# Patient Record
Sex: FEMALE | Race: ASIAN | NOT HISPANIC OR LATINO | Employment: PART TIME | URBAN - METROPOLITAN AREA
[De-identification: names, ages, dates, MRNs, and addresses within clinical notes are randomized per-mention and may not be internally consistent; named-entity substitution may affect disease eponyms.]

---

## 2017-02-02 ENCOUNTER — HOSPITAL ENCOUNTER (OUTPATIENT)
Dept: ULTRASOUND IMAGING | Facility: CLINIC | Age: 22
Discharge: HOME | End: 2017-02-02
Attending: NURSE PRACTITIONER

## 2017-02-02 ENCOUNTER — COMMUNICATION - HEALTHEAST (OUTPATIENT)
Dept: FAMILY MEDICINE | Facility: CLINIC | Age: 22
End: 2017-02-02

## 2017-02-02 ENCOUNTER — OFFICE VISIT - HEALTHEAST (OUTPATIENT)
Dept: FAMILY MEDICINE | Facility: CLINIC | Age: 22
End: 2017-02-02

## 2017-02-02 DIAGNOSIS — N39.0 UTI (URINARY TRACT INFECTION): ICD-10-CM

## 2017-02-02 DIAGNOSIS — Z34.90 PREGNANCY: ICD-10-CM

## 2017-02-02 DIAGNOSIS — Z33.1 PREGNANT STATE, INCIDENTAL: ICD-10-CM

## 2017-02-02 DIAGNOSIS — R10.812 LEFT UPPER QUADRANT ABDOMINAL TENDERNESS, REBOUND TENDERNESS PRESENCE NOT SPECIFIED: ICD-10-CM

## 2017-02-02 ASSESSMENT — MIFFLIN-ST. JEOR: SCORE: 1179.5

## 2017-02-13 ENCOUNTER — COMMUNICATION - HEALTHEAST (OUTPATIENT)
Dept: FAMILY MEDICINE | Facility: CLINIC | Age: 22
End: 2017-02-13

## 2017-03-01 ENCOUNTER — PRENATAL OFFICE VISIT - HEALTHEAST (OUTPATIENT)
Dept: FAMILY MEDICINE | Facility: CLINIC | Age: 22
End: 2017-03-01

## 2017-03-01 DIAGNOSIS — O09.891 SUPERVISION OF OTHER HIGH RISK PREGNANCIES, FIRST TRIMESTER: ICD-10-CM

## 2017-03-01 DIAGNOSIS — O34.219 PREVIOUS CESAREAN DELIVERY, ANTEPARTUM CONDITION OR COMPLICATION: ICD-10-CM

## 2017-03-01 LAB — HIV 1+2 AB+HIV1 P24 AG SERPL QL IA: NEGATIVE

## 2017-03-01 ASSESSMENT — MIFFLIN-ST. JEOR: SCORE: 1189.36

## 2017-03-02 LAB
HBV SURFACE AG SERPL QL IA: NEGATIVE
SYPHILIS RPR SCREEN - HISTORICAL: NORMAL

## 2017-03-09 ENCOUNTER — COMMUNICATION - HEALTHEAST (OUTPATIENT)
Dept: FAMILY MEDICINE | Facility: CLINIC | Age: 22
End: 2017-03-09

## 2017-03-29 ENCOUNTER — PRENATAL OFFICE VISIT - HEALTHEAST (OUTPATIENT)
Dept: FAMILY MEDICINE | Facility: CLINIC | Age: 22
End: 2017-03-29

## 2017-03-29 DIAGNOSIS — O09.891 SUPERVISION OF OTHER HIGH RISK PREGNANCIES, FIRST TRIMESTER: ICD-10-CM

## 2017-03-29 DIAGNOSIS — O34.219 PREVIOUS CESAREAN DELIVERY, ANTEPARTUM CONDITION OR COMPLICATION: ICD-10-CM

## 2017-03-29 ASSESSMENT — MIFFLIN-ST. JEOR: SCORE: 1202.51

## 2017-04-28 ENCOUNTER — PRENATAL OFFICE VISIT - HEALTHEAST (OUTPATIENT)
Dept: FAMILY MEDICINE | Facility: CLINIC | Age: 22
End: 2017-04-28

## 2017-04-28 DIAGNOSIS — O34.219 PREVIOUS CESAREAN DELIVERY, ANTEPARTUM CONDITION OR COMPLICATION: ICD-10-CM

## 2017-04-28 DIAGNOSIS — O09.892 SUPERVISION OF OTHER HIGH RISK PREGNANCIES, SECOND TRIMESTER: ICD-10-CM

## 2017-04-28 ASSESSMENT — MIFFLIN-ST. JEOR: SCORE: 1221.57

## 2017-05-16 ENCOUNTER — HOSPITAL ENCOUNTER (OUTPATIENT)
Dept: ULTRASOUND IMAGING | Facility: HOSPITAL | Age: 22
Discharge: HOME | End: 2017-05-16
Attending: FAMILY MEDICINE

## 2017-05-16 DIAGNOSIS — O09.892 SUPERVISION OF OTHER HIGH RISK PREGNANCIES, SECOND TRIMESTER: ICD-10-CM

## 2017-05-24 ENCOUNTER — PRENATAL OFFICE VISIT - HEALTHEAST (OUTPATIENT)
Dept: FAMILY MEDICINE | Facility: CLINIC | Age: 22
End: 2017-05-24

## 2017-05-24 DIAGNOSIS — O34.219 PREVIOUS CESAREAN DELIVERY, ANTEPARTUM CONDITION OR COMPLICATION: ICD-10-CM

## 2017-05-24 DIAGNOSIS — O09.892 SUPERVISION OF OTHER HIGH RISK PREGNANCIES, SECOND TRIMESTER: ICD-10-CM

## 2017-05-24 DIAGNOSIS — M54.50 ACUTE RIGHT-SIDED LOW BACK PAIN WITHOUT SCIATICA: ICD-10-CM

## 2017-05-24 ASSESSMENT — MIFFLIN-ST. JEOR: SCORE: 1238.8

## 2017-06-27 ENCOUNTER — PRENATAL OFFICE VISIT - HEALTHEAST (OUTPATIENT)
Dept: FAMILY MEDICINE | Facility: CLINIC | Age: 22
End: 2017-06-27

## 2017-06-27 DIAGNOSIS — Z34.90 PREGNANCY: ICD-10-CM

## 2017-06-27 DIAGNOSIS — O34.219 PREVIOUS CESAREAN DELIVERY, ANTEPARTUM CONDITION OR COMPLICATION: ICD-10-CM

## 2017-06-27 DIAGNOSIS — Z87.59 HISTORY OF CHORIOAMNIONITIS: ICD-10-CM

## 2017-06-28 ENCOUNTER — COMMUNICATION - HEALTHEAST (OUTPATIENT)
Dept: FAMILY MEDICINE | Facility: CLINIC | Age: 22
End: 2017-06-28

## 2017-06-28 LAB — SYPHILIS RPR SCREEN - HISTORICAL: NORMAL

## 2017-07-25 ENCOUNTER — PRENATAL OFFICE VISIT - HEALTHEAST (OUTPATIENT)
Dept: FAMILY MEDICINE | Facility: CLINIC | Age: 22
End: 2017-07-25

## 2017-07-25 DIAGNOSIS — Z34.90 PREGNANCY: ICD-10-CM

## 2017-08-01 ENCOUNTER — RECORDS - HEALTHEAST (OUTPATIENT)
Dept: ADMINISTRATIVE | Facility: OTHER | Age: 22
End: 2017-08-01

## 2017-08-22 ENCOUNTER — PRENATAL OFFICE VISIT - HEALTHEAST (OUTPATIENT)
Dept: FAMILY MEDICINE | Facility: CLINIC | Age: 22
End: 2017-08-22

## 2017-08-22 DIAGNOSIS — Z34.90 PREGNANCY: ICD-10-CM

## 2017-09-06 ENCOUNTER — PRENATAL OFFICE VISIT - HEALTHEAST (OUTPATIENT)
Dept: FAMILY MEDICINE | Facility: CLINIC | Age: 22
End: 2017-09-06

## 2017-09-06 DIAGNOSIS — O09.893 SUPERVISION OF OTHER HIGH RISK PREGNANCIES, THIRD TRIMESTER: ICD-10-CM

## 2017-09-06 DIAGNOSIS — O34.219 PREVIOUS CESAREAN DELIVERY, ANTEPARTUM CONDITION OR COMPLICATION: ICD-10-CM

## 2017-09-06 DIAGNOSIS — Z34.90 PREGNANCY: ICD-10-CM

## 2017-09-06 ASSESSMENT — MIFFLIN-ST. JEOR: SCORE: 1290.97

## 2017-09-11 ENCOUNTER — PRENATAL OFFICE VISIT - HEALTHEAST (OUTPATIENT)
Dept: FAMILY MEDICINE | Facility: CLINIC | Age: 22
End: 2017-09-11

## 2017-09-11 DIAGNOSIS — O09.893 SUPERVISION OF OTHER HIGH RISK PREGNANCIES, THIRD TRIMESTER: ICD-10-CM

## 2017-09-11 DIAGNOSIS — O34.219 PREVIOUS CESAREAN DELIVERY, ANTEPARTUM CONDITION OR COMPLICATION: ICD-10-CM

## 2017-09-11 DIAGNOSIS — Z34.90 PREGNANCY: ICD-10-CM

## 2017-09-11 ASSESSMENT — MIFFLIN-ST. JEOR: SCORE: 1291.42

## 2017-09-17 ENCOUNTER — HOME CARE/HOSPICE - HEALTHEAST (OUTPATIENT)
Dept: HOME HEALTH SERVICES | Facility: HOME HEALTH | Age: 22
End: 2017-09-17

## 2017-09-25 ENCOUNTER — COMMUNICATION - HEALTHEAST (OUTPATIENT)
Dept: HEALTH INFORMATION MANAGEMENT | Facility: CLINIC | Age: 22
End: 2017-09-25

## 2017-10-13 ENCOUNTER — COMMUNICATION - HEALTHEAST (OUTPATIENT)
Dept: FAMILY MEDICINE | Facility: CLINIC | Age: 22
End: 2017-10-13

## 2017-10-27 ENCOUNTER — OFFICE VISIT - HEALTHEAST (OUTPATIENT)
Dept: FAMILY MEDICINE | Facility: CLINIC | Age: 22
End: 2017-10-27

## 2017-10-27 DIAGNOSIS — Z30.09 GENERAL COUNSELING AND ADVICE FOR CONTRACEPTIVE MANAGEMENT: ICD-10-CM

## 2017-10-27 ASSESSMENT — MIFFLIN-ST. JEOR: SCORE: 1218.97

## 2018-01-22 ENCOUNTER — COMMUNICATION - HEALTHEAST (OUTPATIENT)
Dept: FAMILY MEDICINE | Facility: CLINIC | Age: 23
End: 2018-01-22

## 2018-01-22 ENCOUNTER — AMBULATORY - HEALTHEAST (OUTPATIENT)
Dept: FAMILY MEDICINE | Facility: CLINIC | Age: 23
End: 2018-01-22

## 2018-01-22 ENCOUNTER — OFFICE VISIT - HEALTHEAST (OUTPATIENT)
Dept: FAMILY MEDICINE | Facility: CLINIC | Age: 23
End: 2018-01-22

## 2018-01-22 DIAGNOSIS — Z30.011 ENCOUNTER FOR INITIAL PRESCRIPTION OF CONTRACEPTIVE PILLS: ICD-10-CM

## 2018-01-22 LAB
HCG UR QL: NEGATIVE
SP GR UR STRIP: 1.01 (ref 1–1.03)

## 2018-01-22 ASSESSMENT — MIFFLIN-ST. JEOR: SCORE: 1216.7

## 2018-02-04 ENCOUNTER — COMMUNICATION - HEALTHEAST (OUTPATIENT)
Dept: FAMILY MEDICINE | Facility: CLINIC | Age: 23
End: 2018-02-04

## 2018-02-12 ENCOUNTER — OFFICE VISIT - HEALTHEAST (OUTPATIENT)
Dept: FAMILY MEDICINE | Facility: CLINIC | Age: 23
End: 2018-02-12

## 2018-02-12 DIAGNOSIS — Z30.017 NEXPLANON INSERTION: ICD-10-CM

## 2018-02-12 LAB
HCG UR QL: NEGATIVE
SP GR UR STRIP: <1.005 (ref 1–1.03)

## 2018-02-12 ASSESSMENT — MIFFLIN-ST. JEOR: SCORE: 1202.18

## 2018-05-21 ENCOUNTER — OFFICE VISIT - HEALTHEAST (OUTPATIENT)
Dept: FAMILY MEDICINE | Facility: CLINIC | Age: 23
End: 2018-05-21

## 2018-05-21 DIAGNOSIS — R22.1 NECK SWELLING: ICD-10-CM

## 2018-05-21 DIAGNOSIS — R42 DIZZINESS: ICD-10-CM

## 2018-05-21 DIAGNOSIS — R11.0 NAUSEA: ICD-10-CM

## 2018-05-21 LAB
ANION GAP SERPL CALCULATED.3IONS-SCNC: 10 MMOL/L (ref 5–18)
BUN SERPL-MCNC: 12 MG/DL (ref 8–22)
CALCIUM SERPL-MCNC: 10.3 MG/DL (ref 8.5–10.5)
CHLORIDE BLD-SCNC: 107 MMOL/L (ref 98–107)
CO2 SERPL-SCNC: 23 MMOL/L (ref 22–31)
CREAT SERPL-MCNC: 0.78 MG/DL (ref 0.6–1.1)
ERYTHROCYTE [DISTWIDTH] IN BLOOD BY AUTOMATED COUNT: 10.3 % (ref 11–14.5)
GFR SERPL CREATININE-BSD FRML MDRD: >60 ML/MIN/1.73M2
GLUCOSE BLD-MCNC: 72 MG/DL (ref 70–125)
HCT VFR BLD AUTO: 43.4 % (ref 35–47)
HGB BLD-MCNC: 14.6 G/DL (ref 12–16)
MCH RBC QN AUTO: 30.8 PG (ref 27–34)
MCHC RBC AUTO-ENTMCNC: 33.6 G/DL (ref 32–36)
MCV RBC AUTO: 92 FL (ref 80–100)
PLATELET # BLD AUTO: 379 THOU/UL (ref 140–440)
PMV BLD AUTO: 6.8 FL (ref 7–10)
POTASSIUM BLD-SCNC: 4.3 MMOL/L (ref 3.5–5)
RBC # BLD AUTO: 4.74 MILL/UL (ref 3.8–5.4)
SODIUM SERPL-SCNC: 140 MMOL/L (ref 136–145)
TSH SERPL DL<=0.005 MIU/L-ACNC: 2.09 UIU/ML (ref 0.3–5)
WBC: 8.4 THOU/UL (ref 4–11)

## 2018-08-29 ENCOUNTER — OFFICE VISIT - HEALTHEAST (OUTPATIENT)
Dept: FAMILY MEDICINE | Facility: CLINIC | Age: 23
End: 2018-08-29

## 2018-08-29 DIAGNOSIS — J02.9 SORE THROAT: ICD-10-CM

## 2018-08-29 DIAGNOSIS — R11.0 NAUSEA: ICD-10-CM

## 2018-08-29 LAB — DEPRECATED S PYO AG THROAT QL EIA: NORMAL

## 2018-08-29 ASSESSMENT — MIFFLIN-ST. JEOR: SCORE: 1225.32

## 2018-08-30 LAB — GROUP A STREP BY PCR: NORMAL

## 2019-05-01 ENCOUNTER — COMMUNICATION - HEALTHEAST (OUTPATIENT)
Dept: FAMILY MEDICINE | Facility: CLINIC | Age: 24
End: 2019-05-01

## 2019-05-08 ENCOUNTER — COMMUNICATION - HEALTHEAST (OUTPATIENT)
Dept: FAMILY MEDICINE | Facility: CLINIC | Age: 24
End: 2019-05-08

## 2019-05-08 ENCOUNTER — AMBULATORY - HEALTHEAST (OUTPATIENT)
Dept: NURSING | Facility: CLINIC | Age: 24
End: 2019-05-08

## 2019-05-08 DIAGNOSIS — Z11.1 SCREENING-PULMONARY TB: ICD-10-CM

## 2019-05-10 ENCOUNTER — AMBULATORY - HEALTHEAST (OUTPATIENT)
Dept: NURSING | Facility: CLINIC | Age: 24
End: 2019-05-10

## 2019-05-10 LAB
INDURATION - HISTORICAL: 0 MM
TB SKIN TEST - HISTORICAL: NEGATIVE

## 2019-10-28 ENCOUNTER — OFFICE VISIT - HEALTHEAST (OUTPATIENT)
Dept: FAMILY MEDICINE | Facility: CLINIC | Age: 24
End: 2019-10-28

## 2019-10-28 DIAGNOSIS — Z12.4 SCREENING FOR MALIGNANT NEOPLASM OF CERVIX: ICD-10-CM

## 2019-10-28 DIAGNOSIS — Z00.00 ROUTINE GENERAL MEDICAL EXAMINATION AT A HEALTH CARE FACILITY: ICD-10-CM

## 2019-10-28 ASSESSMENT — MIFFLIN-ST. JEOR: SCORE: 1274.52

## 2019-10-30 LAB
BKR LAB AP ABNORMAL BLEEDING: NO
BKR LAB AP BIRTH CONTROL/HORMONES: NORMAL
BKR LAB AP CERVICAL APPEARANCE: NORMAL
BKR LAB AP GYN ADEQUACY: NORMAL
BKR LAB AP GYN INTERPRETATION: NORMAL
BKR LAB AP GYN OTHER FINDINGS: NORMAL
BKR LAB AP HPV REFLEX: NORMAL
BKR LAB AP LMP: NORMAL
BKR LAB AP PATIENT STATUS: NORMAL
BKR LAB AP PREVIOUS ABNORMAL: NORMAL
BKR LAB AP PREVIOUS NORMAL: NORMAL
HIGH RISK?: NO
PATH REPORT.COMMENTS IMP SPEC: NORMAL
RESULT FLAG (HE HISTORICAL CONVERSION): NORMAL

## 2019-11-04 ENCOUNTER — COMMUNICATION - HEALTHEAST (OUTPATIENT)
Dept: FAMILY MEDICINE | Facility: CLINIC | Age: 24
End: 2019-11-04

## 2019-11-04 DIAGNOSIS — Z11.1 SCREENING-PULMONARY TB: ICD-10-CM

## 2019-11-12 ENCOUNTER — AMBULATORY - HEALTHEAST (OUTPATIENT)
Dept: LAB | Facility: CLINIC | Age: 24
End: 2019-11-12

## 2019-11-12 DIAGNOSIS — Z11.1 SCREENING-PULMONARY TB: ICD-10-CM

## 2019-11-14 LAB
GAMMA INTERFERON BACKGROUND BLD IA-ACNC: 0.14 IU/ML
M TB IFN-G BLD-IMP: NEGATIVE
MITOGEN IGNF BCKGRD COR BLD-ACNC: -0.02 IU/ML
MITOGEN IGNF BCKGRD COR BLD-ACNC: 0 IU/ML
QTF INTERPRETATION: NORMAL
QTF MITOGEN - NIL: 7.84 IU/ML

## 2019-11-25 ENCOUNTER — COMMUNICATION - HEALTHEAST (OUTPATIENT)
Dept: FAMILY MEDICINE | Facility: CLINIC | Age: 24
End: 2019-11-25

## 2020-01-24 ENCOUNTER — COMMUNICATION - HEALTHEAST (OUTPATIENT)
Dept: FAMILY MEDICINE | Facility: CLINIC | Age: 25
End: 2020-01-24

## 2020-01-24 ENCOUNTER — OFFICE VISIT - HEALTHEAST (OUTPATIENT)
Dept: FAMILY MEDICINE | Facility: CLINIC | Age: 25
End: 2020-01-24

## 2020-01-24 DIAGNOSIS — B00.1 RECURRENT COLD SORES: ICD-10-CM

## 2020-02-28 ENCOUNTER — COMMUNICATION - HEALTHEAST (OUTPATIENT)
Dept: FAMILY MEDICINE | Facility: CLINIC | Age: 25
End: 2020-02-28

## 2020-04-28 ENCOUNTER — COMMUNICATION - HEALTHEAST (OUTPATIENT)
Dept: FAMILY MEDICINE | Facility: CLINIC | Age: 25
End: 2020-04-28

## 2020-04-28 DIAGNOSIS — B00.1 RECURRENT COLD SORES: ICD-10-CM

## 2020-10-22 ENCOUNTER — COMMUNICATION - HEALTHEAST (OUTPATIENT)
Dept: FAMILY MEDICINE | Facility: CLINIC | Age: 25
End: 2020-10-22

## 2020-10-22 DIAGNOSIS — Z11.1 SCREENING EXAMINATION FOR PULMONARY TUBERCULOSIS: ICD-10-CM

## 2020-10-23 ENCOUNTER — AMBULATORY - HEALTHEAST (OUTPATIENT)
Dept: NURSING | Facility: CLINIC | Age: 25
End: 2020-10-23

## 2020-10-28 ENCOUNTER — AMBULATORY - HEALTHEAST (OUTPATIENT)
Dept: LAB | Facility: CLINIC | Age: 25
End: 2020-10-28

## 2020-10-28 DIAGNOSIS — Z11.1 SCREENING EXAMINATION FOR PULMONARY TUBERCULOSIS: ICD-10-CM

## 2020-10-30 LAB
GAMMA INTERFERON BACKGROUND BLD IA-ACNC: 0.07 IU/ML
M TB IFN-G BLD-IMP: NEGATIVE
MITOGEN IGNF BCKGRD COR BLD-ACNC: 0.01 IU/ML
MITOGEN IGNF BCKGRD COR BLD-ACNC: 0.01 IU/ML
QTF INTERPRETATION: NORMAL
QTF MITOGEN - NIL: 7.72 IU/ML

## 2021-01-24 ENCOUNTER — OFFICE VISIT - HEALTHEAST (OUTPATIENT)
Dept: FAMILY MEDICINE | Facility: CLINIC | Age: 26
End: 2021-01-24

## 2021-01-24 DIAGNOSIS — R35.0 URINARY FREQUENCY: ICD-10-CM

## 2021-01-24 DIAGNOSIS — N30.00 ACUTE CYSTITIS WITHOUT HEMATURIA: ICD-10-CM

## 2021-01-24 LAB
ALBUMIN UR-MCNC: NEGATIVE MG/DL
APPEARANCE UR: CLEAR
BACTERIA #/AREA URNS HPF: ABNORMAL HPF
BILIRUB UR QL STRIP: NEGATIVE
COLOR UR AUTO: YELLOW
GLUCOSE UR STRIP-MCNC: NEGATIVE MG/DL
HGB UR QL STRIP: ABNORMAL
KETONES UR STRIP-MCNC: NEGATIVE MG/DL
LEUKOCYTE ESTERASE UR QL STRIP: ABNORMAL
NITRATE UR QL: NEGATIVE
PH UR STRIP: 6.5 [PH] (ref 5–8)
RBC #/AREA URNS AUTO: ABNORMAL HPF
SP GR UR STRIP: 1.01 (ref 1–1.03)
SQUAMOUS #/AREA URNS AUTO: ABNORMAL LPF
UROBILINOGEN UR STRIP-ACNC: ABNORMAL
WBC #/AREA URNS AUTO: ABNORMAL HPF

## 2021-01-25 LAB — BACTERIA SPEC CULT: NO GROWTH

## 2021-02-10 ENCOUNTER — OFFICE VISIT - HEALTHEAST (OUTPATIENT)
Dept: FAMILY MEDICINE | Facility: CLINIC | Age: 26
End: 2021-02-10

## 2021-02-10 DIAGNOSIS — Z30.46 NEXPLANON REMOVAL: ICD-10-CM

## 2021-02-10 ASSESSMENT — MIFFLIN-ST. JEOR: SCORE: 1246.84

## 2021-04-07 ENCOUNTER — COMMUNICATION - HEALTHEAST (OUTPATIENT)
Dept: FAMILY MEDICINE | Facility: CLINIC | Age: 26
End: 2021-04-07

## 2021-04-22 ENCOUNTER — COMMUNICATION - HEALTHEAST (OUTPATIENT)
Dept: FAMILY MEDICINE | Facility: CLINIC | Age: 26
End: 2021-04-22

## 2021-04-27 ENCOUNTER — OFFICE VISIT - HEALTHEAST (OUTPATIENT)
Dept: FAMILY MEDICINE | Facility: CLINIC | Age: 26
End: 2021-04-27

## 2021-04-27 DIAGNOSIS — Z3A.01 LESS THAN 8 WEEKS GESTATION OF PREGNANCY: ICD-10-CM

## 2021-04-27 DIAGNOSIS — Z36.87 UNSURE OF LMP (LAST MENSTRUAL PERIOD) AS REASON FOR ULTRASOUND SCAN: ICD-10-CM

## 2021-04-27 LAB — HCG UR QL: POSITIVE

## 2021-04-27 ASSESSMENT — MIFFLIN-ST. JEOR: SCORE: 1249.57

## 2021-05-07 ENCOUNTER — COMMUNICATION - HEALTHEAST (OUTPATIENT)
Dept: FAMILY MEDICINE | Facility: CLINIC | Age: 26
End: 2021-05-07

## 2021-05-07 ENCOUNTER — HOSPITAL ENCOUNTER (OUTPATIENT)
Dept: ULTRASOUND IMAGING | Facility: HOSPITAL | Age: 26
Discharge: HOME | End: 2021-05-07
Attending: FAMILY MEDICINE

## 2021-05-10 ENCOUNTER — COMMUNICATION - HEALTHEAST (OUTPATIENT)
Dept: FAMILY MEDICINE | Facility: CLINIC | Age: 26
End: 2021-05-10

## 2021-05-20 ENCOUNTER — PRENATAL OFFICE VISIT - HEALTHEAST (OUTPATIENT)
Dept: FAMILY MEDICINE | Facility: CLINIC | Age: 26
End: 2021-05-20

## 2021-05-20 ENCOUNTER — RECORDS - HEALTHEAST (OUTPATIENT)
Dept: ADMINISTRATIVE | Facility: OTHER | Age: 26
End: 2021-05-20

## 2021-05-20 DIAGNOSIS — O34.219 PREVIOUS CESAREAN DELIVERY, ANTEPARTUM CONDITION OR COMPLICATION: ICD-10-CM

## 2021-05-20 DIAGNOSIS — O09.891 SUPERVISION OF OTHER HIGH RISK PREGNANCIES, FIRST TRIMESTER: ICD-10-CM

## 2021-05-20 LAB
ALBUMIN UR-MCNC: ABNORMAL G/DL
APPEARANCE UR: CLEAR
BASOPHILS # BLD AUTO: 0.1 THOU/UL (ref 0–0.2)
BASOPHILS NFR BLD AUTO: 1 % (ref 0–2)
BILIRUB UR QL STRIP: NEGATIVE
COLOR UR AUTO: YELLOW
EOSINOPHIL # BLD AUTO: 0.1 THOU/UL (ref 0–0.4)
EOSINOPHIL NFR BLD AUTO: 1 % (ref 0–6)
ERYTHROCYTE [DISTWIDTH] IN BLOOD BY AUTOMATED COUNT: 12.1 % (ref 11–14.5)
GLUCOSE UR STRIP-MCNC: NEGATIVE MG/DL
HCT VFR BLD AUTO: 38.3 % (ref 35–47)
HGB BLD-MCNC: 13.1 G/DL (ref 12–16)
HGB UR QL STRIP: NEGATIVE
HIV 1+2 AB+HIV1 P24 AG SERPL QL IA: NEGATIVE
IMM GRANULOCYTES # BLD: 0 THOU/UL
IMM GRANULOCYTES NFR BLD: 0 %
KETONES UR STRIP-MCNC: NEGATIVE MG/DL
LEUKOCYTE ESTERASE UR QL STRIP: ABNORMAL
LYMPHOCYTES # BLD AUTO: 1.7 THOU/UL (ref 0.8–4.4)
LYMPHOCYTES NFR BLD AUTO: 16 % (ref 20–40)
MCH RBC QN AUTO: 30.9 PG (ref 27–34)
MCHC RBC AUTO-ENTMCNC: 34.2 G/DL (ref 32–36)
MCV RBC AUTO: 90 FL (ref 80–100)
MONOCYTES # BLD AUTO: 0.5 THOU/UL (ref 0–0.9)
MONOCYTES NFR BLD AUTO: 5 % (ref 2–10)
NEUTROPHILS # BLD AUTO: 8 THOU/UL (ref 2–7.7)
NEUTROPHILS NFR BLD AUTO: 77 % (ref 50–70)
NITRATE UR QL: NEGATIVE
PH UR STRIP: 7 [PH] (ref 5–8)
PLATELET # BLD AUTO: 348 THOU/UL (ref 140–440)
PMV BLD AUTO: 10.6 FL (ref 8.5–12.5)
RBC # BLD AUTO: 4.24 MILL/UL (ref 3.8–5.4)
SP GR UR STRIP: 1.02 (ref 1–1.03)
UROBILINOGEN UR STRIP-ACNC: ABNORMAL
WBC: 10.4 THOU/UL (ref 4–11)

## 2021-05-20 RX ORDER — SWAB
1 SWAB, NON-MEDICATED MISCELLANEOUS DAILY
Status: SHIPPED | COMMUNITY
Start: 2021-05-20

## 2021-05-20 ASSESSMENT — MIFFLIN-ST. JEOR: SCORE: 1225.07

## 2021-05-21 LAB
ABO/RH(D): NORMAL
ABORH REPEAT: NORMAL
ANTIBODY SCREEN: NEGATIVE
BACTERIA SPEC CULT: NORMAL
C TRACH DNA SPEC QL PROBE+SIG AMP: NEGATIVE
HBV SURFACE AG SERPL QL IA: NEGATIVE
N GONORRHOEA DNA SPEC QL NAA+PROBE: NEGATIVE
RUBV IGG SERPL QL IA: POSITIVE
T PALLIDUM AB SER QL: NEGATIVE

## 2021-05-27 VITALS — DIASTOLIC BLOOD PRESSURE: 68 MMHG | HEART RATE: 108 BPM | SYSTOLIC BLOOD PRESSURE: 108 MMHG

## 2021-05-27 VITALS — BODY MASS INDEX: 26.09 KG/M2 | HEIGHT: 59 IN | WEIGHT: 129.6 LBS

## 2021-05-30 VITALS — BODY MASS INDEX: 23.95 KG/M2 | WEIGHT: 118.8 LBS | HEIGHT: 59 IN

## 2021-05-30 VITALS — HEIGHT: 59 IN | WEIGHT: 127.2 LBS | BODY MASS INDEX: 25.64 KG/M2

## 2021-05-30 VITALS — WEIGHT: 120.1 LBS | HEIGHT: 59 IN | BODY MASS INDEX: 24.21 KG/M2

## 2021-05-30 VITALS — BODY MASS INDEX: 24.8 KG/M2 | WEIGHT: 123 LBS | HEIGHT: 59 IN

## 2021-05-31 VITALS — WEIGHT: 142.6 LBS | HEIGHT: 59 IN | BODY MASS INDEX: 28.75 KG/M2

## 2021-05-31 VITALS — BODY MASS INDEX: 27.1 KG/M2 | WEIGHT: 135.31 LBS

## 2021-05-31 VITALS — WEIGHT: 142.5 LBS | HEIGHT: 59 IN | BODY MASS INDEX: 28.73 KG/M2

## 2021-05-31 VITALS — BODY MASS INDEX: 28.24 KG/M2 | WEIGHT: 141 LBS

## 2021-05-31 VITALS — WEIGHT: 131 LBS | BODY MASS INDEX: 26.41 KG/M2 | HEIGHT: 59 IN

## 2021-05-31 VITALS — BODY MASS INDEX: 25.7 KG/M2 | HEIGHT: 59 IN | WEIGHT: 127.5 LBS

## 2021-05-31 VITALS — WEIGHT: 127 LBS | HEIGHT: 59 IN | BODY MASS INDEX: 25.6 KG/M2

## 2021-05-31 VITALS — WEIGHT: 138 LBS | BODY MASS INDEX: 27.64 KG/M2

## 2021-06-01 VITALS — HEIGHT: 59 IN | WEIGHT: 123.8 LBS | BODY MASS INDEX: 24.96 KG/M2

## 2021-06-01 VITALS — HEIGHT: 59 IN | BODY MASS INDEX: 25.99 KG/M2 | WEIGHT: 128.9 LBS

## 2021-06-01 VITALS — BODY MASS INDEX: 25.25 KG/M2 | WEIGHT: 125 LBS

## 2021-06-02 NOTE — PROGRESS NOTES
Assessment/Plan:     1. Routine general medical examination at a health care facility  Routine history and physical, updated in EMR.  Immunizations are updated today.  Fasting labs deferred for a future visit.  Pap smear updated today.  Plan repeat physical in 1 year.    2. Screening for malignant neoplasm of cervix  Routine screening pap smear updated today.  - Gynecologic Cytology (PAP Smear)        Subjective:      Ladonna Casiano is a 23 y.o. female who presents for an annual exam. The patient is sexually active. The patient participates in regular exercise: yes. The patient reports that there is not domestic violence in her life.  Patient reports she has been feeling well and has no specific questions or concerns today.  She is still breast-feeding occasionally, has a 2-year-old daughter.  She has noticed a bit of weight gain and continued hair loss, but had her thyroid checked last year and this was normal.  She has no new concerns in this regard and does not wish to have this rechecked again.  She believes her weight gain is from lack of exercise.  She requires a form to be filled out today as she recently was accepted into nursing school.    Healthy Habits:   Regular Exercise: Yes  Sunscreen Use: Yes  Healthy Diet: Yes  Dental Visits Regularly: No  Seat Belt: Yes  Sexually active: Yes  Self Breast Exam Monthly:No  Lipid Profile: No  Glucose Screen: No      Immunization History   Administered Date(s) Administered     DTaP, historic 02/07/1994, 04/14/1994, 06/14/1994, 11/12/1996, 03/29/1999     HPV Quadrivalent 08/18/2009, 11/17/2009     Hep B, historic 1993, 02/07/1994, 06/14/1994     HiB, historic,unspecified 02/07/1994, 04/14/1994, 06/14/1994, 11/12/1996     IPV 02/07/1994, 04/14/1994, 06/14/1994, 03/29/1999     Influenza,seasonal quad, PF, =/> 6months 09/24/2015, 11/09/2016     MMR 11/12/1996, 03/29/1999     Meningococcal MCV4 Conjugate,Unspecified 08/18/2009     PPD Test 05/08/2019     Tdap 08/30/2006,  2016, 2017     Varicella 2009, 2009     Immunization status: up to date and documented.    No exam data present    Gynecologic History  Patient's last menstrual period was 10/18/2019.  Contraception: Norplant  Last Pap: 10/13/15. Results were: normal      OB History    Para Term  AB Living   2 2 2 0 0 2   SAB TAB Ectopic Multiple Live Births   0 0 0 0 2      # Outcome Date GA Lbr Israel/2nd Weight Sex Delivery Anes PTL Lv   2 Term 17 38w1d 14:05 / 00:11 6 lb 12 oz (3.062 kg) F  None N LULY   1 Term 16 40w4d  6 lb 9.5 oz (2.99 kg) M CS-LTranv EPI N LULY      Complications: Failure to Progress in Second Stage, Chorioamnionitis       Current Outpatient Medications   Medication Sig Dispense Refill     prenatal vitamin iron-folic acid 27mg-0.8mg (PRENATAL S) 27 mg iron- 800 mcg Tab tablet Take 1 tablet by mouth daily.       No current facility-administered medications for this visit.      Past Medical History:   Diagnosis Date     Oligohydramnios, third trimester, fetus 1 3/28/2016     Previous  delivery, antepartum condition or complication 3/29/2017    Delivered 16     , delivered, current hospitalization      Past Surgical History:   Procedure Laterality Date      SECTION, LOW TRANSVERSE N/A 2016     Patient has no known allergies.  Family History   Problem Relation Age of Onset     No Medical Problems Mother      No Medical Problems Father      Diabetes Paternal Grandfather      Breast cancer Neg Hx      Cancer Neg Hx      Colon cancer Neg Hx      Heart disease Neg Hx      Social History     Socioeconomic History     Marital status: Single     Spouse name: Not on file     Number of children: Not on file     Years of education: Not on file     Highest education level: Not on file   Occupational History     Occupation: Radiology Specialist     Employer: [employer]   Social Needs     Financial resource strain: Not on file      "Food insecurity:     Worry: Not on file     Inability: Not on file     Transportation needs:     Medical: Not on file     Non-medical: Not on file   Tobacco Use     Smoking status: Never Smoker     Smokeless tobacco: Former User   Substance and Sexual Activity     Alcohol use: No     Drug use: No     Sexual activity: Yes     Partners: Male     Comment:  Ganesh   Lifestyle     Physical activity:     Days per week: Not on file     Minutes per session: Not on file     Stress: Not on file   Relationships     Social connections:     Talks on phone: Not on file     Gets together: Not on file     Attends Anglican service: Not on file     Active member of club or organization: Not on file     Attends meetings of clubs or organizations: Not on file     Relationship status: Not on file     Intimate partner violence:     Fear of current or ex partner: Not on file     Emotionally abused: Not on file     Physically abused: Not on file     Forced sexual activity: Not on file   Other Topics Concern     Not on file   Social History Narrative     Not on file       Review of Systems  General:  weight gain  Eyes: Denies problem  Ears/Nose/Throat: Denies problem  Cardiovascular: Denies problem  Respiratory:  Denies problem  Gastrointestinal:  Denies problem  Genitourinary: Denies problem  Musculoskeletal:  Denies problem  Skin: some hair loss, not patchy  Neurologic: Denies problem  Psychiatric: Denies problem  Endocrine: Denies problem  Heme/Lymphatic: Denies problem   Allergic/Immunologic: Denies problem        Objective:         Vitals:    10/28/19 1430   BP: 100/62   Pulse: 96   Resp: 20   Weight: 139 lb 6.4 oz (63.2 kg)   Height: 4' 11.1\" (1.501 m)     Body mass index is 28.06 kg/m .    Physical Exam:  General Appearance: Alert, cooperative, no distress, appears stated age  Head: Normocephalic, without obvious abnormality, atraumatic  Eyes: PERRL, conjunctiva/corneas clear, EOM's intact  Ears: Normal TM's and external ear " canals, both ears  Nose: Nares normal, septum midline, mucosa normal, no drainage  Throat: Lips, mucosa, and tongue normal; teeth and gums normal  Neck: Supple, symmetrical, trachea midline, no adenopathy; thyroid: not enlarged, symmetric, no tenderness/mass/nodules  Back: Symmetric, no curvature, ROM normal, no CVA tenderness  Lungs: Clear to auscultation bilaterally, respirations unlabored  Breasts: No breast masses, tenderness, asymmetry, or nipple discharge  Heart: Regular rate and rhythm, S1 and S2 normal, no murmur, rub, or gallop  Abdomen: Soft, non-tender, bowel sounds active all four quadrants, no masses, no organomegaly  Pelvic: Normally developed genitalia with no external lesions or eruptions. Vagina and cervix show no lesions, inflammation, discharge or tenderness. No cystocele, No rectocele. Uterus normal to palpation.  No adnexal mass or tenderness.  Extremities: Extremities normal, atraumatic, no cyanosis or edema  Skin: Skin color, texture, turgor normal, no rashes or lesions  Lymph nodes: Cervical, supraclavicular, and axillary nodes normal  Neurologic: Normal   Psychiatric: Normal mood and affect

## 2021-06-03 VITALS
SYSTOLIC BLOOD PRESSURE: 100 MMHG | WEIGHT: 139.4 LBS | RESPIRATION RATE: 20 BRPM | HEART RATE: 96 BPM | BODY MASS INDEX: 28.1 KG/M2 | HEIGHT: 59 IN | DIASTOLIC BLOOD PRESSURE: 62 MMHG

## 2021-06-05 NOTE — PROGRESS NOTES
Provider E-Visit time total (minutes): 6      Assessment:   The encounter diagnosis was Recurrent cold sores.     Plan:     Medications Ordered   Medications     valACYclovir (VALTREX) 1000 MG tablet     Sig: Take 2 tablets (2,000 mg total) by mouth 2 (two) times a day for 1 day.     Dispense:  4 tablet     Refill:  3     Patient Instructions     Based on the information that you have provided, I have placed an order for you to start treatment.  View your full visit summary for details. Click on the link below to access your visit summary.    Your pharmacist will address any questions you may have about taking the medication.    Return for further follow up if needed. Call [phone]) or schedule an appointment via [facility]    Subjective:   Ladonna Casiano is a 24 y.o. female who submitted an eVisit request for evaluation of her No chief complaint on file..  See the questionnaire and message section of encounter report for information related to history of present illness and review of systems.    The following portions of the patient's history were reviewed and updated as appropriate:  She does not have any pertinent problems on file.  She has No Known Allergies..     Objective:   No exam performed today, patient submitted as eVisit.

## 2021-06-08 NOTE — PROGRESS NOTES
Assessment/Plan:       1. Left upper quadrant abdominal tenderness, rebound tenderness presence not specified  Etiology of the left upper quadrant pain is not clear at this time.  However patient did have left lower quadrant pain and incidentally was found to be pregnant as well as have a UTI.  She'll be treated for the UTI and was notified of her pregnancy.  I will obtain a pelvic ultrasound to assess the fetus and date the pregnancy.  I am concerned that this could be a possible ectopic pregnancy given the pain. She will have the ultrasound at 3:30pm at [facility].  Patient will be notified of any abnormalities in her lab values and results from the ultrasound.  Patient to follow-up as needed.  - Comprehensive Metabolic Panel  - Lipase  - HM2(CBC w/o Differential)  - US Abdomen Limited; Future  - Pregnancy (Beta-hCG, Qual), Urine  - Urinalysis-UC if Indicated  - Culture, Urine    2. Pregnancy  Positive pregnancy test in the clinic today.  Ultrasound be completed to assess viability and dating.  - US Pelvis With Transvaginal Non OB; Future    3. UTI (urinary tract infection)  Urinalysis was positive for an acute UTI.  Patient will be treated with Macrobid 1 tab twice daily for 7 days.  Discussed risks versus benefits of this antibiotic as well as potential side effects.  She was advised to follow-up with the pain does not improve.  - nitrofurantoin, macrocrystal-monohydrate, (MACROBID) 100 MG capsule; Take 1 capsule (100 mg total) by mouth 2 (two) times a day for 7 days.  Dispense: 14 capsule; Refill: 0        Subjective:      Ladonna Casiano is a 21 y.o. female who presents for left upper quadrant pain that has been present for 5 days persistently. She has found that if she lays on her left she will have improvement of the pain. She is not sure of any specific aggravating factors to the pain. She denies alcohol intake, changes in diet, constipation, or vomiting.   She does feel nauseous and has had one episode of  "diarrhea. She is still breast feeding.  Her son is 9 months old and she has not had her period yet.  She denies any other sick contacts. Her appetite has been normal. The pain is sharp in nature.    The following portions of the patient's history were reviewed and updated as appropriate: allergies, current medications and problem list.    Review of Systems   Pertinent items are noted in HPI.      Objective:        Visit Vitals     /60 (Patient Site: Left Arm, Patient Position: Sitting, Cuff Size: Adult Regular)     Pulse 88     Resp 16     Ht 4' 11\" (1.499 m)     Wt 118 lb 12.8 oz (53.9 kg)     BMI 23.99 kg/m2       General appearance: alert, appears stated age and cooperative  Head: Normocephalic, without obvious abnormality, atraumatic  Lungs: clear to auscultation bilaterally  Heart: regular rate and rhythm, S1, S2 normal, no murmur, click, rub or gallop  Abdomen: normal findings: bowel sounds normal, no masses palpable and symmetric and abnormal findings:  moderate tenderness in the LUQ and in the LLQ  Neurologic: Grossly normal     Results for orders placed or performed in visit on 02/02/17   HM2(CBC w/o Differential)   Result Value Ref Range    WBC 14.4 (H) 4.0 - 11.0 thou/uL    RBC 4.43 3.80 - 5.40 mill/uL    Hemoglobin 13.2 12.0 - 16.0 g/dL    Hematocrit 39.1 35.0 - 47.0 %    MCV 88 80 - 100 fL    MCH 29.9 27.0 - 34.0 pg    MCHC 33.9 32.0 - 36.0 g/dL    RDW 11.0 11.0 - 14.5 %    Platelets 366 140 - 440 thou/uL    MPV 7.2 7.0 - 10.0 fL   Pregnancy (Beta-hCG, Qual), Urine   Result Value Ref Range    Pregnancy Test, Urine Positive (!) Negative   Urinalysis-UC if Indicated   Result Value Ref Range    Color, UA Yellow Colorless, Yellow, Straw, Light Yellow    Clarity, UA Clear Clear    Glucose, UA Negative Negative    Bilirubin, UA Negative Negative    Ketones, UA Negative Negative    Specific Gravity, UA 1.015 1.002 - 1.030    Blood, UA Moderate (!) Negative    pH, UA 6.0 4.5 - 8.0    Protein, UA 30 mg/dL " (!) Negative mg/dL    Urobilinogen, UA 0.2 E.U./dL 0.2 E.U./dL, 1.0 E.U./dL    Nitrite, UA Positive (!) Negative    Leukocytes, UA Large (!) Negative

## 2021-06-09 NOTE — PROGRESS NOTES
PRENATAL VISIT   FIRST OBSTETRICAL EXAM - OB    Assessment / Plan     1. Supervision of other high risk pregnancies, first trimester  Normal first prenatal visit at 9w5d.  Discussed orientation, general information, lifestyle, nutrition, exercise, warning signs, resources, lab testing and risk screening with patient.  Questions answered.  Initial labs drawn.  Prenatal vitamins.  Problem list reviewed and updated.  Genetic screening test options discussed:  Patient elects to decline all testing  Role of ultrasound in pregnancy discussed; fetal survey: requested.  Follow up: Return in 4 weeks (on 3/29/2017).  - prenatal vitamin iron-folic acid 27mg-0.8mg (PRENATAL S) 27 mg iron- 800 mcg Tab tablet; Take 1 tablet by mouth daily.  - ABO/RH Typing (OP order)  - Hepatitis B Surface antigen (HBsAG)  - HIV Antigen/Antibody Screening Cascade  - HM1(CBC and Differential)  - HML Antibody Screen  - RPR  - Rubella Immune Status (IgG)  - Urinalysis Macroscopic  - Culture, Urine  - Chlamydia/gonorrhoeae CERVIX  - HM1 (CBC with Diff)    2. Previous  delivery, antepartum condition or complication  Patient is interested in trial of labor after  section, but close spacing of pregnancies may limit this.  She will pursue a consult at around 30 weeks with OB/GYN to discuss further.       Subjective:    Ladonna Casiano is a 21 y.o.  here today for her First Obstetrical Exam.     She reports more nausea with this pregnancy than with her son.  She has been eating frequent small meals and this seems to help.  She has been tolerating a prenatal vitamin.  She felt quite crampy yesterday, but had no leakage of fluid or bleeding.    OB History    Para Term  AB SAB TAB Ectopic Multiple Living   2 1 1 0 0 0 0 0 0 1      # Outcome Date GA Lbr Israel/2nd Weight Sex Delivery Anes PTL Lv   2 Current            1 Term 16 40w4d  6 lb 9.5 oz (2.99 kg) M CS-LTranv EPI N Y      Complications: Failure to Progress in Second  "Stage,Chorioamnionitis          Expected Date of Delivery: 2017, by Ultrasound    Past Medical History:   Diagnosis Date     Oligohydramnios, third trimester, fetus 1 3/28/2016     Past Surgical History:   Procedure Laterality Date      SECTION, CLASSIC  2016     Social History   Substance Use Topics     Smoking status: Never Smoker     Smokeless tobacco: Former User     Alcohol use No     Current Outpatient Prescriptions   Medication Sig Dispense Refill     prenatal vitamin iron-folic acid 27mg-0.8mg (PRENATAL S) 27 mg iron- 800 mcg Tab tablet Take 1 tablet by mouth daily.       No current facility-administered medications for this visit.      No Known Allergies          High Risk Behavior: Last birth within 1 year    Review of Systems  General:  Denies problem  Eyes: Denies problem  Ears/Nose/Throat: Denies problem  Cardiovascular: Denies problem  Respiratory:  Denies problem  Gastrointestinal: Nausea, no vomiting  Genitourinary: Denies problem  Musculoskeletal:  Denies problem  Skin: Denies problem  Neurologic: Denies problem  Psychiatric: Denies problem  Endocrine: Denies problem  Heme/Lymphatic: Denies problem   Allergic/Immunologic: Denies problem       Objective:   Objective    Vitals:    17 1027   BP: 100/62   Pulse: 88   Resp: 16   Weight: 120 lb 1.6 oz (54.5 kg)   Height: 4' 11.25\" (1.505 m)     Physical Exam:  General Appearance: Alert, cooperative, no distress, appears stated age  Head: Normocephalic, without obvious abnormality, atraumatic  Eyes: PERRL, conjunctiva/corneas clear, EOM's intact  Ears: Normal TM's and external ear canals, both ears  Nose: Nares normal, septum midline,mucosa normal, no drainage  Throat: Lips, mucosa, and tongue normal; teeth and gums normal  Neck: Supple, symmetrical, trachea midline, no adenopathy; thyroid: not enlarged, symmetric, no tenderness/mass/nodules  Back: Symmetric, no curvature, ROM normal, no CVA tenderness  Lungs: Clear to auscultation " bilaterally, respirations unlabored  Breasts: No breast masses, tenderness, asymmetry, or nipple discharge  Heart: Regular rate and rhythm, S1 and S2 normal, no murmur, rub, or gallop  Abdomen: Soft, non-tender, bowel sounds active all four quadrants, no masses, no organomegaly  Pelvic:Not examined  Extremities: Extremities normal, atraumatic, no cyanosis or edema  Skin: Skin color, texture, turgor normal, no rashes or lesions  Lymph nodes: Cervical, supraclavicular, and axillary nodes normal  Neurologic: Normal

## 2021-06-09 NOTE — PROGRESS NOTES
"Continues to feel nauseated despite trying vitamin B6 \"for 1 day\".  Not a lot of vomiting, but continuous nausea.  She did not have this with her previous pregnancy.  No leakage of fluid or bleeding, otherwise feeling well.  Not feeling baby move yet.  Discussed trying doxylamine along with B6, particularly in the evening or weekends as this can cause some sedation.  If no benefit from this or oversedation, she will let me know and could consider Zofran as the next step.  Plan follow-up in 4 weeks and will give order for fetal survey ultrasound at the next visit.  "

## 2021-06-10 NOTE — PROGRESS NOTES
Feeling better, never really vomited but nausea is improving.  No leakage of fluid or bleeding.  Starting to feel baby move intermittently.  Again reviewed my upcoming maternity leave, patient is undecided whether she wishes to transfer to Dr. Rivera or stay here and work with the midwives during my absence.  Asked her to decide by her next visit.  Order for fetal survey ultrasound given today.  Plan follow-up in 4 weeks.

## 2021-06-10 NOTE — PROGRESS NOTES
Having some lower back pain, can radiate to the right buttock.  Can happen when she is up walking, can happen when sitting as well.  Sounds like right-sided SI joint discomfort.  Discussed massage or heat over the area, discussed osteopathic manipulation if desired here at the [facility].  She can take Tylenol if pain is severe.  No leakage of fluid or bleeding, baby is moving well.  Otherwise feeling well.  Discussed her fetal survey ultrasound, which looked great.  Discussed my upcoming maternity leave, patient will transfer to Dr. Rivera at the [facility] during my absence.  She was encouraged to make her next appointment there in 4 weeks and plan 1 hour GCT at that visit.

## 2021-06-11 NOTE — PROGRESS NOTES
Subjective: Ladonna presents to the clinic today with her , Ganesh, and son, Alfredo, for a routine OB visit. She was previously seen by Dr. Felicia Umana and she plans to have Dr. Umana deliver her baby when she returns from her [place]. Dr. Umana sees Alfredo and she will be seeing this baby as well. She will be having a baby girl; baby is moving around a lot for her right now. She is doing her 1 hour glucose challenge today. Her labor stalled out with Alfredo; she developed a fever with chorioamnionitis eventually. She dilated completely but Alfredo became stuck after she pushed for 1.5 hours; Alfredo also became tachycardic so she went to . She would like to have a vaginal delivery this time if possible. She plans to have a consultation with Dr. Garza around 30 weeks gestation. Alfredo was in the NICU for a while for observation but he has been doing well. She is showing more with this baby; she had ultrasounds frequently with Alfredo and his growth was fine, but she was small. She has nausea still intermittently, especially if she does not eat or if she eats too much. She does not think she needs anything for nausea; she just eats something when she feels nauseous. 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     Back Pain: Her back pain has been intermittent. She has been sleeping on her back and so she is trying to sleep on her sides more; her pain has been improving the more she sleeps on her sides. She is feeling much bigger with this pregnancy.     Objective: See above.    Assessment & Plan:   2 Para 1 pregnancy -high-risk pregnancy for history of  wanting to attempt trial of labor with vaginal delivery.  Will place referral for patient to see Dr. Garza for consultation around 30 weeks or so.  The concern is that previous delivery has been in the last 15 months and is very close to this pregnancy that there is concern  for rupture.  1 hour glucose testing today - passed. we will continue to follow patient until Dr. Umana is back from her medical leave  -Recommended Tdap at next office appointment    The visit lasted a total of 12 minutes face to face with the patient. Over 50% of the time was spent counseling and educating the patient about her OB history and prenatal care.    I, Lillie Gilliland, am scribing for and in the presence of Dr. Rivera.  I, Dr. Anabella Rivera DO, personally performed the services described in this documentation as scribed by Lillie Gilliland in my presence, and it is both accurate and complete.

## 2021-06-12 NOTE — PROGRESS NOTES
Assessment & Plan:   2 Para 1 pregnancy -routine OB visit.  Patient may be doing well.  No longer having nausea today.  Will obtain a urinalysis however.  Patient is going to register for the hospital.  No  labor warning signs or symptoms.  Tdap given today.  She is going to follow-up with me now in 4 weeks and then moving forward from there she will be following up with Dr. Umana until delivery.  She does have an upcoming appointment in 1 week with Dr. Garza for clearance of trial of labor after         Subjective: Ladonna presents to the clinic today with her  and son for a routine OB visit. She has been feeling good baby movement; she spends a lot of time on her right side. She has an appointment with Dr. Garza scheduled for 1 week from today. Her  scar started off looking good but then it started sticking out. She is wondering when she should schedule for the hospital. She would like her Tdap today. She has an appointment scheduled here in 4 weeks and then she will schedule her 36 week visit with Dr. Umana. She is feeling a bit nauseous today. She took a vitamin B6 pill today and it was not very helpful; she has not tried Unisom. Her nausea started yesterday and she also had a headache; it went away and came back today. She can feel baby moving and her upper abdomen is tight occasionally. Her feet are a bit swollen; it has improved with sitting and elevating her legs. She denies any fever, chills, heartburn,      Objective: See above.     The visit lasted a total of 9 minutes face to face with the patient. Over 50% of the time was spent counseling and educating the patient.    I, Lillie Gilliland, am scribing for and in the presence of Dr. Rivera.  I, Dr. Anabella Rivera DO , personally performed the services described in this documentation as scribed by Lillie Gilliland in my presence, and it is both accurate and complete.

## 2021-06-12 NOTE — TELEPHONE ENCOUNTER
New Appointment Needed  What is the reason for the visit:    Mantoux Placement  Appt Request  What is the purpose of the mantoux?:  School   Is there a form to be completed?:   No  How soon do you need the mantoux placed?:  As able need to be done prior to end of November.  Patient states school informed they either the blood test or mantoux 2 step is okay.  Patient looking for blood test options for a Wednesday.      Provider Preference: [facility]  How soon do you need to be seen?: as able   Waitlist offered?: No  Okay to leave a detailed message:  Yes

## 2021-06-12 NOTE — PROGRESS NOTES
Assessment & Plan:  2 Para 1 pregnancy -routine OB visit.  Doing well.  Reviewed labor preferences today.  PIH.  No  labor symptoms or PIH symptoms.  She has an office appointment with Dr. Umana starting at 36 weeks and recommended that she see her weekly until delivery.  Dr. Umana may want to consider getting an ultrasound around that time to evaluate for oligohydramnios which was a complication with her first pregnancy.  She did see Dr. Garza and did get clearance for trial of labor after  but I do not see that report in the chart nor the consent.  We will try to obtain from her office so that it is in the chart for Dr. Umana.    Subjective: Ladonna presents to the clinic today for a routine OB visit. She was seen by Dr. Garza, and she was told she could attempt a . She has all her appointments scheduled with Dr. Umana in the future. Dr. Umana will be baby's doctor. She plans to breast feed and she has a breast pump. She plans to deliver at [facility]; she would like to try labor without an epidural, but she is not opposed. IV pain medications were not effective for her last time which is why she ended up getting an epidural. She is interested in nitrous oxide. Her  will be her only  in the delivery room; he will want to cut the cord. She is not sure if would like a Nexplanon or IUD after this baby; she was not good at taking the oral contraception regularly. She will be due for a cervical exam and GBS testing at her next visit. She has had abdominal pain a couple days, but it usually happens when she is too active or holding her son. She has not been having nausea very much for the past couple weeks, but she is surprised she still has nausea occasionally. She has had [place] contractions occasionally; she is trying to stay well hydrated. She did not have any gestation hypertension. She was leaking some amniotic fluid closer to the end of her  pregnancy; she stayed at [facility] overnight for observation, and her amniotic fluid came back up. She does not have any special requests. Her back pain has improved. She has thick, mucousy vaginal discharge occasionally; she denies any vaginal itching. She does not think it was her mucus plug. She denies any swelling of the hands or feet, headaches, dysuria, urinary frequency, visual changes, or regular contractions.     Objective: See above.  Fundal Height: 34.5 cm    The visit lasted a total of 12 minutes face to face with the patient. Over 50% of the time was spent counseling and educating the patient about routine prenatal care.    I, Lillie Gilliland, am scribing for and in the presence of Dr. Byrd.  I, Dr. Anabella byrd DO , personally performed the services described in this documentation as scribed by Lillie Gilliland in my presence, and it is both accurate and complete.

## 2021-06-12 NOTE — PROGRESS NOTES
08/23/17 at 1:04 pm - Received notes and consent. Dr. Rivera, did you need to review this or does it just need to be sent to Medical Records to be scan into pt's chart?

## 2021-06-12 NOTE — PROGRESS NOTES
Patient continues to feel well, lots of fetal movement.  Had some pelvic pressure over the weekend, no burning with urination, no leakage of fluid or bleeding.  No lower extremity edema.  Reviewed who and when to call if she feels she is in labor.  Plan follow-up in 1 week if remains undelivered.

## 2021-06-12 NOTE — TELEPHONE ENCOUNTER
Called and lvm for patient to call back to schedule a lab only appt.    Please assist in scheduling

## 2021-06-12 NOTE — PROGRESS NOTES
Feeling well, no LOF/bleeding, good fetal movement.  No LE edema.  Had some fleeting LLQ pain, resolved now.  Discussed watching for more severe or consistent pain, LOF/bleeding with pain.  GBS obtained today.  Reviewed delivery preferences.  Plan follow-up in 1 week.

## 2021-06-12 NOTE — PROGRESS NOTES
08/23/17 at 11:18 am - I faxed request to Dr. Garza's office. Will keep an eye out for notes and consent if pt signed one.

## 2021-06-13 NOTE — PROGRESS NOTES
Assessment/Plan:       1. Encounter for postpartum visit  Overall doing well.  EPDS score is 5, which is within normal range.  Patient does not desire therapy or pharmacologic assistance for her mood.  Her baby is colicky.  We discussed this at some length today.  Breast-feeding has been well.  Patient desires IUD for contraception, see below.  Perineum appears to be healed nicely.    2. General counseling and advice for contraceptive management  After a discussion of risks and benefits of multiple methods, patient wishes to try Mirena IUD.  She was given a brochure of information.  We discussed an approximately 1 in 1000 risk of uterine perforation related to this procedure.  She will schedule this at her convenience, at least 10 weeks postpartum.        Subjective:       Ladonna Casiano is a 21 y.o. female who presents for a routine postpartum visit.  She delivered a healthy female infant on 17 via vaginal birth after  section.  She sustained bilateral periurethral and first-degree perineal tears.  These were repaired.  She is unsure if these are healing well, still notes some pain in the vaginal area.  She is exclusively breast-feeding and reports this is going well.  She is interested in hearing about long-acting reversible contraceptives, thinks she has trouble remembering to take an oral pill daily.  Overall her mood has been good, but her baby has been very fussy.  She thinks that this may be slowly improving, which seems to be improving her mood.  She denies any suicidality, denies that her mood makes her have difficulty getting through her day.    The following portions of the patient's history were reviewed and updated as appropriate: allergies, current medications, past medical history and problem list.      Current Outpatient Prescriptions:      benzocaine 20% - menthol 0.5% (DERMOPLAST) 20-0.5 % Aero, Apply 1 application topically every 6 (six) hours as needed., Disp: , Rfl: 0     dibucaine  "(NUPERCAINAL) 1 % ointment, Apply topically as needed for pain (hemorrhoids)., Disp: , Rfl: 0     ibuprofen (ADVIL,MOTRIN) 800 MG tablet, Take 1 tablet (800 mg total) by mouth every 8 (eight) hours., Disp: 30 tablet, Rfl: 0     lanolin (LANSINOH HPA) 100 % Oint, Apply 1 application topically every hour as needed (for sore nipples after evaluation of breastfeeding technique)., Disp: , Rfl: 0     prenatal vitamin iron-folic acid 27mg-0.8mg (PRENATAL S) 27 mg iron- 800 mcg Tab tablet, Take 1 tablet by mouth daily., Disp: , Rfl:     Review of Systems   Pertinent items are noted in HPI.      Objective:      BP 98/62 (Patient Site: Right Arm, Patient Position: Sitting, Cuff Size: Adult Regular)  Pulse 64  Resp 16  Ht 4' 11\" (1.499 m)  Wt 127 lb 8 oz (57.8 kg)  Breastfeeding? Yes  BMI 25.75 kg/m2    General appearance: alert, appears stated age and cooperative  Pelvic: external genitalia normal and tears have healed nicely without residual suture material  Psychiatric: Speech is fluent and thought process is linear, affect is reactive and bright, mood is described as neutral          "

## 2021-06-15 NOTE — PROGRESS NOTES
Assessment/Plan:      Encounter for initial prescription of contraceptive pills  Patient presented today initially for Nexplanon contraceptive implant insertion.  During her rooming process and order for urine pregnancy test, patient commented that she was nervous about the result.  Upon further questioning, she has had unprotected intercourse within the past 2 weeks.  Discussed that for the pregnancy test to be truly accurate prior to the procedure, she needs to have used reliable birth control or practiced abstinence for 2 weeks prior to the procedure.  She wishes to reschedule her procedure.  Her pregnancy test was negative today.  She would like me to send a prescription for progesterone-only contraceptive pills in case she has a gap in rescheduling this procedure appointment.  Discussed using a backup method for at least 2 weeks while starting this pill as well.        Subjective:       Ladonna Casiano is a 22 y.o. female who presents for initially Nexplanon contraceptive implant insertion.  She noted, however that she had had unprotected intercourse within the past 2 weeks as above.  She would like to use a progesterone only contraceptive, which she has used successfully in the past.  She is currently breast-feeding.    The following portions of the patient's history were reviewed and updated as appropriate: allergies, current medications, past medical history, past social history and problem list.      Current Outpatient Prescriptions:      benzocaine 20% - menthol 0.5% (DERMOPLAST) 20-0.5 % Aero, Apply 1 application topically every 6 (six) hours as needed., Disp: , Rfl: 0     dibucaine (NUPERCAINAL) 1 % ointment, Apply topically as needed for pain (hemorrhoids)., Disp: , Rfl: 0     ibuprofen (ADVIL,MOTRIN) 800 MG tablet, Take 1 tablet (800 mg total) by mouth every 8 (eight) hours., Disp: 30 tablet, Rfl: 0     lanolin (LANSINOH HPA) 100 % Oint, Apply 1 application topically every hour as needed (for sore  nipples after evaluation of breastfeeding technique)., Disp: , Rfl: 0     norethindrone (ORTHO MICRONOR) 0.35 mg tablet, Take 1 tablet (0.35 mg total) by mouth daily., Disp: 90 tablet, Rfl: 3     prenatal vitamin iron-folic acid 27mg-0.8mg (PRENATAL S) 27 mg iron- 800 mcg Tab tablet, Take 1 tablet by mouth daily., Disp: , Rfl:     Review of Systems   Pertinent items are noted in HPI.      Objective:      There were no vitals taken for this visit.    General appearance: alert, appears stated age and cooperative  Due to the nature of the visit, no further examination was performed today.        Results for orders placed or performed in visit on 01/22/18   Pregnancy, Urine   Result Value Ref Range    Pregnancy Test, Urine Negative Negative    Specific Gravity, UA 1.010 1.001 - 1.030

## 2021-06-15 NOTE — PROGRESS NOTES
Assessment/Plan:       Encounter for initial prescription of contraceptive pills  Patient presented today initially for Nexplanon contraceptive implant insertion.  During her rooming process and order for urine pregnancy test, patient commented that she was nervous about the result.  Upon further questioning, she has had unprotected intercourse within the past 2 weeks.  Discussed that for the pregnancy test to be truly accurate prior to the procedure, she needs to have used reliable birth control or practiced abstinence for 2 weeks prior to the procedure.  She wishes to reschedule her procedure.  Her pregnancy test was negative today.  She would like me to send a prescription for progesterone-only contraceptive pills in case she has a gap in rescheduling this procedure appointment.  Discussed using a backup method for at least 2 weeks while starting this pill as well.         Subjective:        Ladonna Casiano is a 22 y.o. female who presents for initially Nexplanon contraceptive implant insertion.  She noted, however that she had had unprotected intercourse within the past 2 weeks as above.  She would like to use a progesterone only contraceptive, which she has used successfully in the past.  She is currently breast-feeding.     The following portions of the patient's history were reviewed and updated as appropriate: allergies, current medications, past medical history, past social history and problem list.        Current Outpatient Prescriptions:      benzocaine 20% - menthol 0.5% (DERMOPLAST) 20-0.5 % Aero, Apply 1 application topically every 6 (six) hours as needed., Disp: , Rfl: 0     dibucaine (NUPERCAINAL) 1 % ointment, Apply topically as needed for pain (hemorrhoids)., Disp: , Rfl: 0     ibuprofen (ADVIL,MOTRIN) 800 MG tablet, Take 1 tablet (800 mg total) by mouth every 8 (eight) hours., Disp: 30 tablet, Rfl: 0     lanolin (LANSINOH HPA) 100 % Oint, Apply 1 application topically every hour as needed (for sore  nipples after evaluation of breastfeeding technique)., Disp: , Rfl: 0     norethindrone (ORTHO MICRONOR) 0.35 mg tablet, Take 1 tablet (0.35 mg total) by mouth daily., Disp: 90 tablet, Rfl: 3     prenatal vitamin iron-folic acid 27mg-0.8mg (PRENATAL S) 27 mg iron- 800 mcg Tab tablet, Take 1 tablet by mouth daily., Disp: , Rfl:      Review of Systems   Pertinent items are noted in HPI.       Objective:         Vitals:    01/22/18 1434   BP: 106/68        General appearance: alert, appears stated age and cooperative  Due to the nature of the visit, no further examination was performed today.               Results for orders placed or performed in visit on 01/22/18   Pregnancy, Urine   Result Value Ref Range     Pregnancy Test, Urine Negative Negative     Specific Gravity, UA 1.010 1.001 - 1.030

## 2021-06-15 NOTE — PROGRESS NOTES
Nexplanon Removal    Date/Time: 2/10/2021 2:00 PM  Performed by: Felicia Umana MD  Authorized by: Felicia Umana MD   Intake: left upper arm.  Anesthesia: local infiltration    Anesthesia:  Local Anesthetic: lidocaine 1% with epinephrine  Anesthetic total: 1 mL  1 objects recovered.  Objects recovered: Nexplanon contraceptive implant  Post-procedure assessment: foreign body removed  Comments: After discussion of risks and benefits, patient wishes to have her Nexplanon contraceptive implant removed.  She does not desire reimplantation at present, she wishes to consider her options.  She may have Nexplanon reimplanted, otherwise she may choose to do an intrauterine device for contraception.  Area over the distal aspect of the implant in the left upper arm was cleansed with rubbing alcohol and anesthetized using 1 cc of 1% lidocaine with epinephrine.  After cleansing with Betadine, an approximately 2 mm incision was made and distal aspect of the implant maneuvered to extrude from this incision.  This was then dissected from its fibrous tissue capsule and removed whole without difficulty.  Patient tolerated the procedure well and there were no immediate complications.  Estimated blood loss was minimal.  Incision site was covered with a Steri-Strip and pressure dressing applied.  Patient was advised to remove the pressure dressing after 24 hours then bathe as usual over the Steri-Strip until this falls off on its own.

## 2021-06-16 NOTE — TELEPHONE ENCOUNTER
New Appointment Needed  What is the reason for the visit:    Pregnancy Confirmation Appt Needed  When was the first day of your last menstrual cycle?: No period after Nexaplan removed 2/10  Have you done a home pregnancy test?: Yes: 2 both positive .    Provider Preference: PCP only  How soon do you need to be seen?: next week, Thursday   Waitlist offered?: No  Okay to leave a detailed message:  Yes

## 2021-06-16 NOTE — PROGRESS NOTES
Assessment/Plan:      Nexplanon insertion  After discussion of risks and benefits, patient wished to proceed with Nexplanon insertion.  Consent form was signed.  Patient was prepped and draped in the usual fashion.  Markings were made at 8, 10, and 12 cm proximal to the left olecranon process.  Area was cleansed with rubbing alcohol and tract of insertion anesthetized using 2 cc of 1% lidocaine.  Area of insertion was then cleansed with Betadine and Nexplanon implant inserted under the skin without difficulty.  Patient tolerated the procedure well and there were no immediate complications.  Area of insertion was covered with a Steri-Strip and pressure dressing was placed.  Patient was instructed to leave the pressure dressing in place for 24 hours, then bathe as usual over the Steri-Strip until this falls off on its own.  She can follow-up as needed.  - Pregnancy, Urine        Subjective:       Ladonna Casiano is a 22 y.o. female who presents for Nexplanon contraceptive implant insertion.  Patient previously was taking a progesterone only pill, desires a method of birth control that she does not need to remember to take daily.  She is currently breast-feeding.  She is right-hand dominant.    The following portions of the patient's history were reviewed and updated as appropriate: allergies, current medications, past medical history, past social history and problem list.      Current Outpatient Prescriptions:      prenatal vitamin iron-folic acid 27mg-0.8mg (PRENATAL S) 27 mg iron- 800 mcg Tab tablet, Take 1 tablet by mouth daily., Disp: , Rfl:      benzocaine 20% - menthol 0.5% (DERMOPLAST) 20-0.5 % Aero, Apply 1 application topically every 6 (six) hours as needed., Disp: , Rfl: 0     dibucaine (NUPERCAINAL) 1 % ointment, Apply topically as needed for pain (hemorrhoids)., Disp: , Rfl: 0     ibuprofen (ADVIL,MOTRIN) 800 MG tablet, Take 1 tablet (800 mg total) by mouth every 8 (eight) hours., Disp: 30 tablet, Rfl: 0     " lanolin (LANSINOH HPA) 100 % Oint, Apply 1 application topically every hour as needed (for sore nipples after evaluation of breastfeeding technique)., Disp: , Rfl: 0    Review of Systems   Pertinent items are noted in HPI.      Objective:      /66 (Patient Site: Left Arm, Patient Position: Sitting, Cuff Size: Adult Regular)  Pulse 88  Resp 20  Ht 4' 11\" (1.499 m)  Wt 123 lb 12.8 oz (56.2 kg)  Breastfeeding? Yes  BMI 25 kg/m2    General appearance: alert, appears stated age and cooperative  Extremities: extremities normal, atraumatic, no cyanosis or edema        Results for orders placed or performed in visit on 02/12/18   Pregnancy, Urine   Result Value Ref Range    Pregnancy Test, Urine Negative Negative    Specific Gravity, UA <1.005 1.001 - 1.030       Nexplanon  NDC 3047-4181-11  Lot: [id number]  Exp: 09/2019     "

## 2021-06-17 ENCOUNTER — PRENATAL OFFICE VISIT - HEALTHEAST (OUTPATIENT)
Dept: FAMILY MEDICINE | Facility: CLINIC | Age: 26
End: 2021-06-17

## 2021-06-17 DIAGNOSIS — O09.892 SUPERVISION OF OTHER HIGH RISK PREGNANCIES, SECOND TRIMESTER: ICD-10-CM

## 2021-06-17 DIAGNOSIS — O34.219 PREVIOUS CESAREAN DELIVERY, ANTEPARTUM CONDITION OR COMPLICATION: ICD-10-CM

## 2021-06-17 ASSESSMENT — MIFFLIN-ST. JEOR: SCORE: 1224.05

## 2021-06-17 NOTE — PATIENT INSTRUCTIONS - HE
Patient Education   HEALTHY PREGNANCY CARE: 10-14 WEEKS PREGNANT     By weeks 10 to 14 of your pregnancy, the placenta has formed inside your uterus. It may be possible to hear your baby's heartbeat with a doppler ultrasound device. Your baby's eyes can and do move. The arms and legs can bend.    The second trimester genetic screening tests for Down's Syndrome, Trisomy 18, and neural tube defects (which are known collectively as a quad screen) are done at 15 to 22 weeks. It's your choice whether to have these tests. You and your partner can talk to your midwife or physician about birth defects tests.    Consider breastfeeding for the healthiest way to feed your baby. Ask your midwife or physician for more information.     As your center of gravity and weight changes, use good body mechanics when changing positions and lifting. For example, use a straight back and your legs for support when lifting instead of bending over. Maintain good posture to prevent straining your muscles. Now is a good time to continue or restart your exercise program. Walking 30-60 minutes daily is an excellent way to keep fit. Yoga and swimming also offer many benefits.    The nausea and fatigue of early pregnancy have usually started to let up, so this is a good time to focus on nutrition. Consider attending a nutrition class. A healthy diet includes about 60 grams of protein each day (3-4 servings of dairy, 2-3 servings of meat/fish/poultry/nuts), 4-6 servings of whole grain foods, and 5-6 servings of fruits and vegetables. Remember to drink 6-8 glasses of water daily.    Watch for warning signs, such as     vaginal bleeding    fluid leaking from your vagina    severe abdominal pain    nausea and vomiting more than 4-5 times a day, or if you are unable to keep anything down    fever more than 100.4 degrees F.     Contact your midwife or physician at [facility] at Phone: [phone] if you have these or any  other concerns. If it's after clinic hours, physician patients should call the [facility] at [phone] ([phone]); midwife patients should call their answering service at [phone].    How can you care for yourself at home?   You can refer to the Starting Out Right book or find it online at [email or web address] or [email or web address]-out-right.html#[email or web address]  You can sign up for a weekly parenting e-mail that gives support, tips and advice from health care professionals that starts with pregnancy and continues through the toddler years. To register, go to [email or web address] at any time during your pregnancy.

## 2021-06-17 NOTE — TELEPHONE ENCOUNTER
----- Message from Felicia Umana MD sent at 5/7/2021 11:47 AM CDT -----  Please call patient:  The dating ultrasound looks great, due date 12/11/21.  Please assist patient with making a first OB appointment in about 2 weeks.  LILLIAN Umana MD

## 2021-06-17 NOTE — PROGRESS NOTES
PRENATAL VISIT   FIRST OBSTETRICAL EXAM - OB    Assessment / Plan     1. Supervision of other high risk pregnancies, first trimester  Normal first prenatal visit at 10w5d.  Discussed orientation, general information, lifestyle, nutrition, exercise, warning signs, resources, lab testing and risk screening with patient.  Questions answered.  Initial labs drawn.  Prenatal vitamins.  Problem list reviewed and updated.  Genetic screening test options discussed:  Patient elects to decline all testing  Role of ultrasound in pregnancy discussed; fetal survey: requested.  Follow up: Return in 4 weeks (on 2021) for prenatal care.  - ABO/RH Typing (OP order)  - Hepatitis B Surface antigen (HBsAG)  - HIV Antigen/Antibody Screening Cascade  - HM1(CBC and Differential)  - HML Antibody Screen  - RPR  - Rubella Immune Status (IgG)  - Urinalysis Macroscopic  - Culture, Urine  - Chlamydia/gonorrhoeae VAGINAL    2. Previous  delivery, antepartum condition or complication  Patient has had 1 successful  and would like to try again.  She will have a consult with Ob/Gyn to discuss around 30 weeks gestation.  - ABO/RH Typing (OP order)  - Hepatitis B Surface antigen (HBsAG)  - HIV Antigen/Antibody Screening Cascade  - HM1(CBC and Differential)  - HML Antibody Screen  - RPR  - Rubella Immune Status (IgG)  - Urinalysis Macroscopic  - Culture, Urine  - Chlamydia/gonorrhoeae VAGINAL       Subjective:    Ladonna Casiano is a 25 y.o.  here today for her First Obstetrical Exam.     Patient had a nexplanon contraceptive implant removed in February and was contemplating changing contraceptive methods versus trying to achieve a pregnancy when she became pregnant.  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  She reports nausea with vomiting, most severe last week, but a bit better this week.  She is able to keep down fluids and food this week, has always been able to drink.   Discussed options for nausea and patient continues to decline for now but will let me know between visits if she changes her mind.  She has moved to [place], [place] but still plans to continue prenatal care with me and desires delivery at [facility].  She denies leakage of fluid or bleeding, and has no other concerns today.  She has not yet received her COVID vaccine.    OB History    Para Term  AB Living   3 2 2 0 0 2   SAB TAB Ectopic Multiple Live Births   0 0 0 0 2      # Outcome Date GA Lbr [place]/2nd Weight Sex Delivery Anes PTL Lv   3 Current            2 Term 17 38w1d 14:05 / 00:11 6 lb 12 oz (3.062 kg) F  None N LULY   1 Term 16 40w4d  6 lb 9.5 oz (2.99 kg) M CS-LTranv EPI N LULY      Complications: Failure to Progress in Second Stage, Chorioamnionitis       Expected Date of Delivery: 2021, by Ultrasound    Past Medical History:   Diagnosis Date     Oligohydramnios, third trimester, fetus 1 3/28/2016     Previous  delivery, antepartum condition or complication 3/29/2017    Delivered 16     , delivered, current hospitalization      Past Surgical History:   Procedure Laterality Date      SECTION, LOW TRANSVERSE N/A 2016     Social History     Tobacco Use     Smoking status: Never Smoker     Smokeless tobacco: Former User   Substance Use Topics     Alcohol use: Yes     Alcohol/week: 10.0 standard drinks     Types: 10 Shots of liquor per week     Drug use: No     Current Outpatient Medications   Medication Sig Dispense Refill     prenatal vitamin iron-folic acid 27mg-0.8mg (PRENATAL S) 27 mg iron- 800 mcg Tab tablet Take 1 tablet by mouth daily.       No current facility-administered medications for this visit.      No Known Allergies          High Risk Behavior: Drank any alcohol since LMP    Review of Systems  General:  Denies problem  Eyes: Denies problem  Ears/Nose/Throat: Denies problem  Cardiovascular: Denies problem  Respiratory:   "Denies problem  Gastrointestinal:  Denies problem, Genitourinary: Denies problem  Musculoskeletal:  Denies problem  Skin: Denies problem  Neurologic: Denies problem  Psychiatric: Denies problem  Endocrine: Denies problem  Heme/Lymphatic: Denies problem   Allergic/Immunologic: Denies problem       Objective:   Objective    Vitals:    05/20/21 1355   BP: 108/68   Pulse: (!) 108   Weight: 129 lb 9.6 oz (58.8 kg)   Height: 4' 11.1\" (1.501 m)     Physical Exam:  General Appearance: Alert, cooperative, no distress, appears stated age  Head: Normocephalic, without obvious abnormality, atraumatic  Eyes: PERRL, conjunctiva/corneas clear, EOM's intact  Ears: Normal TM's and external ear canals, both ears  Nose: Nares normal, septum midline,mucosa normal, no drainage  Throat: Lips, mucosa, and tongue normal; teeth and gums normal  Neck: Supple, symmetrical, trachea midline, no adenopathy;  thyroid: not enlarged, symmetric, no tenderness/mass/nodules; no carotid bruit or JVD  Back: Symmetric, no curvature, ROM normal, no CVA tenderness  Lungs: Clear to auscultation bilaterally, respirations unlabored  Breasts: No breast masses, tenderness, asymmetry, or nipple discharge.  Heart: Regular rate and rhythm, S1 and S2 normal, no murmur, rub, or gallop, Abdomen: Soft, non-tender, bowel sounds active all four quadrants,  no masses, no organomegaly  Pelvic:External genitalia appear normal.  Speculum exam not performed.  Extremities: Extremities normal, atraumatic, no cyanosis or edema  Skin: Skin color, texture, turgor normal, no rashes or lesions  Lymph nodes: Cervical, supraclavicular, and axillary nodes normal  Neurologic: Normal     Lab:   Results for orders placed or performed in visit on 05/20/21   Chlamydia/gonorrhoeae VAGINAL    Specimen: Vaginal; Body Fluid   Result Value Ref Range    Chlamydia trachomatis, Amplified Detection Negative Negative    Neisseria gonorrhoeae, Amplified Detection Negative Negative   ABO/RH Typing (OP " order)   Result Value Ref Range    HML ABO/Rh Typing O POS     HML ABO/Rh Repeat Typing O POS    Hepatitis B Surface antigen (HBsAG)   Result Value Ref Range    Hepatitis B Surface Ag Negative Negative   HIV Antigen/Antibody Screening Cascade   Result Value Ref Range    HIV Antigen / Antibody Negative Negative   HML Antibody Screen   Result Value Ref Range    HML Antibody Screen Negative    RPR   Result Value Ref Range    Treponema Antibody (Syphilis) Negative Negative   Rubella Immune Status (IgG)   Result Value Ref Range    Rubella Antibody, IgG Positive    Urinalysis Macroscopic   Result Value Ref Range    Color, UA Yellow Colorless, Yellow, Straw, Light Yellow    Clarity, UA Clear Clear    Glucose, UA Negative Negative    Bilirubin, UA Negative Negative    Ketones, UA Negative Negative    Specific Gravity, UA 1.025 1.005 - 1.030    Blood, UA Negative Negative    pH, UA 7.0 5.0 - 8.0    Protein, UA Trace (!) Negative    Urobilinogen, UA 0.2 E.U./dL 0.2 E.U./dL, 1.0 E.U./dL    Nitrite, UA Negative Negative    Leukocytes, UA Trace (!) Negative   HM1 (CBC with Diff)   Result Value Ref Range    WBC 10.4 4.0 - 11.0 thou/uL    RBC 4.24 3.80 - 5.40 mill/uL    Hemoglobin 13.1 12.0 - 16.0 g/dL    Hematocrit 38.3 35.0 - 47.0 %    MCV 90 80 - 100 fL    MCH 30.9 27.0 - 34.0 pg    MCHC 34.2 32.0 - 36.0 g/dL    RDW 12.1 11.0 - 14.5 %    Platelets 348 140 - 440 thou/uL    MPV 10.6 8.5 - 12.5 fL    Neutrophils % 77 (H) 50 - 70 %    Lymphocytes % 16 (L) 20 - 40 %    Monocytes % 5 2 - 10 %    Eosinophils % 1 0 - 6 %    Basophils % 1 0 - 2 %    Immature Granulocyte % 0 <=0 %    Neutrophils Absolute 8.0 (H) 2.0 - 7.7 thou/uL    Lymphocytes Absolute 1.7 0.8 - 4.4 thou/uL    Monocytes Absolute 0.5 0.0 - 0.9 thou/uL    Eosinophils Absolute 0.1 0.0 - 0.4 thou/uL    Basophils Absolute 0.1 0.0 - 0.2 thou/uL    Immature Granulocyte Absolute 0.0 <=0.0 thou/uL

## 2021-06-17 NOTE — TELEPHONE ENCOUNTER
New Appointment Needed  What is the reason for the visit:    First OB Appt Request  Have you had a pregnancy confirmation appointment at a [facility] primary care clinic?:  Yes, 4/27  When was your positive home pregnancy test?:   5/07 ultra sound   Provider Preference: PCP only  How soon do you need to be seen?: 5/18 first open appointment 6/01  Waitlist offered?: No  Okay to leave a detailed message:  Yes

## 2021-06-18 NOTE — PROGRESS NOTES
Assessment/Plan:         1. Nausea  Thyroid Cascade    HM2(CBC w/o Differential)    Basic Metabolic Panel   2. Dizziness  Thyroid Cascade    HM2(CBC w/o Differential)    Basic Metabolic Panel   3. Neck swelling  Thyroid Cascade     Etiology of the nausea and dizziness certainly could be secondary to her Nexplanon.  However in light of this I will check a thyroid cascade as well as with the symptoms of the sensation of neck swelling there could certainly be a lesion present however I do believe that the swelling in her neck is muscle spasm of the sternocleidomastoid.  Will get a thyroid cascade however to assess for any abnormalities.  In addition I will also check a basic metabolic panel and a CBC.  She does not have any acute anemia present.  Patient is comfortable with this plan of care and feels reassured just checking things over.  Recommend following up as needed otherwise.  She will be notified of the results of her blood work when completed.    Subjective:      Ladonna Casiano is a 22 y.o. female who presents for concerns of swelling in her neck. She hasn't had any recent illness. She is also having some nausea and dizziness and isn't sure if this is side effects from her nexplanon. This was inserted at the end of February.  She also had cold about three months ago. She has not had any fevers. No other swelling of lymph nodes. She had some headaches but these improrved after she got new contacts. She has two bruises that she doesn't recall getting but denies any significant history of easy bruising. She has a two year old at home as well as an 8 month old she is currently breast feeding. She has not had any unexplained fatigue.   She denies any chest pain, shortness of breath, or difficulty breathing.  She also denies any vomiting along with her nausea.  No significant constipation or diarrhea is present.    The following portions of the patient's history were reviewed and updated as appropriate: allergies,  "current medications and problem list.    Review of Systems   A 12 point comprehensive review of systems was negative except as noted.      Objective:      /88  Pulse 84  Temp 97.5  F (36.4  C)  Resp 16  Wt 125 lb (56.7 kg)  BMI 25.25 kg/m2    General Appearance: Alert, cooperative, appears well  Head: Normocephalic, without obvious abnormality, atraumatic.  Eyes: PERRL, conjunctiva/corneas clear, EOM's intact.  Ears: Normal TM's and external ear canals, both ears.  Nose: Nares WNL  Throat: Slightly erythematous. No exudates.  Neck: Supple, symmetrical, trachea midline, no adenopathy.  Right sternal glenoid mastoid muscle appears and feels slightly larger than the left.  This is where patient is also feeling the increased \"swelling\".  Heart : normal rate, regular rhythm, normal S1, S2, no murmurs, rubs, clicks or gallops.  Lungs: Clear to auscultation bilaterally, respirations unlabored.  Extremities: Extremities normal, atraumatic, no cyanosis or edema.  Lymph nodes: Cervical, supraclavicular, and axillary nodes normal.      Recent Results (from the past 168 hour(s))   HM2(CBC w/o Differential)   Result Value Ref Range    WBC 8.4 4.0 - 11.0 thou/uL    RBC 4.74 3.80 - 5.40 mill/uL    Hemoglobin 14.6 12.0 - 16.0 g/dL    Hematocrit 43.4 35.0 - 47.0 %    MCV 92 80 - 100 fL    MCH 30.8 27.0 - 34.0 pg    MCHC 33.6 32.0 - 36.0 g/dL    RDW 10.3 (L) 11.0 - 14.5 %    Platelets 379 140 - 440 thou/uL    MPV 6.8 (L) 7.0 - 10.0 fL         "

## 2021-06-18 NOTE — PATIENT INSTRUCTIONS - HE
"Patient Instructions by Leeanna Solis CNP at 1/24/2021  1:40 PM     Author: Leeanna Solis CNP Service: -- Author Type: Nurse Practitioner    Filed: 1/24/2021  2:35 PM Encounter Date: 1/24/2021 Status: Signed    : Leeanna Solis CNP (Nurse Practitioner)         Patient Education     Bladder Infection, Female (Adult)    Urine is normally doesn't have any bacteria in it. But bacteria can get into the urinary tract from the skin around the rectum. Or they can travel in the blood from elsewhere in the body. Once they are in your urinary tract, they can cause infection in the urethra (urethritis), the bladder (cystitis), or the kidneys (pyelonephritis).  The most common place for an infection is in the bladder. This is called a bladder infection. This is one of the most common infections in women. Most bladder infections are easily treated. They are not serious unless the infection spreads to the kidney.  The phrases \"bladder infection,\" \"UTI,\" and \"cystitis\" are often used to describe the same thing. But they are not always the same. Cystitis is an inflammation of the bladder. The most common cause of cystitis is an infection.  Symptoms  The infection causes inflammation in the urethra and bladder. This causes many of the symptoms. The most common symptoms of a bladder infection are:    Pain or burning when urinating    Having to urinate more often than usual    Urgent need to urinate    Only a small amount of urine comes out    Blood in urine    Abdominal discomfort. This is usually in the lower abdomen above the pubic bone.    Cloudy urine    Strong- or bad-smelling urine    Unable to urinate (urinary retention)    Unable to hold urine in (urinary incontinence)    Fever    Loss of appetite    Confusion (in older adults)  Causes  Bladder infections are not contagious. You can't get one from someone else, from a toilet seat, or from sharing a bath.  The most common cause of bladder infections is " bacteria from the bowels. The bacteria get onto the skin around the opening of the urethra. From there, they can get into the urine and travel up to the bladder, causing inflammation and infection. This usually happens because of:    Wiping improperly after urinating. Always wipe from front to back.    Bowel incontinence    Pregnancy    Procedures such as having a catheter inserted    Older age    Not emptying your bladder. This can allow bacteria a chance to grow in your urine.    Dehydration    Constipation    Sex    Use of a diaphragm for birth control   Treatment  Bladder infections are diagnosed by a urine test. They are treated with antibiotics and usually clear up quickly without complications. Treatment helps prevent a more serious kidney infection.  Medicines  Medicines can help in the treatment of a bladder infection:    Take antibiotics until they are used up, even if you feel better. It is important to finish them to make sure the infection has cleared.    You can use acetaminophen or ibuprofen for pain, fever, or discomfort, unless another medicine was prescribed. If you have chronic liver or kidney disease, talk with your healthcare provider before using these medicines. Also talk with your provider if you've ever had a stomach ulcer or gastrointestinal bleeding, or are taking blood-thinner medicines.    If you are given phenazopydridine to reduce burning with urination, it will cause your urine to become a bright orange color. This can stain clothing.  Care and prevention  These self-care steps can help prevent future infections:    Drink plenty of fluids to prevent dehydration and flush out your bladder. Do this unless you must restrict fluids for other health reasons, or your doctor told you not to.    Proper cleaning after going to the bathroom is important. Wipe from front to back after using the toilet to prevent the spread of bacteria.    Urinate more often. Don't try to hold urine in for a long  time.    Wear loose-fitting clothes and cotton underwear. Avoid tight-fitting pants.    Improve your diet and prevent constipation. Eat more fresh fruit and vegetables, and fiber, and less junk and fatty foods.    Avoid sex until your symptoms are gone.    Avoid caffeine, alcohol, and spicy foods. These can irritate your bladder.    Urinate right after intercourse to flush out your bladder.    If you use birth control pills and have frequent bladder infections, discuss it with your doctor.  Follow-up care  Call your healthcare provider if all symptoms are not gone after 3 days of treatment. This is especially important if you have repeat infections.  If a culture was done, you will be told if your treatment needs to be changed. If directed, you can call to find out the results.  If X-rays were done, you will be told if the results will affect your treatment.  Call 911  Call 911 if any of the following occur:    Trouble breathing    Hard to wake up or confusion    Fainting or loss of consciousness    Rapid heart rate  When to seek medical advice  Call your healthcare provider right away if any of these occur:    Fever of 100.4 F (38.0 C) or higher, or as directed by your healthcare provider    Symptoms are not better by the third day of treatment    Back or belly (abdominal) pain that gets worse    Repeated vomiting, or unable to keep medicine down    Weakness or dizziness    Vaginal discharge    Pain, redness, or swelling in the outer vaginal area (labia)  Date Last Reviewed: 10/1/2016    7557-6915 [employer]. [address] [place] [address], [place] [address]. All rights reserved. This information is not intended as a substitute for professional medical care. Always follow your healthcare professional's instructions.

## 2021-06-20 NOTE — PROGRESS NOTES
"  Assessment/Plan:      1. Sore throat  Patient does have some asymmetry of her tonsils, but only mild tonsillar hypertrophy and no visible stones or signs of abscess.  Her symptoms do not seem to merit referral to ENT for consideration of tonsillectomy at this point.  Discussed salt water gargles and anti-inflammatories as needed, follow up if not improving.  - Rapid Strep A Screen- Throat Swab  - Group A Strep, RNA Direct Detection, Throat    2. Nausea  Unclear etiology.  Suggested patient try ranitidine taken at bedtime for 2 weeks.  If nausea is improved, she can cut back to using this as needed.        Subjective:       Ladonna Casiano is a 22 y.o. female who presents for evaluation of a swollen right tonsil.  She says this has been coming and going for about 2 months.  Intermittently she will have some soreness in her right throat.  She also has nausea that comes and goes which she attributes to her Nexplanon contraceptive implant.  She has occasional discomfort with swallowing, never gets food stuck in the throat.  She denies symptoms consistent with tonsillar stones, denies fever or chills.  Her nausea comes on in the morning and after work, but does not seem relieved by eating.  Her stools are normal, she has not vomited.  She denies heartburn or abdominal pain.    The following portions of the patient's history were reviewed and updated as appropriate: allergies, current medications, past medical history, past surgical history and problem list.      Current Outpatient Prescriptions:      prenatal vitamin iron-folic acid 27mg-0.8mg (PRENATAL S) 27 mg iron- 800 mcg Tab tablet, Take 1 tablet by mouth daily., Disp: , Rfl:     Review of Systems   Pertinent items are noted in HPI.      Objective:      /72 (Patient Site: Right Arm, Patient Position: Sitting, Cuff Size: Adult Regular)  Pulse 84  Resp 16  Ht 4' 11\" (1.499 m)  Wt 128 lb 14.4 oz (58.5 kg)  Breastfeeding? Yes  BMI 26.03 kg/m2    General " appearance: alert, appears stated age and cooperative  Head: Normocephalic, without obvious abnormality, atraumatic  Eyes: conjunctivae clear, sclerae anicteric  Ears: normal TM's and external ear canals both ears  Nose: Nares normal. Septum midline. Mucosa normal. No drainage or sinus tenderness.  Throat: oral mucosa moist, posterior pharynx with 2+ right tonsil, 1+ left tonsil, mildly cryptic right tonsil without erythema or exudate  Neck: no adenopathy, supple, symmetrical, trachea midline and thyroid not enlarged, symmetric, no tenderness/mass/nodules  Lungs: clear to auscultation bilaterally  Heart: regular rate and rhythm, S1, S2 normal, no murmur, click, rub or gallop        Results for orders placed or performed in visit on 08/29/18   Rapid Strep A Screen- Throat Swab   Result Value Ref Range    Rapid Strep A Antigen No Group A Strep detected, presumptive negative No Group A Strep detected, presumptive negative   Group A Strep, RNA Direct Detection, Throat   Result Value Ref Range    Group A Strep by PCR No Group A Strep rRNA detected No Group A Strep rRNA detected

## 2021-06-26 ENCOUNTER — HEALTH MAINTENANCE LETTER (OUTPATIENT)
Age: 26
End: 2021-06-26

## 2021-06-26 NOTE — PROGRESS NOTES
Patient reports that she has been feeling much better.  Her nausea and indigestion symptoms have improved greatly.  She tried using some Unisom and B6, feels that this went well.  She denies leakage of fluid or bleeding.  She is unsure about any fetal movement, possibly feeling subtle movements here and there.  She does desire to find out gender of this baby.  She questions whether she should have gained more weight by now.  She is eating frequent small meals, does not have a great appetite due to nausea.  Discussed that as long as she is not restricting calories unable to keep down food and fluids, we will likely see weight gain over time.  She is able to take her prenatal vitamin as well.  Plan next routine visit in 4 weeks with order for fetal survey ultrasound next visit.

## 2021-06-26 NOTE — PATIENT INSTRUCTIONS - HE
"  Patient Education   HEALTHY PREGNANCY CARE: 14 to 18 WEEKS PREGNANT    During this time, you may start to \"show,\" so that you look pregnant to people around you. You may also notice some changes in your skin, such as an increase in acne on your face. You may notice your heart pounding, a sharp stretching ache on either side of your lower abdomen (round ligament), and more vaginal discharge.     Your baby's nerves and muscles are maturing. Sex organs are recognizable. Your baby is now able to pass urine, and your baby's first stool (meconium) is starting to collect in his or her intestines. Hair is also beginning to grow on your baby's head. Your baby is moving freely inside your uterus but you may not be able to feel it until 18-20 weeks.    Continue making healthy choices for your baby during pregnancy, including good nutrition, exercise and a safe environment free from smoking, alcohol and drugs.    Your genetic screening with a quad screen blood test may have been done today.    Watch for warning signs and contact your midwife or physician at the clinic with any concerns at [facility] at Phone: [phone]. For example, call about cramping, bleeding, abdominal pain, watery vaginal discharge, or if you are unable to keep fluids down for more than 24 hours due to vomiting.   If it's after clinic hours, physician patients should call the [facility] at [phone] ([phone]); midwife patients should call their answering service at [phone].    How can you care for yourself at home?   You can refer to the Starting Out Right book or find it online at [email or web address] or [email or web address]     You can sign up for a weekly parenting e-mail that gives support, tips and advice from health care professionals that starts " with pregnancy and continues through the toddler years. To register, go to [email or web address] at any time during your pregnancy.

## 2021-06-30 NOTE — PROGRESS NOTES
Progress Notes by Leeanna Solis CNP at 2021  1:40 PM     Author: Leeanna Solis CNP Service: -- Author Type: Nurse Practitioner    Filed: 2021  3:03 PM Encounter Date: 2021 Status: Signed    : Leeanna Solis CNP (Nurse Practitioner)       Chief Complaint   Patient presents with   ? Urinary Frequency     since yesterday       HPI:  Ladonna Casiano is a 25 y.o. female who presents today complaining of worsening urinary frequency over the past one day. Reports one male partner, with STD concerns. Reports no OTC medications taken or increased hydration. Patient reports no other symptoms or concerns.    History obtained from the patient.  LMP: irrregular, on Nexplanon       Problem List:  2018: Nexplanon insertion  2017: Pregnant  2017: History of chorioamnionitis  2017: Previous  delivery, antepartum condition or   complication  2016: Pregnant  2016: Pregnancy  2016: Oligohydramnios, third trimester, fetus 1  , delivered, current hospitalization      Past Medical History:   Diagnosis Date   ? Oligohydramnios, third trimester, fetus 1 3/28/2016   ? Previous  delivery, antepartum condition or complication 3/29/2017    Delivered 16   ? , delivered, current hospitalization        Social History     Tobacco Use   ? Smoking status: Never Smoker   ? Smokeless tobacco: Former User   Substance Use Topics   ? Alcohol use: Yes     Alcohol/week: 0.0 - 1.0 standard drinks     Comment: rare       Review of Systems   Constitutional: Negative for chills and fever.   Gastrointestinal: Negative for abdominal pain and diarrhea.   Genitourinary: Positive for dysuria and frequency. Negative for dyspareunia, flank pain, urgency, vaginal bleeding and vaginal discharge.   All other systems reviewed and are negative.      Vitals:    21 1346   BP: 129/84   Patient Site: Right Arm   Patient Position: Sitting   Cuff Size: Adult Regular   Pulse: 94   Resp: 16    Temp: 98.1  F (36.7  C)   SpO2: 96%   Weight: 136 lb 9 oz (61.9 kg)       Physical Exam  Constitutional:       Appearance: Normal appearance. She is not ill-appearing or diaphoretic.   Cardiovascular:      Rate and Rhythm: Normal rate and regular rhythm.      Pulses: Normal pulses.      Heart sounds: Normal heart sounds.   Pulmonary:      Effort: Pulmonary effort is normal.      Breath sounds: Normal breath sounds.   Abdominal:      General: There is no distension.      Palpations: Abdomen is soft. There is no mass.      Tenderness: There is no abdominal tenderness. There is no right CVA tenderness, left CVA tenderness, guarding or rebound.   Skin:     General: Skin is warm.      Capillary Refill: Capillary refill takes less than 2 seconds.      Coloration: Skin is not pale.      Findings: No erythema or rash.   Neurological:      Mental Status: She is alert and oriented to person, place, and time.   Psychiatric:         Behavior: Behavior normal.         No notes on file    Labs:  Recent Results (from the past 72 hour(s))   Urinalysis-UC if Indicated   Result Value Ref Range    Color, UA Yellow Colorless, Yellow, Straw, Light Yellow    Clarity, UA Clear Clear    Glucose, UA Negative Negative    Bilirubin, UA Negative Negative    Ketones, UA Negative Negative    Specific Gravity, UA 1.015 1.005 - 1.030    Blood, UA Trace (!) Negative    pH, UA 6.5 5.0 - 8.0    Protein, UA Negative Negative mg/dL    Urobilinogen, UA 0.2 E.U./dL 0.2 E.U./dL, 1.0 E.U./dL    Nitrite, UA Negative Negative    Leukocytes, UA Small (!) Negative    Bacteria, UA Few (!) None Seen hpf    RBC, UA 5-10 (!) None Seen, 0-2 hpf    WBC, UA 25-50 (!) None Seen, 0-5 hpf    Squam Epithel, UA 0-5 None Seen, 0-5 lpf       Radiology:None obtained    Clinical Decision Making: At the end of the encounter, I discussed results, diagnosis, medications. Discussed urine finding and urine culture process, need for treatment and increased water intake. Reviewed  "indications for follow up if no improvement. Patient understood and agreed to plan.       ARYA Vick, CNP       1. Acute cystitis without hematuria  nitrofurantoin, macrocrystal-monohydrate, (MACROBID) 100 MG capsule   2. Urinary frequency  Urinalysis-UC if Indicated    Culture, Urine         Patient Instructions     Patient Education     Bladder Infection, Female (Adult)    Urine is normally doesn't have any bacteria in it. But bacteria can get into the urinary tract from the skin around the rectum. Or they can travel in the blood from elsewhere in the body. Once they are in your urinary tract, they can cause infection in the urethra (urethritis), the bladder (cystitis), or the kidneys (pyelonephritis).  The most common place for an infection is in the bladder. This is called a bladder infection. This is one of the most common infections in women. Most bladder infections are easily treated. They are not serious unless the infection spreads to the kidney.  The phrases \"bladder infection,\" \"UTI,\" and \"cystitis\" are often used to describe the same thing. But they are not always the same. Cystitis is an inflammation of the bladder. The most common cause of cystitis is an infection.  Symptoms  The infection causes inflammation in the urethra and bladder. This causes many of the symptoms. The most common symptoms of a bladder infection are:    Pain or burning when urinating    Having to urinate more often than usual    Urgent need to urinate    Only a small amount of urine comes out    Blood in urine    Abdominal discomfort. This is usually in the lower abdomen above the pubic bone.    Cloudy urine    Strong- or bad-smelling urine    Unable to urinate (urinary retention)    Unable to hold urine in (urinary incontinence)    Fever    Loss of appetite    Confusion (in older adults)  Causes  Bladder infections are not contagious. You can't get one from someone else, from a toilet seat, or from sharing a bath.  The " most common cause of bladder infections is bacteria from the bowels. The bacteria get onto the skin around the opening of the urethra. From there, they can get into the urine and travel up to the bladder, causing inflammation and infection. This usually happens because of:    Wiping improperly after urinating. Always wipe from front to back.    Bowel incontinence    Pregnancy    Procedures such as having a catheter inserted    Older age    Not emptying your bladder. This can allow bacteria a chance to grow in your urine.    Dehydration    Constipation    Sex    Use of a diaphragm for birth control   Treatment  Bladder infections are diagnosed by a urine test. They are treated with antibiotics and usually clear up quickly without complications. Treatment helps prevent a more serious kidney infection.  Medicines  Medicines can help in the treatment of a bladder infection:    Take antibiotics until they are used up, even if you feel better. It is important to finish them to make sure the infection has cleared.    You can use acetaminophen or ibuprofen for pain, fever, or discomfort, unless another medicine was prescribed. If you have chronic liver or kidney disease, talk with your healthcare provider before using these medicines. Also talk with your provider if you've ever had a stomach ulcer or gastrointestinal bleeding, or are taking blood-thinner medicines.    If you are given phenazopydridine to reduce burning with urination, it will cause your urine to become a bright orange color. This can stain clothing.  Care and prevention  These self-care steps can help prevent future infections:    Drink plenty of fluids to prevent dehydration and flush out your bladder. Do this unless you must restrict fluids for other health reasons, or your doctor told you not to.    Proper cleaning after going to the bathroom is important. Wipe from front to back after using the toilet to prevent the spread of bacteria.    Urinate more  often. Don't try to hold urine in for a long time.    Wear loose-fitting clothes and cotton underwear. Avoid tight-fitting pants.    Improve your diet and prevent constipation. Eat more fresh fruit and vegetables, and fiber, and less junk and fatty foods.    Avoid sex until your symptoms are gone.    Avoid caffeine, alcohol, and spicy foods. These can irritate your bladder.    Urinate right after intercourse to flush out your bladder.    If you use birth control pills and have frequent bladder infections, discuss it with your doctor.  Follow-up care  Call your healthcare provider if all symptoms are not gone after 3 days of treatment. This is especially important if you have repeat infections.  If a culture was done, you will be told if your treatment needs to be changed. If directed, you can call to find out the results.  If X-rays were done, you will be told if the results will affect your treatment.  Call 911  Call 911 if any of the following occur:    Trouble breathing    Hard to wake up or confusion    Fainting or loss of consciousness    Rapid heart rate  When to seek medical advice  Call your healthcare provider right away if any of these occur:    Fever of 100.4 F (38.0 C) or higher, or as directed by your healthcare provider    Symptoms are not better by the third day of treatment    Back or belly (abdominal) pain that gets worse    Repeated vomiting, or unable to keep medicine down    Weakness or dizziness    Vaginal discharge    Pain, redness, or swelling in the outer vaginal area (labia)  Date Last Reviewed: 10/1/2016    1089-0489 [employer]. [address] [place] [address], [place] 5101767. All rights reserved. This information is not intended as a substitute for professional medical care. Always follow your healthcare professional's instructions.

## 2021-07-01 ENCOUNTER — DOCUMENTATION ONLY (OUTPATIENT)
Dept: ADMINISTRATIVE | Facility: OTHER | Age: 26
End: 2021-07-01

## 2021-07-01 NOTE — PROGRESS NOTES
This encounter was created as part of manual pregnancy episode data conversion for the single EHR project. The following information (where applicable) was manually abstracted from the [employer] Epic instance on July 1, 2021: pregnancy episode name/date, dating, episode encounter linking, pregravid weight, number of fetuses, and pregnancy overview and plan.     Chelsey Kearney RN   Clinical Informatics

## 2021-07-03 NOTE — ADDENDUM NOTE
Addendum Note by Felicia Umana MD at 10/28/2019  2:30 PM     Author: Felicia Umana MD Service: -- Author Type: Physician    Filed: 10/28/2019  5:20 PM Encounter Date: 10/28/2019 Status: Signed    : Felicia Umana MD (Physician)    Addended by: FELICIA UMANA on: 10/28/2019 05:20 PM        Modules accepted: Orders

## 2021-07-06 VITALS
BODY MASS INDEX: 26.08 KG/M2 | HEIGHT: 59 IN | SYSTOLIC BLOOD PRESSURE: 105 MMHG | HEART RATE: 70 BPM | WEIGHT: 129.38 LBS | DIASTOLIC BLOOD PRESSURE: 69 MMHG

## 2021-07-14 PROBLEM — Z34.90 PREGNANT: Status: RESOLVED | Noted: 2017-09-15 | Resolved: 2017-11-02

## 2021-07-14 PROBLEM — O34.219 PREVIOUS CESAREAN DELIVERY, ANTEPARTUM CONDITION OR COMPLICATION: Status: RESOLVED | Noted: 2017-03-29 | Resolved: 2017-11-02

## 2021-07-14 PROBLEM — Z30.017 NEXPLANON INSERTION: Status: RESOLVED | Noted: 2018-02-12 | Resolved: 2021-02-10

## 2021-07-15 ENCOUNTER — PRENATAL OFFICE VISIT (OUTPATIENT)
Dept: FAMILY MEDICINE | Facility: CLINIC | Age: 26
End: 2021-07-15
Payer: MEDICAID

## 2021-07-15 VITALS
BODY MASS INDEX: 25.91 KG/M2 | SYSTOLIC BLOOD PRESSURE: 118 MMHG | HEIGHT: 60 IN | HEART RATE: 80 BPM | WEIGHT: 132 LBS | DIASTOLIC BLOOD PRESSURE: 71 MMHG

## 2021-07-15 DIAGNOSIS — Z34.80 PRENATAL CARE, SUBSEQUENT PREGNANCY, UNSPECIFIED TRIMESTER: Primary | ICD-10-CM

## 2021-07-15 PROCEDURE — 99207 PR PRENATAL VISIT: CPT | Performed by: FAMILY MEDICINE

## 2021-07-15 ASSESSMENT — MIFFLIN-ST. JEOR: SCORE: 1250.49

## 2021-07-15 NOTE — PROGRESS NOTES
Patient reports that overall she has been feeling well.  She denies any further nausea.  She denies leakage of fluid or bleeding.  She has had some vaginal discharge, but no itching or burning.  She reports good fetal movement.  Concerns today: None  Discussed PTL, PROM, and when to call or come in.  Reportable signs and symptoms discussed.  Assisted patient in arranging her fetal survey ultrasound today.  Plan next routine visit in 4 weeks.    Felicia Umana

## 2021-07-22 ENCOUNTER — HOSPITAL ENCOUNTER (OUTPATIENT)
Dept: ULTRASOUND IMAGING | Facility: HOSPITAL | Age: 26
Discharge: HOME | End: 2021-07-22
Attending: FAMILY MEDICINE | Admitting: FAMILY MEDICINE
Payer: MEDICAID

## 2021-07-22 DIAGNOSIS — Z34.80 PRENATAL CARE, SUBSEQUENT PREGNANCY, UNSPECIFIED TRIMESTER: ICD-10-CM

## 2021-07-22 PROCEDURE — 76805 OB US >/= 14 WKS SNGL FETUS: CPT

## 2021-08-12 ENCOUNTER — PRENATAL OFFICE VISIT (OUTPATIENT)
Dept: FAMILY MEDICINE | Facility: CLINIC | Age: 26
End: 2021-08-12
Payer: MEDICAID

## 2021-08-12 VITALS
SYSTOLIC BLOOD PRESSURE: 113 MMHG | RESPIRATION RATE: 14 BRPM | TEMPERATURE: 97.1 F | OXYGEN SATURATION: 97 % | HEART RATE: 96 BPM | BODY MASS INDEX: 26.71 KG/M2 | DIASTOLIC BLOOD PRESSURE: 79 MMHG | WEIGHT: 135.4 LBS

## 2021-08-12 DIAGNOSIS — O34.219 PREVIOUS CESAREAN DELIVERY, ANTEPARTUM CONDITION OR COMPLICATION: ICD-10-CM

## 2021-08-12 DIAGNOSIS — O09.92 SUPERVISION OF HIGH RISK PREGNANCY IN SECOND TRIMESTER: Primary | ICD-10-CM

## 2021-08-12 PROCEDURE — 99207 PR PRENATAL VISIT: CPT | Performed by: FAMILY MEDICINE

## 2021-08-21 PROBLEM — O09.92 SUPERVISION OF HIGH RISK PREGNANCY IN SECOND TRIMESTER: Status: ACTIVE | Noted: 2021-08-21

## 2021-08-21 PROBLEM — O34.219 PREVIOUS CESAREAN DELIVERY, ANTEPARTUM CONDITION OR COMPLICATION: Status: ACTIVE | Noted: 2021-08-21

## 2021-09-14 ENCOUNTER — PRENATAL OFFICE VISIT (OUTPATIENT)
Dept: FAMILY MEDICINE | Facility: CLINIC | Age: 26
End: 2021-09-14
Payer: MEDICAID

## 2021-09-14 VITALS
DIASTOLIC BLOOD PRESSURE: 74 MMHG | WEIGHT: 136.5 LBS | HEART RATE: 87 BPM | BODY MASS INDEX: 26.93 KG/M2 | SYSTOLIC BLOOD PRESSURE: 108 MMHG

## 2021-09-14 DIAGNOSIS — O09.92 SUPERVISION OF HIGH RISK PREGNANCY IN SECOND TRIMESTER: Primary | ICD-10-CM

## 2021-09-14 DIAGNOSIS — Z23 NEED FOR PROPHYLACTIC VACCINATION AND INOCULATION AGAINST INFLUENZA: ICD-10-CM

## 2021-09-14 DIAGNOSIS — O34.219 PREVIOUS CESAREAN DELIVERY, ANTEPARTUM CONDITION OR COMPLICATION: ICD-10-CM

## 2021-09-14 DIAGNOSIS — R73.09 ABNORMAL GLUCOSE TOLERANCE TEST: ICD-10-CM

## 2021-09-14 LAB
GLUCOSE 1H P 50 G GLC PO SERPL-MCNC: 142 MG/DL (ref 70–129)
HGB BLD-MCNC: 12.7 G/DL (ref 11.7–15.7)

## 2021-09-14 PROCEDURE — 90471 IMMUNIZATION ADMIN: CPT | Performed by: FAMILY MEDICINE

## 2021-09-14 PROCEDURE — 82952 GTT-ADDED SAMPLES: CPT | Performed by: FAMILY MEDICINE

## 2021-09-14 PROCEDURE — 36415 COLL VENOUS BLD VENIPUNCTURE: CPT | Performed by: FAMILY MEDICINE

## 2021-09-14 PROCEDURE — 86780 TREPONEMA PALLIDUM: CPT | Performed by: FAMILY MEDICINE

## 2021-09-14 PROCEDURE — 90686 IIV4 VACC NO PRSV 0.5 ML IM: CPT | Performed by: FAMILY MEDICINE

## 2021-09-14 PROCEDURE — 99207 PR PRENATAL VISIT: CPT | Performed by: FAMILY MEDICINE

## 2021-09-14 NOTE — PATIENT INSTRUCTIONS
Check with your insurance about where you can get your pump.  We are called [facility] [place].  If we are a covered supplier, you can get your pump at your next OB visit.

## 2021-09-14 NOTE — PROGRESS NOTES
Patient reports that overall she has been feeling well.  She denies any leakage of fluid or bleeding, denies headaches, blurred vision or lower extremity edema.  She is feeling good fetal movement.  She has had a  with her first pregnancy for failure of dilation, and more recently a successful .  She would like a TOLAC again, consult placed today.  Gave information in regard to checking with her insurance about a breast pump.  Flu shot given today.  No vaginal bleeding, no contractions, no leakage of fluid  No vaginal discharge. No dysuria.   No headache, vision changes, lower extremity swelling, upper abdominal pain, chest pain, shortness of breath  Discussed kick counts and fetal movement.  Reportable signs and symptoms discussed.  Tdap planned next visit  RTC 3 weeks.       Felicia Umana MD

## 2021-09-15 ENCOUNTER — TELEPHONE (OUTPATIENT)
Dept: FAMILY MEDICINE | Facility: CLINIC | Age: 26
End: 2021-09-15

## 2021-09-15 LAB — T PALLIDUM AB SER QL: NEGATIVE

## 2021-09-15 NOTE — TELEPHONE ENCOUNTER
LMTCB    Please help pt schedule a lab appointment for 3 hour sugar test. Test should be done in the morning since she needs to fast for 8 hours prior

## 2021-09-21 ENCOUNTER — TRANSFERRED RECORDS (OUTPATIENT)
Dept: HEALTH INFORMATION MANAGEMENT | Facility: CLINIC | Age: 26
End: 2021-09-21

## 2021-09-23 ENCOUNTER — LAB (OUTPATIENT)
Dept: LAB | Facility: CLINIC | Age: 26
End: 2021-09-23
Payer: MEDICAID

## 2021-09-23 DIAGNOSIS — O09.92 SUPERVISION OF HIGH RISK PREGNANCY IN SECOND TRIMESTER: ICD-10-CM

## 2021-09-23 DIAGNOSIS — R73.09 ABNORMAL GLUCOSE TOLERANCE TEST: ICD-10-CM

## 2021-09-23 LAB
GESTATIONAL GTT 1 HR POST DOSE: 135 MG/DL (ref 60–179)
GESTATIONAL GTT 2 HR POST DOSE: 117 MG/DL (ref 60–154)
GESTATIONAL GTT 3 HR POST DOSE: 93 MG/DL (ref 60–139)
GLUCOSE P FAST SERPL-MCNC: 70 MG/DL (ref 60–94)

## 2021-09-23 PROCEDURE — 82952 GTT-ADDED SAMPLES: CPT

## 2021-09-23 PROCEDURE — 82951 GLUCOSE TOLERANCE TEST (GTT): CPT

## 2021-09-23 PROCEDURE — 36415 COLL VENOUS BLD VENIPUNCTURE: CPT

## 2021-09-27 ENCOUNTER — IMMUNIZATION (OUTPATIENT)
Dept: NURSING | Facility: CLINIC | Age: 26
End: 2021-09-27
Payer: MEDICAID

## 2021-09-27 PROCEDURE — 91300 PR COVID VAC PFIZER DIL RECON 30 MCG/0.3 ML IM: CPT

## 2021-09-27 PROCEDURE — 0001A PR COVID VAC PFIZER DIL RECON 30 MCG/0.3 ML IM: CPT

## 2021-10-05 ENCOUNTER — PRENATAL OFFICE VISIT (OUTPATIENT)
Dept: FAMILY MEDICINE | Facility: CLINIC | Age: 26
End: 2021-10-05
Payer: MEDICAID

## 2021-10-05 ENCOUNTER — MEDICAL CORRESPONDENCE (OUTPATIENT)
Dept: HEALTH INFORMATION MANAGEMENT | Facility: CLINIC | Age: 26
End: 2021-10-05

## 2021-10-05 VITALS
BODY MASS INDEX: 27.32 KG/M2 | WEIGHT: 139.13 LBS | HEART RATE: 102 BPM | SYSTOLIC BLOOD PRESSURE: 120 MMHG | HEIGHT: 60 IN | DIASTOLIC BLOOD PRESSURE: 71 MMHG

## 2021-10-05 DIAGNOSIS — O09.93 SUPERVISION OF HIGH RISK PREGNANCY IN THIRD TRIMESTER: Primary | ICD-10-CM

## 2021-10-05 DIAGNOSIS — O34.219 PREVIOUS CESAREAN DELIVERY, ANTEPARTUM CONDITION OR COMPLICATION: ICD-10-CM

## 2021-10-05 PROCEDURE — 99207 PR PRENATAL VISIT: CPT | Performed by: FAMILY MEDICINE

## 2021-10-05 PROCEDURE — 90471 IMMUNIZATION ADMIN: CPT | Performed by: FAMILY MEDICINE

## 2021-10-05 PROCEDURE — 90715 TDAP VACCINE 7 YRS/> IM: CPT | Performed by: FAMILY MEDICINE

## 2021-10-05 ASSESSMENT — MIFFLIN-ST. JEOR: SCORE: 1282.81

## 2021-10-05 NOTE — PROGRESS NOTES
Patient reports that she has been feeling well.  She had her  consult with [facility] since her last visit.  This occurred on 2021.  We will obtain a note from that visit and scan this into her chart.  Her only question today is that of fetal hiccups.  She thinks these happen 1-2 times daily, last less than 5 minutes per session.  This is more frequent than her previous pregnancies.  Discussed that frequent or prolonged fetal hiccups can be a sign of fetal distress.  If these last longer than 15 to 20 minutes, are happening multiple times daily, asked patient to let me know and could consider biophysical profile at that time.  Discussed kick counting in detail today.  Tdap vaccine given.  She denies contractions, leakage of fluid or bleeding.  She also denies right upper quadrant pain, headaches or blurred vision.  She has no lower extremity edema.  Concerns: See above  No vaginal bleeding, LOF.  No contractions.  Discussed kick counts and fetal movement.  Reportable signs and symptoms discussed.  Discussed PTL, PROM, and when to call or come in.  RTC 2 weeks.    Felicia Umana MD

## 2021-10-16 ENCOUNTER — HEALTH MAINTENANCE LETTER (OUTPATIENT)
Age: 26
End: 2021-10-16

## 2021-10-18 ENCOUNTER — IMMUNIZATION (OUTPATIENT)
Dept: NURSING | Facility: CLINIC | Age: 26
End: 2021-10-18
Attending: FAMILY MEDICINE
Payer: MEDICAID

## 2021-10-18 PROCEDURE — 0002A PR COVID VAC PFIZER DIL RECON 30 MCG/0.3 ML IM: CPT

## 2021-10-18 PROCEDURE — 91300 PR COVID VAC PFIZER DIL RECON 30 MCG/0.3 ML IM: CPT

## 2021-10-19 ENCOUNTER — PRENATAL OFFICE VISIT (OUTPATIENT)
Dept: FAMILY MEDICINE | Facility: CLINIC | Age: 26
End: 2021-10-19
Payer: MEDICAID

## 2021-10-19 VITALS
DIASTOLIC BLOOD PRESSURE: 77 MMHG | SYSTOLIC BLOOD PRESSURE: 112 MMHG | WEIGHT: 140.25 LBS | BODY MASS INDEX: 27.67 KG/M2

## 2021-10-19 DIAGNOSIS — O09.92 SUPERVISION OF HIGH RISK PREGNANCY IN SECOND TRIMESTER: Primary | ICD-10-CM

## 2021-10-19 DIAGNOSIS — O34.219 PREVIOUS CESAREAN DELIVERY, ANTEPARTUM CONDITION OR COMPLICATION: ICD-10-CM

## 2021-10-19 PROCEDURE — 99207 PR PRENATAL VISIT: CPT | Performed by: FAMILY MEDICINE

## 2021-10-23 NOTE — PROGRESS NOTES
Patient continues to feel well, but getting a bit uncomfortable at night and finding it harder to get comfortable to sleep.  She reports good fetal movement, denies leakage of fluid or bleeding, denies contractions.  No RUQ pain, lower extremity edema, headaches or blurred vision.  Plan discussion of delivery preferences next visit.  Concerns: None  Doing well.  No concerns today.  No vaginal bleeding, LOF.  No contractions.  Discussed kick counts and fetal movement.  Reportable signs and symptoms discussed.  Discussed PTL, PROM, and when to call or come in.  RTC 2 weeks.    Felicia Umana MD

## 2021-11-02 ENCOUNTER — PRENATAL OFFICE VISIT (OUTPATIENT)
Dept: FAMILY MEDICINE | Facility: CLINIC | Age: 26
End: 2021-11-02
Payer: MEDICAID

## 2021-11-02 VITALS
DIASTOLIC BLOOD PRESSURE: 73 MMHG | HEIGHT: 60 IN | WEIGHT: 140 LBS | HEART RATE: 80 BPM | SYSTOLIC BLOOD PRESSURE: 117 MMHG | BODY MASS INDEX: 27.48 KG/M2

## 2021-11-02 DIAGNOSIS — O34.219 PREVIOUS CESAREAN DELIVERY, ANTEPARTUM CONDITION OR COMPLICATION: ICD-10-CM

## 2021-11-02 DIAGNOSIS — O09.92 SUPERVISION OF HIGH RISK PREGNANCY IN SECOND TRIMESTER: Primary | ICD-10-CM

## 2021-11-02 PROCEDURE — 99207 PR PRENATAL VISIT: CPT | Performed by: FAMILY MEDICINE

## 2021-11-02 ASSESSMENT — MIFFLIN-ST. JEOR: SCORE: 1286.78

## 2021-11-07 NOTE — PROGRESS NOTES
Patient continues to feel well.  She denies leakage of fluid or bleeding, denies contractions.  She has had no lower extremity edema.  She denies headaches or blurred vision.  She has had good fetal movement.  Measurement is unchanged from last visit, likely due to fetal position.  If she continues to measure small next visit, will consider growth ultrasound.  Concerns: None  Doing well.  No concerns today.  No vaginal bleeding, LOF.  No contractions.  Cervix check next visit.  Vertex position confirmed by Leopold maneuvers.  Discussed kick counts and fetal movement.  Reportable signs and symptoms discussed.  RTC 2 weeks.    Felicia Umana MD

## 2021-11-16 ENCOUNTER — PRENATAL OFFICE VISIT (OUTPATIENT)
Dept: FAMILY MEDICINE | Facility: CLINIC | Age: 26
End: 2021-11-16
Payer: MEDICAID

## 2021-11-16 VITALS
SYSTOLIC BLOOD PRESSURE: 121 MMHG | BODY MASS INDEX: 27.81 KG/M2 | HEART RATE: 101 BPM | DIASTOLIC BLOOD PRESSURE: 82 MMHG | WEIGHT: 141 LBS

## 2021-11-16 DIAGNOSIS — O36.5930 POOR FETAL GROWTH AFFECTING MANAGEMENT OF MOTHER IN THIRD TRIMESTER, SINGLE OR UNSPECIFIED FETUS: ICD-10-CM

## 2021-11-16 DIAGNOSIS — O34.219 PREVIOUS CESAREAN DELIVERY, ANTEPARTUM CONDITION OR COMPLICATION: ICD-10-CM

## 2021-11-16 DIAGNOSIS — O09.93 SUPERVISION OF HIGH RISK PREGNANCY IN THIRD TRIMESTER: Primary | ICD-10-CM

## 2021-11-16 LAB
ALBUMIN UR-MCNC: NEGATIVE MG/DL
APPEARANCE UR: CLEAR
BILIRUB UR QL STRIP: NEGATIVE
COLOR UR AUTO: YELLOW
GLUCOSE UR STRIP-MCNC: NEGATIVE MG/DL
HGB BLD-MCNC: 13.6 G/DL (ref 11.7–15.7)
HGB UR QL STRIP: NEGATIVE
KETONES UR STRIP-MCNC: NEGATIVE MG/DL
LEUKOCYTE ESTERASE UR QL STRIP: ABNORMAL
NITRATE UR QL: NEGATIVE
PH UR STRIP: 7 [PH] (ref 5–8)
SP GR UR STRIP: 1.01 (ref 1–1.03)
UROBILINOGEN UR STRIP-ACNC: 0.2 E.U./DL

## 2021-11-16 PROCEDURE — 87653 STREP B DNA AMP PROBE: CPT | Performed by: FAMILY MEDICINE

## 2021-11-16 PROCEDURE — 36415 COLL VENOUS BLD VENIPUNCTURE: CPT | Performed by: FAMILY MEDICINE

## 2021-11-16 PROCEDURE — 99207 PR PRENATAL VISIT: CPT | Performed by: FAMILY MEDICINE

## 2021-11-16 PROCEDURE — 81003 URINALYSIS AUTO W/O SCOPE: CPT | Performed by: FAMILY MEDICINE

## 2021-11-16 NOTE — PROGRESS NOTES
Patient has been feeling well with the exception of 1 hour of contractions last night.  These resolved with position change and hydration.  She denies leakage of fluid or bleeding, reports good fetal movement, denies LE edema, RUQ pain, headaches or blurred vision.  Due to cervical dilation and effacement, asked patient to take it easy and pursue pelvic rest until at least 37 weeks.  Growth ultrasound ordered today as she continues to measure small.  Concerns: Contractions as above.  No HA, RUQ pain, N/V, visual changes.  Cervix is 3/80%/-2.  Cephalic position confirmed by Leopold maneuvers and cervical exam.  Discussed signs of labor and when to call or come in.  Discussed kick counts and fetal movement.  Reportable signs and symptoms discussed.  GBS done today.  Labor precautions discussed.  RTC 1 week.  Prenatal flowsheet information is reviewed.    Felicia Umana MD

## 2021-11-16 NOTE — PATIENT INSTRUCTIONS
You can call [phone] to schedule your ultrasound at your convenience.    The number to call for maternity if you feel that you are in labor is [phone].

## 2021-11-17 ENCOUNTER — HOSPITAL ENCOUNTER (OUTPATIENT)
Dept: ULTRASOUND IMAGING | Facility: CLINIC | Age: 26
Discharge: HOME | End: 2021-11-17
Attending: FAMILY MEDICINE | Admitting: FAMILY MEDICINE
Payer: MEDICAID

## 2021-11-17 DIAGNOSIS — O09.93 SUPERVISION OF HIGH RISK PREGNANCY IN THIRD TRIMESTER: ICD-10-CM

## 2021-11-17 DIAGNOSIS — O34.219 PREVIOUS CESAREAN DELIVERY, ANTEPARTUM CONDITION OR COMPLICATION: ICD-10-CM

## 2021-11-17 DIAGNOSIS — O36.5930 POOR FETAL GROWTH AFFECTING MANAGEMENT OF MOTHER IN THIRD TRIMESTER, SINGLE OR UNSPECIFIED FETUS: ICD-10-CM

## 2021-11-17 PROCEDURE — 76816 OB US FOLLOW-UP PER FETUS: CPT

## 2021-11-18 LAB
GP B STREP DNA SPEC QL NAA+PROBE: NEGATIVE
PATIENT PENICILLIN, AMOXICILLIN, CEPHALOSPORINS ALLERGY: NO

## 2021-11-23 ENCOUNTER — PRENATAL OFFICE VISIT (OUTPATIENT)
Dept: FAMILY MEDICINE | Facility: CLINIC | Age: 26
End: 2021-11-23
Payer: MEDICAID

## 2021-11-23 VITALS
SYSTOLIC BLOOD PRESSURE: 116 MMHG | WEIGHT: 141.38 LBS | BODY MASS INDEX: 27.89 KG/M2 | DIASTOLIC BLOOD PRESSURE: 69 MMHG | HEART RATE: 98 BPM

## 2021-11-23 DIAGNOSIS — O34.219 PREVIOUS CESAREAN DELIVERY, ANTEPARTUM CONDITION OR COMPLICATION: ICD-10-CM

## 2021-11-23 DIAGNOSIS — O09.93 SUPERVISION OF HIGH RISK PREGNANCY IN THIRD TRIMESTER: Primary | ICD-10-CM

## 2021-11-23 LAB
ALBUMIN UR-MCNC: NEGATIVE MG/DL
APPEARANCE UR: CLEAR
BILIRUB UR QL STRIP: NEGATIVE
COLOR UR AUTO: YELLOW
GLUCOSE UR STRIP-MCNC: NEGATIVE MG/DL
HGB UR QL STRIP: NEGATIVE
KETONES UR STRIP-MCNC: NEGATIVE MG/DL
LEUKOCYTE ESTERASE UR QL STRIP: ABNORMAL
NITRATE UR QL: NEGATIVE
PH UR STRIP: 7 [PH] (ref 5–8)
SP GR UR STRIP: 1.02 (ref 1–1.03)
UROBILINOGEN UR STRIP-ACNC: 0.2 E.U./DL

## 2021-11-23 PROCEDURE — 81003 URINALYSIS AUTO W/O SCOPE: CPT | Performed by: FAMILY MEDICINE

## 2021-11-23 PROCEDURE — 99207 PR PRENATAL VISIT: CPT | Performed by: FAMILY MEDICINE

## 2021-11-25 NOTE — PROGRESS NOTES
Patient reports that she has been feeling well.  She has had a couple of contractions 3 days ago, a couple more this morning.  These are painful, but not repetitive.  She denies leakage of fluid or bleeding, RUQ pain, headaches or blurred vision.  Discussed today her cervical dilation and effacement, and recommended holding off on cervical exam this visit.  Will defer to next visit.  Urine is normal today.  Reviewed again who and when to call if she feels she is in labor.  Concerns: None  Doing well.  No concerns today.  No HA, RUQ pain, N/V, visual changes.  Cervix check next visit.  Vertex position confirmed by Leopold maneuvers.  Discussed signs of labor and when to call or come in.  Discussed kick counts and fetal movement.  Reportable signs and symptoms discussed.  Labor precautions discussed.  RTC 1 week.    Felicia Umana MD

## 2021-11-30 ENCOUNTER — PRENATAL OFFICE VISIT (OUTPATIENT)
Dept: FAMILY MEDICINE | Facility: CLINIC | Age: 26
End: 2021-11-30
Payer: MEDICAID

## 2021-11-30 VITALS
BODY MASS INDEX: 28.09 KG/M2 | WEIGHT: 142.38 LBS | HEART RATE: 84 BPM | DIASTOLIC BLOOD PRESSURE: 79 MMHG | SYSTOLIC BLOOD PRESSURE: 119 MMHG

## 2021-11-30 DIAGNOSIS — O34.219 PREVIOUS CESAREAN DELIVERY, ANTEPARTUM CONDITION OR COMPLICATION: ICD-10-CM

## 2021-11-30 DIAGNOSIS — R21 RASH AND NONSPECIFIC SKIN ERUPTION: ICD-10-CM

## 2021-11-30 DIAGNOSIS — O09.93 SUPERVISION OF HIGH RISK PREGNANCY IN THIRD TRIMESTER: Primary | ICD-10-CM

## 2021-11-30 LAB
ALBUMIN UR-MCNC: NEGATIVE MG/DL
APPEARANCE UR: CLEAR
BACTERIA #/AREA URNS HPF: ABNORMAL /HPF
BILIRUB UR QL STRIP: NEGATIVE
COLOR UR AUTO: YELLOW
GLUCOSE UR STRIP-MCNC: NEGATIVE MG/DL
HGB UR QL STRIP: NEGATIVE
KETONES UR STRIP-MCNC: NEGATIVE MG/DL
LEUKOCYTE ESTERASE UR QL STRIP: ABNORMAL
NITRATE UR QL: NEGATIVE
PH UR STRIP: 7 [PH] (ref 5–8)
RBC #/AREA URNS AUTO: ABNORMAL /HPF
SP GR UR STRIP: 1.01 (ref 1–1.03)
SQUAMOUS #/AREA URNS AUTO: ABNORMAL /LPF
UROBILINOGEN UR STRIP-ACNC: 0.2 E.U./DL
WBC #/AREA URNS AUTO: ABNORMAL /HPF

## 2021-11-30 PROCEDURE — 99207 PR PRENATAL VISIT: CPT | Performed by: FAMILY MEDICINE

## 2021-11-30 PROCEDURE — 87086 URINE CULTURE/COLONY COUNT: CPT | Performed by: FAMILY MEDICINE

## 2021-11-30 PROCEDURE — 81001 URINALYSIS AUTO W/SCOPE: CPT | Performed by: FAMILY MEDICINE

## 2021-12-01 PROBLEM — R21 RASH AND NONSPECIFIC SKIN ERUPTION: Status: ACTIVE | Noted: 2021-12-01

## 2021-12-01 NOTE — PROGRESS NOTES
Patient reports that overall she has been feeling well.  She did have some contractions here and there since her last visit, no leakage of fluid or bleeding, nothing becoming regular or more intense.  She is ready to have this baby.  She has noted the appearance of a rash just above her umbilicus that can be itchy.  She has not had any new exposures to soaps, lotions, detergents, etc.  This does not appear to start within her stretch marks, but PUPPP is still a possibility.  Discussed application of topical hydrocortisone to start with.  Initially we had a discussion about elective induction, but with patient's history of  section, recommended against this.  We will plan to sweep membranes at next visit.  Cervix is unchanged from last visit.  Plan follow-up in 1 week if she remains undelivered.  Concerns: Rash on abdomen.  No HA, RUQ pain, N/V, visual changes.  Cervix is unchanged from previous exam and 3/80%/-2.  Vertex position confirmed by cervical exam.  Discussed signs of labor and when to call or come in.  Discussed kick counts and fetal movement.  Reportable signs and symptoms discussed.  Labor precautions discussed.  RTC 1 week.    Felicia Umana MD

## 2021-12-02 LAB — BACTERIA UR CULT: NO GROWTH

## 2021-12-03 ENCOUNTER — HOSPITAL ENCOUNTER (OUTPATIENT)
Facility: HOSPITAL | Age: 26
Discharge: HOME | End: 2021-12-03
Attending: FAMILY MEDICINE | Admitting: FAMILY MEDICINE
Payer: MEDICAID

## 2021-12-03 PROCEDURE — G0463 HOSPITAL OUTPT CLINIC VISIT: HCPCS

## 2021-12-03 NOTE — PROGRESS NOTES
Patient arrived to [facility] with complaints of contactions every 5 minutes for 3 hours. States when she got her the pain has gotten better. Patient checked and SVE was unchanged from clinic. POC was discussed with patient and Dr. Umana. Will recheck cervix in one hour and make plan then.  Kayla Sky RN  12/3/2021 3:12 PM    Cervical exam unchanged. Dr. Umana updated and ok to discharge home.  Kayla Sky RN  12/3/2021 3:37 PM

## 2021-12-07 ENCOUNTER — PRENATAL OFFICE VISIT (OUTPATIENT)
Dept: FAMILY MEDICINE | Facility: CLINIC | Age: 26
End: 2021-12-07
Payer: MEDICAID

## 2021-12-07 VITALS
HEART RATE: 94 BPM | DIASTOLIC BLOOD PRESSURE: 72 MMHG | BODY MASS INDEX: 28.66 KG/M2 | SYSTOLIC BLOOD PRESSURE: 114 MMHG | WEIGHT: 146 LBS | HEIGHT: 60 IN

## 2021-12-07 DIAGNOSIS — O34.219 PREVIOUS CESAREAN DELIVERY, ANTEPARTUM CONDITION OR COMPLICATION: ICD-10-CM

## 2021-12-07 DIAGNOSIS — O09.93 SUPERVISION OF HIGH RISK PREGNANCY IN THIRD TRIMESTER: Primary | ICD-10-CM

## 2021-12-07 LAB
ALBUMIN UR-MCNC: NEGATIVE MG/DL
APPEARANCE UR: CLEAR
BILIRUB UR QL STRIP: NEGATIVE
COLOR UR AUTO: YELLOW
GLUCOSE UR STRIP-MCNC: NEGATIVE MG/DL
HGB UR QL STRIP: NEGATIVE
KETONES UR STRIP-MCNC: NEGATIVE MG/DL
LEUKOCYTE ESTERASE UR QL STRIP: ABNORMAL
NITRATE UR QL: NEGATIVE
PH UR STRIP: 7 [PH] (ref 5–8)
SP GR UR STRIP: 1.01 (ref 1–1.03)
UROBILINOGEN UR STRIP-ACNC: 0.2 E.U./DL

## 2021-12-07 PROCEDURE — 81003 URINALYSIS AUTO W/O SCOPE: CPT | Performed by: FAMILY MEDICINE

## 2021-12-07 PROCEDURE — 59426 ANTEPARTUM CARE ONLY: CPT | Performed by: FAMILY MEDICINE

## 2021-12-07 ASSESSMENT — MIFFLIN-ST. JEOR: SCORE: 1308.99

## 2021-12-07 NOTE — LETTER
[facility]  [address] [facility] [address]  [phone]  Dept: [phone]      12/7/2021    Re: Ladonna Casiano      TO WHOM IT MAY CONCERN:    Ladonna Casiano  was seen on 12/7/2021.  I have recommended she remain out of work for the remainder of her pregnancy to minimize her risk of exposure to Covid.    Cordially          MD [facility]

## 2021-12-07 NOTE — PROGRESS NOTES
Patient reports that overall she feels well.  She has been rachel intermittently, but these end up dying out.  She denies any leakage of fluid or bleeding.  She reports good fetal movement, denies lower extremity edema, right upper quadrant pain, headaches or blurred vision.  She knows who and when to call if she feels she is in labor.  She has developed some nasal congestion and runny nose over the past 3 days.  She had a Covid test yesterday that was negative.  Her  is asymptomatic.  Concerns: None  No HA, RUQ pain, N/V, visual changes.  Cervix is 4/80%/-1, soft, mid position.  Vertex position confirmed by cervical exam.  Discussed signs of labor and when to call or come in.  Discussed kick counts and fetal movement.  Reportable signs and symptoms discussed.  Labor precautions discussed.  RTC 1 week.    Felicia Umana MD

## 2021-12-09 ENCOUNTER — HOSPITAL ENCOUNTER (OUTPATIENT)
Dept: ULTRASOUND IMAGING | Facility: CLINIC | Age: 26
Discharge: HOME | End: 2021-12-09
Attending: FAMILY MEDICINE | Admitting: FAMILY MEDICINE
Payer: MEDICAID

## 2021-12-09 ENCOUNTER — HOSPITAL ENCOUNTER (OUTPATIENT)
Facility: CLINIC | Age: 26
Discharge: HOME | End: 2021-12-09
Attending: FAMILY MEDICINE | Admitting: FAMILY MEDICINE
Payer: MEDICAID

## 2021-12-09 DIAGNOSIS — O09.93 SUPERVISION OF HIGH RISK PREGNANCY IN THIRD TRIMESTER: ICD-10-CM

## 2021-12-09 DIAGNOSIS — O09.93 SUPERVISION OF HIGH RISK PREGNANCY IN THIRD TRIMESTER: Primary | ICD-10-CM

## 2021-12-09 DIAGNOSIS — O34.219 PREVIOUS CESAREAN DELIVERY, ANTEPARTUM CONDITION OR COMPLICATION: ICD-10-CM

## 2021-12-09 PROBLEM — Z36.89 ENCOUNTER FOR TRIAGE IN PREGNANT PATIENT: Status: ACTIVE | Noted: 2021-12-09

## 2021-12-09 PROCEDURE — 76816 OB US FOLLOW-UP PER FETUS: CPT

## 2021-12-09 PROCEDURE — 76819 FETAL BIOPHYS PROFIL W/O NST: CPT

## 2021-12-09 NOTE — DISCHARGE INSTRUCTIONS
Discharge Instruction for Undelivered Patients      You were seen for: Labor Assessment  We Consulted: Dr Umana  You had (Test or Medicine):NST     Diet:   Drink 8 to 12 glasses of liquids (milk, juice, water) every day.     Activity:  Call your doctor or nurse midwife if your baby is moving less than usual.     Call your provider if you notice:  Swelling in your face or increased swelling in your hands or legs.  Headaches that are not relieved by Tylenol (acetaminophen).  Changes in your vision (blurring: seeing spots or stars.)  Nausea (sick to your stomach) and vomiting (throwing up).   Weight gain of 5 pounds or more per week.  Heartburn that doesn't go away.  Signs of bladder infection: pain when you urinate (use the toilet), need to go more often and more urgently.  The bag of villalobos (rupture of membranes) breaks, or you notice leaking in your underwear.  Bright red blood in your underwear.  Abdominal (lower belly) or stomach pain.  For first baby: Contractions (tightening) less than 5 minutes apart for one hour or more.  Second (plus) baby: Contractions (tightening) less than 10 minutes apart and getting stronger.  *If less than 34 weeks: Contractions (tightening) more than 6 times in one hour.  Increase or change in vaginal discharge (note the color and amount)  Other:     Follow-up:  As scheduled in the clinic

## 2021-12-11 ENCOUNTER — HOSPITAL ENCOUNTER (INPATIENT)
Facility: HOSPITAL | Age: 26
LOS: 1 days | Discharge: HOME HEALTH | End: 2021-12-12
Attending: FAMILY MEDICINE | Admitting: FAMILY MEDICINE
Payer: MEDICAID

## 2021-12-11 PROBLEM — Z34.90 PREGNANT: Status: ACTIVE | Noted: 2021-12-11

## 2021-12-11 LAB
ABO/RH(D): NORMAL
ANTIBODY SCREEN: NEGATIVE
HGB BLD-MCNC: 14 G/DL (ref 11.7–15.7)
PLATELET # BLD AUTO: 298 10E3/UL (ref 150–450)
SARS-COV-2 RNA RESP QL NAA+PROBE: NEGATIVE
SPECIMEN EXPIRATION DATE: NORMAL

## 2021-12-11 PROCEDURE — 59414 DELIVER PLACENTA: CPT | Performed by: FAMILY MEDICINE

## 2021-12-11 PROCEDURE — 722N000001 HC LABOR CARE VAGINAL DELIVERY SINGLE

## 2021-12-11 PROCEDURE — 86780 TREPONEMA PALLIDUM: CPT | Performed by: FAMILY MEDICINE

## 2021-12-11 PROCEDURE — 36415 COLL VENOUS BLD VENIPUNCTURE: CPT | Performed by: FAMILY MEDICINE

## 2021-12-11 PROCEDURE — 87635 SARS-COV-2 COVID-19 AMP PRB: CPT | Performed by: FAMILY MEDICINE

## 2021-12-11 PROCEDURE — 250N000009 HC RX 250: Performed by: FAMILY MEDICINE

## 2021-12-11 PROCEDURE — 85049 AUTOMATED PLATELET COUNT: CPT | Performed by: FAMILY MEDICINE

## 2021-12-11 PROCEDURE — 120N000001 HC R&B MED SURG/OB

## 2021-12-11 PROCEDURE — 86901 BLOOD TYPING SEROLOGIC RH(D): CPT | Performed by: FAMILY MEDICINE

## 2021-12-11 PROCEDURE — 250N000013 HC RX MED GY IP 250 OP 250 PS 637: Performed by: FAMILY MEDICINE

## 2021-12-11 PROCEDURE — 250N000011 HC RX IP 250 OP 636: Performed by: FAMILY MEDICINE

## 2021-12-11 PROCEDURE — 85018 HEMOGLOBIN: CPT | Performed by: FAMILY MEDICINE

## 2021-12-11 RX ORDER — NALOXONE HYDROCHLORIDE 0.4 MG/ML
0.2 INJECTION, SOLUTION INTRAMUSCULAR; INTRAVENOUS; SUBCUTANEOUS
Status: DISCONTINUED | OUTPATIENT
Start: 2021-12-11 | End: 2021-12-11 | Stop reason: HOSPADM

## 2021-12-11 RX ORDER — OXYTOCIN 10 [USP'U]/ML
10 INJECTION, SOLUTION INTRAMUSCULAR; INTRAVENOUS
Status: DISCONTINUED | OUTPATIENT
Start: 2021-12-11 | End: 2021-12-11 | Stop reason: HOSPADM

## 2021-12-11 RX ORDER — OXYTOCIN/0.9 % SODIUM CHLORIDE 30/500 ML
100-340 PLASTIC BAG, INJECTION (ML) INTRAVENOUS CONTINUOUS PRN
Status: DISCONTINUED | OUTPATIENT
Start: 2021-12-11 | End: 2021-12-13 | Stop reason: HOSPADM

## 2021-12-11 RX ORDER — MISOPROSTOL 200 UG/1
400 TABLET ORAL
Status: DISCONTINUED | OUTPATIENT
Start: 2021-12-11 | End: 2021-12-11 | Stop reason: HOSPADM

## 2021-12-11 RX ORDER — HYDROCORTISONE 2.5 %
CREAM (GRAM) TOPICAL 3 TIMES DAILY PRN
Status: DISCONTINUED | OUTPATIENT
Start: 2021-12-11 | End: 2021-12-13 | Stop reason: HOSPADM

## 2021-12-11 RX ORDER — METOCLOPRAMIDE HYDROCHLORIDE 5 MG/ML
10 INJECTION INTRAMUSCULAR; INTRAVENOUS EVERY 6 HOURS PRN
Status: DISCONTINUED | OUTPATIENT
Start: 2021-12-11 | End: 2021-12-11 | Stop reason: HOSPADM

## 2021-12-11 RX ORDER — MODIFIED LANOLIN
OINTMENT (GRAM) TOPICAL
Status: DISCONTINUED | OUTPATIENT
Start: 2021-12-11 | End: 2021-12-13 | Stop reason: HOSPADM

## 2021-12-11 RX ORDER — PROCHLORPERAZINE MALEATE 10 MG
10 TABLET ORAL EVERY 6 HOURS PRN
Status: DISCONTINUED | OUTPATIENT
Start: 2021-12-11 | End: 2021-12-11 | Stop reason: HOSPADM

## 2021-12-11 RX ORDER — MISOPROSTOL 200 UG/1
800 TABLET ORAL
Status: DISCONTINUED | OUTPATIENT
Start: 2021-12-11 | End: 2021-12-11 | Stop reason: HOSPADM

## 2021-12-11 RX ORDER — OXYTOCIN/0.9 % SODIUM CHLORIDE 30/500 ML
340 PLASTIC BAG, INJECTION (ML) INTRAVENOUS CONTINUOUS PRN
Status: COMPLETED | OUTPATIENT
Start: 2021-12-11 | End: 2021-12-11

## 2021-12-11 RX ORDER — NALOXONE HYDROCHLORIDE 0.4 MG/ML
0.4 INJECTION, SOLUTION INTRAMUSCULAR; INTRAVENOUS; SUBCUTANEOUS
Status: DISCONTINUED | OUTPATIENT
Start: 2021-12-11 | End: 2021-12-11 | Stop reason: HOSPADM

## 2021-12-11 RX ORDER — ONDANSETRON 4 MG/1
4 TABLET, ORALLY DISINTEGRATING ORAL EVERY 6 HOURS PRN
Status: DISCONTINUED | OUTPATIENT
Start: 2021-12-11 | End: 2021-12-11 | Stop reason: HOSPADM

## 2021-12-11 RX ORDER — BISACODYL 10 MG
10 SUPPOSITORY, RECTAL RECTAL DAILY PRN
Status: DISCONTINUED | OUTPATIENT
Start: 2021-12-11 | End: 2021-12-13 | Stop reason: HOSPADM

## 2021-12-11 RX ORDER — CARBOPROST TROMETHAMINE 250 UG/ML
250 INJECTION, SOLUTION INTRAMUSCULAR
Status: DISCONTINUED | OUTPATIENT
Start: 2021-12-11 | End: 2021-12-13 | Stop reason: HOSPADM

## 2021-12-11 RX ORDER — ONDANSETRON 2 MG/ML
4 INJECTION INTRAMUSCULAR; INTRAVENOUS EVERY 6 HOURS PRN
Status: DISCONTINUED | OUTPATIENT
Start: 2021-12-11 | End: 2021-12-11 | Stop reason: HOSPADM

## 2021-12-11 RX ORDER — PROCHLORPERAZINE 25 MG
25 SUPPOSITORY, RECTAL RECTAL EVERY 12 HOURS PRN
Status: DISCONTINUED | OUTPATIENT
Start: 2021-12-11 | End: 2021-12-11 | Stop reason: HOSPADM

## 2021-12-11 RX ORDER — KETOROLAC TROMETHAMINE 30 MG/ML
30 INJECTION, SOLUTION INTRAMUSCULAR; INTRAVENOUS
Status: DISCONTINUED | OUTPATIENT
Start: 2021-12-11 | End: 2021-12-13 | Stop reason: HOSPADM

## 2021-12-11 RX ORDER — METHYLERGONOVINE MALEATE 0.2 MG/ML
200 INJECTION INTRAVENOUS
Status: DISCONTINUED | OUTPATIENT
Start: 2021-12-11 | End: 2021-12-11 | Stop reason: HOSPADM

## 2021-12-11 RX ORDER — MISOPROSTOL 200 UG/1
800 TABLET ORAL
Status: DISCONTINUED | OUTPATIENT
Start: 2021-12-11 | End: 2021-12-13 | Stop reason: HOSPADM

## 2021-12-11 RX ORDER — MINERAL OIL
OIL (ML) MISCELLANEOUS
Status: DISCONTINUED | OUTPATIENT
Start: 2021-12-11 | End: 2021-12-13 | Stop reason: HOSPADM

## 2021-12-11 RX ORDER — OXYTOCIN 10 [USP'U]/ML
10 INJECTION, SOLUTION INTRAMUSCULAR; INTRAVENOUS
Status: DISCONTINUED | OUTPATIENT
Start: 2021-12-11 | End: 2021-12-13 | Stop reason: HOSPADM

## 2021-12-11 RX ORDER — IBUPROFEN 600 MG/1
600 TABLET, FILM COATED ORAL
Status: DISCONTINUED | OUTPATIENT
Start: 2021-12-11 | End: 2021-12-13 | Stop reason: HOSPADM

## 2021-12-11 RX ORDER — METOCLOPRAMIDE 10 MG/1
10 TABLET ORAL EVERY 6 HOURS PRN
Status: DISCONTINUED | OUTPATIENT
Start: 2021-12-11 | End: 2021-12-11 | Stop reason: HOSPADM

## 2021-12-11 RX ORDER — DOCUSATE SODIUM 100 MG/1
100 CAPSULE, LIQUID FILLED ORAL DAILY
Status: DISCONTINUED | OUTPATIENT
Start: 2021-12-12 | End: 2021-12-13 | Stop reason: HOSPADM

## 2021-12-11 RX ORDER — LIDOCAINE 40 MG/G
CREAM TOPICAL
Status: DISCONTINUED | OUTPATIENT
Start: 2021-12-11 | End: 2021-12-11 | Stop reason: HOSPADM

## 2021-12-11 RX ORDER — ACETAMINOPHEN 325 MG/1
650 TABLET ORAL EVERY 4 HOURS PRN
Status: DISCONTINUED | OUTPATIENT
Start: 2021-12-11 | End: 2021-12-13 | Stop reason: HOSPADM

## 2021-12-11 RX ORDER — METHYLERGONOVINE MALEATE 0.2 MG/ML
200 INJECTION INTRAVENOUS
Status: DISCONTINUED | OUTPATIENT
Start: 2021-12-11 | End: 2021-12-13 | Stop reason: HOSPADM

## 2021-12-11 RX ORDER — MISOPROSTOL 200 UG/1
400 TABLET ORAL
Status: DISCONTINUED | OUTPATIENT
Start: 2021-12-11 | End: 2021-12-13 | Stop reason: HOSPADM

## 2021-12-11 RX ORDER — IBUPROFEN 800 MG/1
800 TABLET, FILM COATED ORAL EVERY 6 HOURS PRN
Status: DISCONTINUED | OUTPATIENT
Start: 2021-12-11 | End: 2021-12-13 | Stop reason: HOSPADM

## 2021-12-11 RX ORDER — OXYTOCIN/0.9 % SODIUM CHLORIDE 30/500 ML
340 PLASTIC BAG, INJECTION (ML) INTRAVENOUS CONTINUOUS PRN
Status: DISCONTINUED | OUTPATIENT
Start: 2021-12-11 | End: 2021-12-13 | Stop reason: HOSPADM

## 2021-12-11 RX ORDER — CARBOPROST TROMETHAMINE 250 UG/ML
250 INJECTION, SOLUTION INTRAMUSCULAR
Status: DISCONTINUED | OUTPATIENT
Start: 2021-12-11 | End: 2021-12-11 | Stop reason: HOSPADM

## 2021-12-11 RX ORDER — MAGNESIUM CARB/ALUMINUM HYDROX 105-160MG
30 TABLET,CHEWABLE ORAL DAILY PRN
Status: DISCONTINUED | OUTPATIENT
Start: 2021-12-11 | End: 2021-12-11

## 2021-12-11 RX ADMIN — BENZOCAINE AND LEVOMENTHOL: 200; 5 SPRAY TOPICAL at 23:32

## 2021-12-11 RX ADMIN — KETOROLAC TROMETHAMINE 30 MG: 30 INJECTION, SOLUTION INTRAMUSCULAR; INTRAVENOUS at 21:33

## 2021-12-11 RX ADMIN — Medication 340 ML/HR: at 21:24

## 2021-12-11 ASSESSMENT — MIFFLIN-ST. JEOR: SCORE: 1293.35

## 2021-12-12 VITALS
OXYGEN SATURATION: 98 % | BODY MASS INDEX: 29.23 KG/M2 | TEMPERATURE: 98.4 F | SYSTOLIC BLOOD PRESSURE: 101 MMHG | HEIGHT: 59 IN | DIASTOLIC BLOOD PRESSURE: 73 MMHG | WEIGHT: 145 LBS | HEART RATE: 88 BPM | RESPIRATION RATE: 16 BRPM

## 2021-12-12 PROCEDURE — 120N000001 HC R&B MED SURG/OB

## 2021-12-12 PROCEDURE — 250N000013 HC RX MED GY IP 250 OP 250 PS 637: Performed by: FAMILY MEDICINE

## 2021-12-12 PROCEDURE — 99207 PR SUBSEQUENT HOSPITAL CARE FOR MOTHER, 15 MINUTES: CPT | Performed by: FAMILY MEDICINE

## 2021-12-12 RX ADMIN — IBUPROFEN 800 MG: 800 TABLET, FILM COATED ORAL at 09:23

## 2021-12-12 RX ADMIN — ACETAMINOPHEN 650 MG: 325 TABLET ORAL at 18:40

## 2021-12-12 RX ADMIN — IBUPROFEN 800 MG: 800 TABLET, FILM COATED ORAL at 03:36

## 2021-12-12 RX ADMIN — DOCUSATE SODIUM 100 MG: 100 CAPSULE, LIQUID FILLED ORAL at 09:23

## 2021-12-12 RX ADMIN — Medication 1 G: at 09:23

## 2021-12-12 RX ADMIN — IBUPROFEN 800 MG: 800 TABLET, FILM COATED ORAL at 15:51

## 2021-12-12 NOTE — PROCEDURES
VAGINAL DELIVERY NOTE    Delivery Date: 12/11/2021     PRE DELIVERY DIAGNOSIS  1) Intrauterine pregnancy at 40w0d   2) TOLAC  3) Labor  POST DELIVERY DIAGNOSIS  1) Intrauterine pregnancy at 40w0d  2) Delivery of a Viable Male Infant    INDUCTION: No    Provider: Miriam Garza MD  Primary MD - Felicia Umana MD    Monitors: External    GBS: Negative    ROM: SROM       Fluid Type:  Clear and Light meconium    Analgesia/Anesthesia:  None    Placenta submitted to Pathology: No  Cord pH obtained: No    FINDINGS:  1) Baby is a viable male infant      Delivery Date: 12/11/2021      Baby A: Birth Weight: pending      1/5 Minute Apgars: 9&9      Presentation/Position: SHARIFA      Nuchal Cord No      2) Episiotomy No      3) Other pertinent information: I was standing by for delivery, covering for Dr. Rivera, with patient pushing effectively and needing to push after SROM. She progressed quickly and delivered right before Dr. Rivera arrived. Dr. Rivera took over care at that point - please see her notes for further documentation.     Miriam Garza M.D., FACOG  12/11/2021

## 2021-12-12 NOTE — PLAN OF CARE
Problem: Pain (Postpartum Vaginal Delivery)  Goal: Acceptable Pain Control  Outcome: Improving  Ladonna reports adequate pain control (intermittent uterine cramping).  Ibuprofen and Tylenol prn.  Abdominal binder in place.     Problem: Bleeding (Postpartum Vaginal Delivery)  Goal: Hemostasis  Outcome: Improving  Bleeding is light; fundus firm; vitals stable.  Assess per protocol.     Problem: Breastfeeding  Goal: Effective Breastfeeding  Outcome: Improving  Intervention: Promote Effective Breastfeeding  Parent/Child Attachment Promotion:   caring behavior modeled   cue recognition promoted   strengths emphasized  Intervention: Support Exclusive Breastfeeding Success  Supportive Measures:   verbalization of feelings encouraged   active listening utilized   goal-setting facilitated   positive reinforcement provided   self-care encouraged

## 2021-12-12 NOTE — L&D DELIVERY NOTE
OB Vaginal Delivery Note    Ladonna Casiano MRN# [id number]   Age: 26 year old YOB: 1995       GA: 40w0d  GP:   Labor Complications: None   EBL:   mL  Delivery QBL: 350 mL  Delivery Type: Vaginal, Spontaneous   ROM to Delivery Time: (Delivered) Minutes: 12  Street Weight:     1 Minute 5 Minute 10 Minute   Apgar Totals:            JULITOMRACHELLETALYA A;AVA AMANDA TAMAYO     Delivery Details:  Ladonna Casiano, a 26 year old  female delivered a viable infant with apgars of  9 and 9  . Patient presented to the hospital 6cm with bulging bag of water. She quickly progressed to complete within the next 30 minutes and SROM then quickly delivered   after 12 minutes in active labor. Dr. Greg ARORA  was present for the precipitous delivery and delivered a viable baby boy. Delivery was via vaginal, spontaneous  to a sterile field. Infant delivered in vertex  left  occiput  anterior  position. Anterior and posterior shoulders delivered without difficulty. I entered the room just as he was delivering. The cord was clamped, cut twice. . Cord blood was obtained in routine fashion with the following disposition:  .    Dr. Garza then allowed for me to manage 3rd stage of labor and I delivered the placenta.   Cord complications: none   Placenta delivered at 2021  9:25 PM . Placental disposition was normal   . Fundal massage performed and fundus found to be firm.     Episiotomy: none    Perineum, vagina, cervix were inspected by myself and no lacerations were noted      Excellent hemostasis was noted. Needle count correct. Infant and patient in delivery room in good and stable condition.        Michael Casiano-Ladonna [[id number]]    Labor Event Times    Labor onset date: 21 Onset time:  7:00 PM   Dilation complete date: 21 Complete time:  9:08 PM   Start pushing date/time: 2021 2108      Labor Events     labor?: No   steroids: None  Labor Type: Spontaneous  Predominate  monitoring during 1st stage: continuous electronic fetal monitoring     Antibiotics received during labor?: No     Rupture date/time: 12/11/21 2108   Rupture type: Spontaneous rupture of membranes occuring during spontaneous labor or augmentation  Fluid color: Meconium     Augmentation: None  1:1 continuous labor support provided by?: RN       Delivery/Placenta Date and Time    Delivery Date: 12/11/21 Delivery Time:  9:20 PM   Placenta Date/Time: 12/11/2021  9:25 PM  Oxytocin given at the time of delivery: after delivery of baby   Other personnel present at delivery:  Provider Role   Kristine Yates RN Registered Nurse   Rhoda Rivera, RN Charge Nurse         Cord    Cord Complications: None               Stem cell collection?: No       Labor Events and Shoulder Dystocia    Fetal Tracing Prior to Delivery: Category 1  Shoulder dystocia present?: Neg     Delivery (Maternal) (Provider to Complete) ([id number])    Episiotomy: None  Perineal lacerations: None    Repair suture: None  Genital tract inspection done: Pos     Blood Loss  Mother: Ladonna Casiano #[id number]   Start of Mother's Information    Delivery Blood Loss  12/11/21 1900 - 12/11/21 2149    Delivery QBL (mL) Hospital Encounter 350 mL    Total  350 mL         End of Mother's Information  Mother: Ladonna Casiano #[id number]          Delivery - Provider to Complete ([id number])    Delivery Type (Choose the 1 that will go to the Birth History): Vaginal, Spontaneous                   Other personnel:  Provider Role   Kristine Yates RN Registered Nurse   Rhoda Rivera RN Charge Nurse                Placenta    Date/Time: 12/11/2021  9:25 PM  Removal: Spontaneous           Presentation and Position    Presentation: Vertex    Position: Left Occiput Anterior                 Anabella Rivera DO

## 2021-12-12 NOTE — PLAN OF CARE
Problem: Labor Pain (Labor)  Goal: Acceptable Pain Control  Outcome: Improving     Problem: Infection (Labor)  Goal: Absence of Infection Signs and Symptoms  Outcome: Improving     Problem: Delayed Labor Progression (Labor)  Goal: Effective Progression to Delivery  Outcome: Improving

## 2021-12-12 NOTE — PROGRESS NOTES
[facility] Obstetrics Post-Partum Progress Note          Assessment and Plan:    Assessment:   Post-partum day #1  Normal spontaneous vaginal delivery  L&D complications: None      Doing well.  No excessive bleeding  Pain well-controlled.      Plan:   Ambulation encouraged  Breast feeding strategies discussed  Anticipate discharge tomorrow           Interval History:   Doing well.  Pain is well-controlled.  No fevers.  No history of foul-smelling vaginal discharge.  Good appetite.  Denies chest pain, shortness of breath, nausea or vomiting.  Vaginal bleeding is similar to a heavy menstrual flow.  Ambulatory.  Breastfeeding well.           Significant Problems:    None          Review of Systems:    The patient denies any chest pain, shortness of breath, excessive pain, fever, chills, purulent drainage from the wound, nausea or vomiting.          Medications:   All medications related to the patient's surgery have been reviewed          Physical Exam:   All vitals stable  Uterine fundus is firm, non-tender and at the level of the umbilicus          Data:   All laboratory data related to this surgery reviewed  All imaging studies related to this surgery reviewed    Anabella Rivera DO

## 2021-12-13 NOTE — PLAN OF CARE
Problem: Adult Inpatient Plan of Care  Goal: Plan of Care Review  Outcome: Adequate for Discharge  Goal: Patient-Specific Goal (Individualized)  Outcome: Adequate for Discharge  Goal: Absence of Hospital-Acquired Illness or Injury  Outcome: Adequate for Discharge  Goal: Optimal Comfort and Wellbeing  Outcome: Adequate for Discharge  Goal: Readiness for Transition of Care  Outcome: Adequate for Discharge     Problem: Uterine Tachysystole (Labor)  Goal: Normal Uterine Contraction Pattern  Outcome: Adequate for Discharge     Problem: Adjustment to Role Transition (Postpartum Vaginal Delivery)  Goal: Successful Maternal Role Transition  Outcome: Adequate for Discharge     Problem: Bleeding (Postpartum Vaginal Delivery)  Goal: Hemostasis  Outcome: Adequate for Discharge     Problem: Infection (Postpartum Vaginal Delivery)  Goal: Absence of Infection Signs and Symptoms  Outcome: Adequate for Discharge     Problem: Pain (Postpartum Vaginal Delivery)  Goal: Acceptable Pain Control  Outcome: Adequate for Discharge     Problem: Breastfeeding  Goal: Effective Breastfeeding  Outcome: Adequate for Discharge    Pt discharged at 2355. Writer reviewed AVS with pt including the warning signs and pt verbalized understanding. Pt denies discomfort/pain. Pt have her belongings. This writer escorted her, the baby and the baby's father out of the hospital.

## 2021-12-13 NOTE — DISCHARGE SUMMARY
[facility] Discharge Summary    Ladonna Casiano MRN# [id number]   Age: 26 year old YOB: 1995     Date of Admission:  12/11/2021  Date of Discharge::  12/12/2021  Admitting Physician:  Anabella Rivera MD  Discharge Physician:  Anabella Rivera MD     Home clinic: [facility]          Admission Diagnoses:   Encounter for triage in pregnant patient [Z36.89]  Pregnant [Z34.90]          Discharge Diagnosis:   Normal spontaneous vaginal delivery  Intrauterine pregnancy at 40+0 weeks gestation          Procedures:   Procedure(s): No additional procedures performed       All laboratory and imaging data in the past 24 hours reviewed           Medications Prior to Admission:     Medications Prior to Admission   Medication Sig Dispense Refill Last Dose     prenatal vitamin iron-folic acid 27mg-0.8mg (PRENATAL S) 27 mg iron- 800 mcg Tab tablet [PRENATAL VITAMIN IRON-FOLIC ACID 27MG-0.8MG (PRENATAL S) 27 MG IRON- 800 MCG TAB TABLET] Take 1 tablet by mouth daily.                Discharge Medications:     Current Facility-Administered Medications   Medication     acetaminophen (TYLENOL) tablet 650 mg     benzocaine-menthol (DERMOPLAST) topical spray     bisacodyl (DULCOLAX) Suppository 10 mg     carboprost (HEMABATE) injection 250 mcg     docusate sodium (COLACE) capsule 100 mg     hydrocortisone 2.5 % cream     ketorolac (TORADOL) injection 30 mg    Or     ketorolac (TORADOL) injection 30 mg    Or     ibuprofen (ADVIL/MOTRIN) tablet 600 mg     ibuprofen (ADVIL/MOTRIN) tablet 800 mg     lanolin cream     lidocaine 1 % 0.1-20 mL     methylergonovine (METHERGINE) injection 200 mcg     mineral oil light external liquid     misoprostol (CYTOTEC) tablet 400 mcg    Or     misoprostol (CYTOTEC) tablet 800 mcg     No MMR Needed - Assessment: Patient does not need MMR vaccine     No Tdap Needed - Assessment: Patient does not need Tdap vaccine     oxytocin (PITOCIN) 30 units in 500 mL 0.9% NaCl  infusion     oxytocin (PITOCIN) 30 units in 500 mL 0.9% NaCl infusion     oxytocin (PITOCIN) injection 10 Units     oxytocin (PITOCIN) injection 10 Units     tranexamic acid (CYKLOKAPRON) bolus 1 g vial attach to NaCl 50 or 100 mL bag ADULT     witch hazel-glycerin (TUCKS) pad             Consultations:   No consultations were requested during this admission          Brief History of Labor:   See admission. Patient presented to hospital at 6cm with bulging bag. SROM            Hospital Course:   The patient's hospital course was unremarkable.  On discharge, her pain was well controlled.  Vaginal bleeding is similar to peak menstrual flow.  Voiding without difficulty.  Ambulating well and tolerating a normal diet.  No fever.  Breastfeeding  well.  Infant is stable.  No bowel movement yet.*  She was discharged on post-partum day #2 .    Post-partum hemoglobin:   Hemoglobin   Date Value Ref Range Status   12/11/2021 14.0 11.7 - 15.7 g/dL Final             Discharge Instructions and Follow-Up:   Discharge diet: Regular   Discharge activity: Activity as tolerated   Discharge follow-up: Follow up with primary care provider in 2 days   Wound care: N/a           Discharge Disposition:   Discharged to home      Attestation:  -    Anabella Rivera DO

## 2021-12-14 LAB — T PALLIDUM AB SER QL: NONREACTIVE

## 2021-12-15 NOTE — H&P
"  December 15, 2021    Apryl Casiano  [id number]            OB Admit History & Physical      Ms. Casiano  is here in active labor and dilated to 6cm with bulging bag. Anticipate precipitous delivery. Planning for  and has had previous normal        No LMP recorded (lmp unknown).   Her Estimated Date of Delivery: Data Unavailable  , making her 40w0d  wks.      Estimated body mass index is 29.29 kg/m  as calculated from the following:    Height as of this encounter: 1.499 m (4' 11\").    Weight as of this encounter: 65.8 kg (145 lb).  Her prenatal course has been uncomplicated.   See prenatal for labs.  neg GBBS, Rubella immune Immune, RH pos    Estimated fetal weight= <4500gm       She is a 26 year old   Her OB history:   OB History    Para Term  AB Living   3 3 3 0 0 3   SAB IAB Ectopic Multiple Live Births   0 0 0 0 3      # Outcome Date GA Lbr [place]/2nd Weight Sex Delivery Anes PTL Lv   3 Term 21 40w0d 02:08 / 00:12 3.57 kg (7 lb 13.9 oz) M  None N LULY      Name: KIMMALE-APRYL      Apgar1: 9  Apgar5: 9   2 Term 17 38w1d 14:05 / 00:11 3.062 kg (6 lb 12 oz) F  None N LULY      Name: KIMFEMALE-APRYL      Apgar1: 9  Apgar5: 9   1 Term 16 40w4d  2.99 kg (6 lb 9.5 oz) M CS-LTranv EPI N LULY      Complications: Failure to Progress in Second Stage, Chorioamnionitis      Apgar1: 9  Apgar5: 9            Past Medical History:   Diagnosis Date     Oligohydramnios, third trimester, fetus 1 3/28/2016     Previous  delivery, antepartum condition or complication 3/29/2017    Delivered 16     , delivered, current hospitalization           Past Surgical History:   Procedure Laterality Date     C/SECTION, LOW TRANSVERSE N/A 2016         Current Outpatient Medications   Medication Sig Dispense Refill     prenatal vitamin iron-folic acid 27mg-0.8mg (PRENATAL S) 27 mg iron- 800 mcg Tab tablet [PRENATAL VITAMIN IRON-FOLIC ACID 27MG-0.8MG (PRENATAL S) 27 MG IRON- 800 MCG " TAB TABLET] Take 1 tablet by mouth daily.         Allergies: Patient has no known allergies.      REVIEW OF SYSTEMS:  NEUROLOGIC:  Negative  EYES:  Negative  ENT:  Negative  GI:  Negative  BREAST:  Negative  :  Negative  GYN:  Negative  CV:  Negative  PULMONARY:  Negative  MUSCULOSKELETAL:  Negative  PSYCH:  Negative        Social History     Socioeconomic History     Marital status:      Spouse name: Not on file     Number of children: Not on file     Years of education: Not on file     Highest education level: Not on file   Occupational History     Not on file   Tobacco Use     Smoking status: Never Smoker     Smokeless tobacco: Never Used   Substance and Sexual Activity     Alcohol use: Not Currently     Alcohol/week: 10.0 standard drinks     Drug use: No     Sexual activity: Yes     Partners: Male     Birth control/protection: None     Comment:  Ganesh   Other Topics Concern     Parent/sibling w/ CABG, MI or angioplasty before 65F 55M? No   Social History Narrative     Not on file     Social Determinants of Health     Financial Resource Strain: Not on file   Food Insecurity: Not on file   Transportation Needs: Not on file   Physical Activity: Not on file   Stress: Not on file   Social Connections: Not on file   Intimate Partner Violence: Not on file   Housing Stability: Not on file      Family History   Problem Relation Age of Onset     No Known Problems Mother      No Known Problems Father      Diabetes Paternal Grandfather      Other Cancer Paternal Grandfather      Hypertension Maternal Grandmother      Depression Maternal Grandmother      Anxiety Disorder Maternal Grandmother      Other Cancer Paternal Grandmother      Breast Cancer No family hx of      Cancer No family hx of      Colon Cancer No family hx of      Heart Disease No family hx of              Vitals: stable    Alert Awake in NAD      Assessment:  IUP at 40w0d   -active labor planning for     Plan:  Anticipate precipitous  delivery      Anabella Rivera DO  Dept of OB/GYN  December 15, 2021

## 2022-01-11 ENCOUNTER — MEDICAL CORRESPONDENCE (OUTPATIENT)
Dept: HEALTH INFORMATION MANAGEMENT | Facility: CLINIC | Age: 27
End: 2022-01-11
Payer: MEDICAID

## 2022-01-18 VITALS
HEART RATE: 108 BPM | BODY MASS INDEX: 26.13 KG/M2 | WEIGHT: 129.6 LBS | DIASTOLIC BLOOD PRESSURE: 68 MMHG | HEIGHT: 59 IN | SYSTOLIC BLOOD PRESSURE: 108 MMHG

## 2022-01-18 VITALS
SYSTOLIC BLOOD PRESSURE: 129 MMHG | RESPIRATION RATE: 16 BRPM | DIASTOLIC BLOOD PRESSURE: 84 MMHG | TEMPERATURE: 98.1 F | WEIGHT: 136.56 LBS | OXYGEN SATURATION: 96 % | BODY MASS INDEX: 27.49 KG/M2 | HEART RATE: 94 BPM

## 2022-01-18 VITALS
BODY MASS INDEX: 27.21 KG/M2 | DIASTOLIC BLOOD PRESSURE: 79 MMHG | HEIGHT: 59 IN | HEART RATE: 81 BPM | SYSTOLIC BLOOD PRESSURE: 124 MMHG | WEIGHT: 135 LBS

## 2022-01-18 VITALS — TEMPERATURE: 97 F | HEIGHT: 59 IN | BODY MASS INDEX: 27.09 KG/M2 | WEIGHT: 134.4 LBS

## 2022-01-25 ENCOUNTER — MEDICAL CORRESPONDENCE (OUTPATIENT)
Dept: HEALTH INFORMATION MANAGEMENT | Facility: CLINIC | Age: 27
End: 2022-01-25

## 2022-01-25 ENCOUNTER — PRENATAL OFFICE VISIT (OUTPATIENT)
Dept: FAMILY MEDICINE | Facility: CLINIC | Age: 27
End: 2022-01-25
Payer: MEDICAID

## 2022-01-25 VITALS
HEART RATE: 68 BPM | BODY MASS INDEX: 25.65 KG/M2 | SYSTOLIC BLOOD PRESSURE: 116 MMHG | DIASTOLIC BLOOD PRESSURE: 62 MMHG | WEIGHT: 127 LBS

## 2022-01-25 PROBLEM — R21 RASH AND NONSPECIFIC SKIN ERUPTION: Status: RESOLVED | Noted: 2021-12-01 | Resolved: 2022-01-25

## 2022-01-25 PROBLEM — Z34.90 PREGNANT: Status: RESOLVED | Noted: 2021-12-11 | Resolved: 2022-01-25

## 2022-01-25 PROBLEM — O09.93 SUPERVISION OF HIGH RISK PREGNANCY IN THIRD TRIMESTER: Status: RESOLVED | Noted: 2021-08-21 | Resolved: 2022-01-25

## 2022-01-25 PROBLEM — Z36.89 ENCOUNTER FOR TRIAGE IN PREGNANT PATIENT: Status: RESOLVED | Noted: 2021-12-09 | Resolved: 2022-01-25

## 2022-03-01 ENCOUNTER — MEDICAL CORRESPONDENCE (OUTPATIENT)
Dept: HEALTH INFORMATION MANAGEMENT | Facility: CLINIC | Age: 27
End: 2022-03-01
Payer: MEDICAID

## 2022-04-13 ENCOUNTER — MEDICAL CORRESPONDENCE (OUTPATIENT)
Dept: HEALTH INFORMATION MANAGEMENT | Facility: CLINIC | Age: 27
End: 2022-04-13
Payer: MEDICAID

## 2022-10-01 ENCOUNTER — HEALTH MAINTENANCE LETTER (OUTPATIENT)
Age: 27
End: 2022-10-01

## 2023-05-14 ENCOUNTER — HEALTH MAINTENANCE LETTER (OUTPATIENT)
Age: 28
End: 2023-05-14

## 2023-05-24 ENCOUNTER — OFFICE VISIT (OUTPATIENT)
Dept: FAMILY MEDICINE | Facility: CLINIC | Age: 28
End: 2023-05-24
Payer: COMMERCIAL

## 2023-05-24 ENCOUNTER — HOSPITAL ENCOUNTER (OUTPATIENT)
Dept: ULTRASOUND IMAGING | Facility: HOSPITAL | Age: 28
Discharge: HOME | End: 2023-05-24
Attending: FAMILY MEDICINE | Admitting: FAMILY MEDICINE
Payer: COMMERCIAL

## 2023-05-24 VITALS
HEIGHT: 59 IN | BODY MASS INDEX: 26.21 KG/M2 | OXYGEN SATURATION: 100 % | WEIGHT: 130 LBS | DIASTOLIC BLOOD PRESSURE: 75 MMHG | HEART RATE: 90 BPM | TEMPERATURE: 98.4 F | RESPIRATION RATE: 14 BRPM | SYSTOLIC BLOOD PRESSURE: 119 MMHG

## 2023-05-24 DIAGNOSIS — Z3A.08 8 WEEKS GESTATION OF PREGNANCY: Primary | ICD-10-CM

## 2023-05-24 DIAGNOSIS — Z3A.08 8 WEEKS GESTATION OF PREGNANCY: ICD-10-CM

## 2023-05-24 DIAGNOSIS — L98.9 SKIN LESION: ICD-10-CM

## 2023-05-24 LAB — HCG UR QL: POSITIVE

## 2023-05-24 PROCEDURE — 99213 OFFICE O/P EST LOW 20 MIN: CPT | Performed by: FAMILY MEDICINE

## 2023-05-24 PROCEDURE — 81025 URINE PREGNANCY TEST: CPT | Performed by: FAMILY MEDICINE

## 2023-05-24 PROCEDURE — 76801 OB US < 14 WKS SINGLE FETUS: CPT

## 2023-05-24 ASSESSMENT — PAIN SCALES - GENERAL: PAINLEVEL: NO PAIN (0)

## 2023-05-24 NOTE — PATIENT INSTRUCTIONS
"You can call [phone] to schedule your early ultrasound at your convenience.    Pregnancy: Your First Trimester Changes  The first trimester is a time of rapid development for your baby. Because your baby is growing so quickly, it is important that you start a healthy lifestyle right away. By the end of the first trimester, your baby has formed all of its major body organs and weighs just over an ounce.  Month 1 (weeks 1 to 4)  The placenta (the organ that nourishes your baby) begins to form. The brain, spinal cord, heart, gastrointestinal tract, and lungs begin to develop. Your baby is about   inch long by the end of the first month.     Actual size of baby is 1/4\".     Month 2 (weeks 5 to 8)  All of your baby s major body organs form. The face, fingers, toes, ears, and eyes appear. By the end of the month, your baby is about 1 inch long.     Actual size of baby is 1\".     Month 3 (weeks 9 to 12)  Your baby can open and close its fists and mouth. The sexual organs begin to form. As the first trimester ends, your baby is about 3 inches long.     Actual size of baby is 3\".     [employer] last reviewed this educational content on 8/1/2020 2000-2022 [employer]. All rights reserved. This information is not intended as a substitute for professional medical care. Always follow your healthcare professional's instructions.          Vitamin B6 (pyridoxine) Oral Tablet  Uses  This medicine is used for the following purposes:  metabolism disorder  nausea and vomiting  vitamin deficiency  Instructions  This medicine may be taken with or without food.  Store at room temperature away from heat, light, and moisture. Do not keep in the bathroom.  If you forget to take a dose on time, take it as soon as you remember. If it is almost time for the next dose, do not take the missed dose. Return to your normal dosing schedule. Do not take 2 doses of this medicine at one time.  Drug interactions can change how " medicines work or increase risk for side effects. Tell your health care providers about all medicines taken. Include prescription and over-the-counter medicines, vitamins, and herbal medicines. Speak with your doctor or pharmacist before starting or stopping any medicine.  Speak with your doctor or pharmacist before starting any other vitamins.  It is very important that you follow your doctor's instructions for all blood tests.  Cautions  Tell your doctor and pharmacist if you ever had an allergic reaction to a medicine.  Do not use the medication any more than instructed.  Tell the doctor or pharmacist if you are pregnant, planning to be pregnant, or breastfeeding.  Side Effects  If you have any of the following side effects, you may be getting too much medicine. Please contact your doctor to let them know about these side effects.  drowsiness or sedation  numbness or tingling in hands and feet  headaches  nausea  stomach upset or abdominal pain  This medicine usually has no side effects.  A few people may have an allergic reaction to this medicine. Symptoms can include difficulty breathing, skin rash, itching, swelling, or severe dizziness. If you notice any of these symptoms, seek medical help quickly.  Extra  Please speak with your doctor, nurse, or pharmacist if you have any questions about this medicine.  [email or web address]  IMPORTANT NOTE: This document tells you briefly how to take your medicine, but it does not tell you all there is to know about it. Your doctor or pharmacist may give you other documents about your medicine. Please talk to them if you have any questions. Always follow their advice. There is a more complete description of this medicine available in English. Scan this code on your smartphone or tablet or use the web address below. You can also ask your pharmacist for a printout. If you have any questions, please ask your pharmacist. The display and use of this drug  information is subject to Terms of Use. Copyright(c) 2022 [employer]     [phone] [employer]. All rights reserved. This information is not intended as a substitute for professional medical care. Always follow your healthcare professional's instructions.          Adapting to Pregnancy: First Trimester  As your body adjusts during your first trimester of pregnancy, you may have to change or limit your daily activities. You ll need more rest. You may also need to use the energy you have more wisely.   Your changing body  Almost every part of your body is affected as you adapt to pregnancy. The uterus and cervix will start to soften right away. You may not look very pregnant during the first 3 months. But you are likely to have some common signs of early pregnancy:   Nausea  Fatigue  Frequent urination  Mood swings  Bloating of the belly  Constipation  Heartburn  Missed or light periods (first trimester bleeding)  Nipple or breast tenderness and breast swelling  It s not too late to start good habits   What matters most is protecting your baby from this moment on. If you smoke, drink alcohol, or use drugs, now is the time to stop. If you need help, talk with your healthcare provider:   Smoking increases the risk of stillbirth or having a low-birth-weight baby. If you smoke, quit now.  Alcohol and drugs have been linked with miscarriage, birth defects, intellectual disability, and low birth weight. Don't drink alcohol or take drugs.  Tips to relieve nausea  During pregnancy, nausea can happen at any time of the day, but it may be worse in the morning. To help prevent nausea:   Eat small, light meals at frequent intervals.  Drink fluids often.  Get up slowly. Eat a few unsalted crackers before you get out of bed.  Avoid smells that bother you.  Avoid spicy and fatty foods.  Eat an ice pop in your favorite flavor.  Get plenty of rest.  Ask your healthcare provider about taking yaya or vitamin B6  for nausea and vomiting.  Talk with your healthcare provider if you take vitamins that upset your stomach.   Work concerns  The end of the first trimester is a good time to discuss working during pregnancy with your employer. Follow your healthcare provider s advice if your job needs you to stand for a long time, work with hazardous tools, or even sit at a desk all day. Your workspace, workload, or scheduled hours may need to be adjusted. Perhaps you can change body postures more often or take an extra break.   Advice for travel  Talk to your healthcare provider first, but the second trimester may be the best time for any travel. You may be advised to avoid certain trips while you re pregnant. Food and water can be concerns in developing countries. Travel by car is a good choice, as you can stop, get out, and stretch. Bring snacks and water along. Fasten the lap belt below your belly, low over your hips. Also be sure to wear the shoulder harness.   Intimacy  Unless your healthcare provider tells you to, there's no reason to stop having sex while you re pregnant. You or your partner may notice changes in desire. Desire may be less in the first trimester, due to nausea and fatigue. In the second trimester, sex may be very enjoyable. The third trimester can be a challenge comfort-wise. Try different positions and see what s best for you both.   [employer] last reviewed this educational content on 4/1/2020 2000-2022 [employer]. All rights reserved. This information is not intended as a substitute for professional medical care. Always follow your healthcare professional's instructions.          Pregnancy: Common Questions  There are plenty of myths and  old wives  tales  about pregnancy. You may need help  fact from fiction. On this sheet, you ll find answers to a few common questions. If you have other questions, talk with your healthcare provider.    Will working harm my baby?  In most cases,  working throughout your pregnancy is not harmful at all. There may be concerns if the job involves dangerous machinery or chemicals, lifting, or standing for very long periods of time. Talk with your healthcare provider and employer about your particular job and pregnancy.  Is it safe to have sex during pregnancy?  Yes, unless you are specifically advised not to by your healthcare provider.  Why can t I change the cat litter box?  Cats carry a disease called toxoplasmosis. In adult humans, it shows up as a mild infection of the blood and organs. If you are infected during pregnancy, the baby s brain and eyes could be damaged. To be safe, have someone else change the litter. If you must handle it, wear a paper mask over your nose and mouth. Also, wear gloves and wash your hands afterward.  Which medicines are safe?  No prescription or over-the-counter medicine is safe for everyone all of the time. But sometimes medicines are needed. Be sure your healthcare provider knows you are pregnant. Then use only the medicines he or she advises you to take.  Is it true that I can overheat my baby?  Yes. To prevent making your baby too warm:  Don t sit in a Jacuzzi. A long, warm bath is fine, but not in water over 100 F (37.7 C).  Exercise less intensely if you feel tired. Base your workout on how you feel, not your heart rate. Heart rates aren t a good way to measure effort during pregnancy.  Can I lift and carry safely?  Yes, if your healthcare provider doesn t tell you otherwise. Learn to lift and carry safely to prevent injury and reduce back pain during pregnancy. To protect your back:  Bend at the knees to bring the load nearer.  Get a good . Test the weight of the load.  Tighten your belly. Exhale as you lift.  Lift with your legs, not with your back.  Carry the load close to your body.  Hold the load so you can see where you are going.  What if I get sick?  Most women get sick at least once during pregnancy. Talk  with your healthcare provider if you do. Most likely it will not affect your pregnancy. Get plenty of rest and fluids, and eat what you can. Talk with your provider before taking any medicines.  [employer] last reviewed this educational content on 8/1/2020 2000-2022 [employer]. All rights reserved. This information is not intended as a substitute for professional medical care. Always follow your healthcare professional's instructions.          Comfort Tips During Pregnancy  Pregnancy can bring discomfort of different kinds. Below are tips for ways to feel better. Talk with your healthcare provider before using pain-relieving medicine at any time during your pregnancy.    First trimester tips  Easing nausea  Get up slowly. Eat a few unsalted crackers before you get out of bed.  Avoid smells that bother you.  Eat small, bland, low-fat, high-protein meals at frequent intervals.  Sip on water, weak tea, or clear soft drinks, like ginger ale. Eat ice chips.  Try taking vitamin B6.  Coping with fatigue  Take catnaps when you can.  Get regular exercise.  Accept help from others.  Practice good sleep habits, like going to bed and getting up at the same time each day. Use your bed only for sleep and sex.  Calming mood swings  Talk about your feelings with others, including other mothers.  Limit sugar, chocolate, and caffeine.  Eat a healthy diet. Don t skip meals.  Get regular exercise.  Soothing headaches  Get fresh air and exercise.  Relax and get enough rest.  Check with your healthcare provider before taking any pain medicines.    Second trimester tips  To limit ankle swelling, sit with your feet raised or wear support hose.  If you have pain in your groin and stomach (round ligament pain), don't make sudden twisting movements with your body.  For leg cramps, flexing your foot often brings immediate relief. Also try massaging your calf in long, downward strokes, or stretching your legs before going to  bed. Get enough exercise and wear shoes with flexible soles. Eat foods rich in calcium.    Third trimester tips  Reducing heartburn  Eat small, light meals throughout the day rather than 3 large ones.  Sleep with your upper body raised 6 inches. Don t lie down until 2 hours after you eat.  Don't eat greasy, fried, or spicy foods.  Don't have citrus fruits or juices.  Treating constipation  Eat foods high in fiber, such as whole-grain foods, and fresh fruit and vegetables).  Drink plenty of water.  Get regular exercise.  Ask about your healthcare provider about medicines that have docusate or psyllium.  Taking care of your breasts  Don't use harsh soaps or alcohol, which can make your skin too dry.  Wear nursing bras. They provide more support than regular bras and can be used after pregnancy if you breastfeed.  Getting a good night s sleep  Take a warm shower before bed.  Sleep on a firm mattress.  Lie on your side with 1 leg crossed over the other.  Use pillows to support your arms, legs, and belly.    [employer] last reviewed this educational content on 8/1/2020 2000-2022 [employer]. All rights reserved. This information is not intended as a substitute for professional medical care. Always follow your healthcare professional's instructions.          Folic Acid Supplements  Folic acid is also called folate. It is one of the B vitamins. Nutrition experts are just starting to learn more about how folic acid helps the body. It is needed to prevent a shortage of red blood cells (a type of anemia). Experts have also found that folic acid can prevent some birth defects.     How much folic acid do you need?  Adults should have 400 mcg (micrograms) of folic acid each day. Adults may need more if they are planning to get pregnant, are pregnant, or are breastfeeding. Talk with your healthcare provider to find the right amount of folic acid for you.   Why use a supplement?  Taking folic acid both before and  during pregnancy is important. This can prevent birth defects of the spine and brain (neural tube defects). A supplement may also be helpful if you drink alcohol often. You may want to use a folic acid supplement if any of the following is true for you:   [] I am a person of childbearing age.   [] I am planning to get pregnant.  [] I rarely eat leafy green vegetables, dried beans, or lentils.   [] I rarely eat cereal and whole grains (wheat germ, brown rice, whole-wheat bread).  [] I often have more than 1 drink of alcohol a day.   If you take folic acid  Here are some tips to help you get the most from a folic acid supplement:  Read the label to be sure the product won't  soon.  Choose a supplement that provides 400 to 800 mcg of folic acid.  Store the supplement in a cool, dry place, away from sun and heat.  Eat a healthy diet to get all the nutrients your body needs.  Foods that have folic acid  Folic acid is found mainly in plants. Some good sources include:  Dark green leafy vegetables such as spinach, kale, collards, and twila lettuce  Lentils, chickpeas, and dried beans such as parsons, kidney, and black beans  Asparagus, bok arsalan, broad-beans, broccoli, and [place] sprouts  Avocados, oranges, and orange juice  Wheat germ and whole-wheat products  Products fortified with folic acid, such as cereal, pasta, bread, and rice  [employer] last reviewed this educational content on 2022-2022 [employer]. All rights reserved. This information is not intended as a substitute for professional medical care. Always follow your healthcare professional's instructions.          Anemia During Pregnancy  Anemia is a condition in which the red blood cell count is too low. In pregnant women, this is often caused by not having enough iron in the blood. Anemia is common in pregnancy and very easy to treat.   Why you need iron   While pregnant, your body uses iron to make red blood cells for you and  your baby. These cells bring oxygen to your baby and to the rest of your body. Not having enough red blood cells can cause your baby to be born too small. But this is rare, as it s easy for you to get enough iron.   Testing for anemia   The only way to know if you have anemia is to have a simple test called a CBC (complete blood count). This is a routine test that will be done at one of your first prenatal visits. This test may be done again, at about week 26 to week 28.   Treating anemia   If you have anemia due to low iron content, follow the advice of your healthcare provider. Eating foods high in iron and taking supplements can help you get the iron you need.   Eating foods high in iron      Green leafy vegetables and nuts are a good source of iron.     Eat foods that are high in iron such as:   Red meat (limit organ meats such as liver)  Seafood (be sure it s fully cooked), and don't eat fish that are high in mercury, such as swordfish, tilefish, lenin mackerel, and shark  Tofu  Eggs  Green, leafy vegetables  Whole grains and iron-fortified cereals  Dried fruits and nuts  Taking iron supplements   In most cases, a prenatal vitamin can provide enough iron. But if you need more, your healthcare provider may prescribe an iron supplement. Swallow iron pills with a glass of orange or cranberry juice. The vitamin C in these fruit juices can help your body absorb iron. But don t take your iron pills with juices that have calcium added to them. They can keep your body from absorbing the iron.   Iron supplements   Iron supplements may have certain side effects. They may cause your stools to turn black, and make you feel sick to your stomach or constipated. Here are some tips that may help you limit side effects:   Start slowly. Take 1 pill a day for a few days. Then work up to your prescribed dose over time.  Take your pills with meals, and not at bedtime.  Increase the fiber in your diet. Eat more whole grains, fruits,  and vegetables.  Do mild exercise each day.  If advised by your healthcare provider, take a stool softener.  [employer] last reviewed this educational content on 2/1/2020 2000-2022 [employer]. All rights reserved. This information is not intended as a substitute for professional medical care. Always follow your healthcare professional's instructions.          Rh-Negative Screening  If you have Rh-negative blood, your baby may be at risk for health problems. This is true only if your baby has Rh-positive blood. A simple test followed by treatment can help prevent problems.   What are the risks?  If the blood of your baby is Rh positive, your Rh-negative blood may form antibodies. These antibodies will attack the Rh-positive blood. This is called Rh disease. Rh disease can cause your baby to lose blood cells or have other health problems. Medical treatment can prevent Rh disease by keeping antibodies from forming.   How are you tested?  A simple blood test shows if you re Rh negative. This test is done very early in your pregnancy. If you re Rh negative, you ll have a second blood test near week 28 of pregnancy. This test will check whether or not your blood contains Rh antibodies.     [employer] last reviewed this educational content on 4/1/2020 2000-2022 [employer]. All rights reserved. This information is not intended as a substitute for professional medical care. Always follow your healthcare professional's instructions.          What Is Prenatal Care?  Before getting pregnant, you may have added some good health habits to get ready for your baby. But if you didn t, start today. One of the first steps is learning how to take care of yourself. See your healthcare provider as soon as you think you may be pregnant. Then continue prenatal care during your pregnancy.     Prenatal care helps you have a healthy baby   During prenatal care:  Your healthcare provider checks the health of  your pregnancy. They'll calculate a due date. This gives an estimate of your baby's delivery. Many people give birth between 38 and 41 weeks of pregnancy. Your due date is found by counting 40 weeks from the first day of your last menstrual period.  Your pregnancy's progress is checked. This includes your baby s growth, fetal heart rate, changes in your weight and blood pressure, and your overall health and comfort.  Your provider may find new concerns and manage current ones before problems happen.  Your provider will check lab work through blood and urine.  Your provider will talk about normal changes that happen during pregnancy. They'll also talk about changes that may not be normal. And they'll advise you about lifestyle changes.  Your provider will answer your questions. They'll also help you get ready for labor and delivery.  You're part of a team  When you re pregnant, you re part of a team. This team includes you, your baby, and your provider. It also may include a partner or a main support person. That could be a loved one, such as a spouse, a family member, or a friend. As you work to give your baby a healthy start, rely on your team members for support.   It s not too late to start good habits   What matters most is protecting your baby from this moment on. If you smoke, drink alcohol, or use illegal drugs, now's the time to stop. If you need help, talk with your healthcare provider.   Smoking increases the risk of losing your baby. Or of having a low-birth-weight baby. If you smoke, quit now.  Alcohol and drugs have been linked with many problems. These include miscarriage, birth defects, intellectual disability, and low birth weight. Stay away from alcohol and drugs.  Eat a healthy diet. This helps keep you and your baby strong and healthy. Follow your provider's instructions for nutrition. Also stay within the guidelines you're given for healthy weight gain.  Take 400 micrograms to 800 micrograms (400  mcg to 800 mcg or 0.4 mg to 0.8 mg) of folic acid every day. Take it for at least 1 month before getting pregnant. And keep taking it for the first trimester of your pregnancy. This is to lower your risk of some brain and spinal birth defects. You can get folic acid from some foods. But it's hard to get all the folic acid you'll need from foods alone. Talk with your provider about taking a folic acid supplement.  Regular exercise will help you stay fit and feel good during pregnancy. It can also help prevent or reduce back pain. Talk with your provider about how to exercise safely during pregnancy.  If you have a health condition, make sure it's under control. Some conditions include asthma, diabetes, depression, high blood pressure, obesity, thyroid disease, or epilepsy. Be sure your vaccines are up to date.  How daily issues affect your health  Many things in your daily life impact your health. This can include transportation, money problems, housing, access to food, and . If you can t get to medical appointments, you may not receive the care you need. When money is tight, it may be difficult to pay for medicines. And living far from a grocery store can make it hard to buy healthy food.   If you have concerns in any of these or other areas, talk with your healthcare team. They may know of local resources to assist you. Or they may have a staff person who can help.   [employer] last reviewed this educational content on 8/1/2020 2000-2022 [employer]. All rights reserved. This information is not intended as a substitute for professional medical care. Always follow your healthcare professional's instructions.          Understanding Intimate Partner Violence  Intimate partner violence (IPV) affects hundreds of thousands of women. But the true number may be much higher because many women may not report it. Partner violence often starts or gets worse during and after pregnancy. This may be from  the stress of pregnancy and a new baby. IPV can result in pregnancy complications, poor postpartum care, and poor health of the baby.  You may feel alone if you are experiencing intimate partner violence during or after your pregnancy, but know that you re not. It s far more common than you think. And there s help and hope. Talk with your healthcare provider if you are not safe.  Types of intimate partner violence  Most people think of IPV as just physical abuse. While it does often include physical abuse, IPV can be emotional and sexual abuse. These other forms of abuse are sometimes harder to see. This is especially true for emotional abuse, because it can distort your own viewpoint.  Physical abuse includes using physical force to hurt or disable a partner. It can include throwing objects, pushing, kicking, biting, slapping, strangling, hitting, or beating.  Emotional abuse includes making threats, and controlling money or other resources. It s anything that erodes a person s sense of self-worth. It may look like name-calling, blaming, and stalking. It can also include isolation from friends, family, and even access to healthcare.  Sexual violence includes rape, unwanted kissing, and forced touching. It also includes reproductive coercion. This is holding power and control over the woman s reproductive health. It may look like efforts to sabotage contraception, having unsafe sex to purposefully expose the woman to sexually transmitted infections (STIs). It may also include forced  or injuries to cause a miscarriage.  Are you at risk for IPV?  IPV affects all genders, races, ethnicities, and social and economic statuses. But some people are at greater risk for being a victim or an abuser. Certain factors can increase the risk for IPV.  Individual factors  Having low self-esteem, being emotional dependent, insecure, and jealous  Having low income or being unemployed, having recent job loss or job  "instability  Being younger  Using alcohol or drugs  Being depressed, angry, having PTSD, or being hostile towards women  Having a history of abusing others, being abused, or witnessing abuse  Having poor problem solving skills  Having poor impulse control  Being a Native [place], , or  woman  Relationship factors  Marital or relationship conflict, frequent fighting  Having a jealous or possessive partner  Having control issues  Being under economic stress  Having poor social support  Having income inequality    It often helps to ask yourself these questions from the [employer] to know if you are in an abusive relationship:  Does my partner always put me down and make me feel bad about myself?  Has my partner caused harm or pain to my body?  Does my partner threaten me, the baby, my other children or himself?  Does my partner blame me for his actions? Does he tell me it's my own fault he hit me?  Is my partner becoming more violent as time goes on?  Has my partner promised never to hurt me again, but still does?  If you answered \"yes\" to any of these questions, you may be in an unhealthy relationship.  If you recognize yourself or your partner in any of these descriptors, talk with your healthcare provider to get help. If you are in danger, he or she can help you develop a safety plan.  Health effects  IPV during or after pregnancy can cause physical and emotional health effects, pregnancy complications, poor outcomes, and injury and harm to the baby after birth.  During pregnancy  Physical injuries such as bruises, cuts, or broken bones  Vaginal bleeding or pelvic pain  Early labor and delivery  Tearing of the placenta (placental abruption)  Maternal death  Infant health  Low infant birth weight  Infant who needs NICU care  Broken bones  Death of   After birth  After birth, the mother may:  Have pain and other long-term health conditions  Develop postpartum depression (2 to 3 " times greater risk)  Have high-risk sexual behaviors  Use harmful substances  Have unhealthy diet and lifestyle such as eating disorders  Abuse is never OK. One in 6 abused women are first abused during pregnancy, when it s dangerous to both you and your developing baby. Recognizing that you are in an abusive relationship is the first step to help. See your healthcare provider or someone else you trust who can help you develop a safety plan.  What to expect from your healthcare provider  It can be a hard to bring up partner violence with your healthcare provider. But it s an important first step in getting help. Rest assured, your conversation is confidential. He or she is a non-judgmental health professional. He or she likely has other patients with similar problems and recognizes the difficulty of the situation. Your provider may ask you questions such as:  Do you feel safe in your relationship?  Do you have a safe place to go in an emergency?  Do conflicts ever turn into physical fights?  Answer honestly and completely. There will be no pressure to disclose the abuse or to press charges. He or she won t ask about the abuse in front of a partner, friends, or family. The goal is to help you access help when you are ready.  Call   If you feel your safety is in jeopardy now, call or the police.  For more help  If the person who hurt you is your partner or spouse and your situation can become dangerous again, it's vital to make a safety plan. The National Domestic Violence Hotline can help you develop a plan that meets your personal situation.  National Domestic Violence Hotline , www.[employer].org, 720.473.4706  [employer] last reviewed this educational content on 5/1/2020 2000-2022 [employer]. All rights reserved. This information is not intended as a substitute for professional medical care. Always follow your healthcare professional's instructions.          Bleeding During Early Pregnancy   If  you ve had bleeding early in your pregnancy, you re not alone. Many other pregnant women have early bleeding, too. And in most cases, nothing is wrong. But your healthcare provider still needs to know about it. They may want to do tests to find out why you re bleeding. Call your provider if you see bleeding during pregnancy. Tell your provider if your blood is Rh negative. Then they can figure out if you need anti-D immune globulin treatment.   What causes early bleeding?   The cause of bleeding early in pregnancy is often unknown. But many factors early on in pregnancy may lead to light bleeding (called spotting) or heavier bleeding. These include:   Having sex  When the embryo implants on the uterine wall  Bleeding between the sac membrane and the uterus (subchorionic bleeding)  Pregnancy loss (miscarriage)  The embryo implants outside of the uterus (ectopic pregnancy)  If you see spotting   Light bleeding is the most common type of bleeding in early pregnancy. If you see it, call your healthcare provider. Chances are, they will tell you that you can care for yourself at home.   If tests are needed   Depending on how much you bleed, your healthcare provider may ask you to come in for some tests. A pelvic exam, for instance, can help see how far along your pregnancy is. You also may have an ultrasound or a Doppler test. These imaging tests use sound waves to check the health of your baby. The ultrasound may be done on your belly or inside your vagina. You may also need a special blood test. This test compares your hormone levels in blood samples taken 2 days apart. The results can help your provider learn more about the implantation of the embryo. Your blood type will also need to be checked to assess if you will need to be treated for Rh sensitization.       Ultrasound can help check the health of your fetus.     Warning signs   If your bleeding doesn t stop or if you have any of the following, get medical care  right away:   Soaking a sanitary pad each hour  Bleeding like you re having a period  Cramping or severe belly pain  Feeling dizzy or faint  Tissue passing through your vagina  Bleeding at any time after the first trimester  Questions you may be asked   Bleeding early in pregnancy isn't normal. But it is common. If you ve seen any bleeding, you may be concerned. But keep in mind that bleeding alone doesn t mean something is wrong. Just be sure to call your healthcare provider right away. They may ask you questions like these to help find the cause of your bleeding:   When did your bleeding start?  Is your bleeding very light or is it like a period?  Is the blood bright red or brownish?  Have you had sex recently?  Have you had pain or cramping?  Have you felt dizzy or faint?  Monitoring your pregnancy   Bleeding will often stop as quickly as it began. Your pregnancy may go on a normal path again. You may need to make a few extra prenatal visits. But you and your baby will most likely be fine.   [employer] last reviewed this educational content on 1/1/2022 2000-2022 [employer]. All rights reserved. This information is not intended as a substitute for professional medical care. Always follow your healthcare professional's instructions.

## 2023-05-24 NOTE — PROGRESS NOTES
"  Assessment & Plan     8 weeks gestation of pregnancy  Congratulated patient on her planned pregnancy.  She was having somewhat irregular menses, early ultrasound ordered to confirm dates.  She is taking a prenatal vitamin.  She does typically have nausea in early pregnancy but has not used prescription medication in the past for this.  She will keep me posted.  Plan first OB visit in 2 to 4 weeks.  - HCG qualitative urine    Skin lesion  Likely a benign dermatofibroma based on examination today.  Discussed the option of removal, preferably after delivery.               BMI:   Estimated body mass index is 26.07 kg/m  as calculated from the following:    Height as of this encounter: 1.504 m (4' 11.21\").    Weight as of this encounter: 59 kg (130 lb).           Felicia Umana MD  [facility] STEPHANIE Dougherty is a 27 year old, presenting for the following health issues:  Pregnancy Test (4th child )        5/24/2023    11:21 AM   Additional Questions   Roomed by Kylee     History of Present Illness       Reason for visit:  1st prenatal/OB appointment and to look at a bump on my left thigh    She eats 4 or more servings of fruits and vegetables daily.She consumes 1 sweetened beverage(s) daily.She exercises with enough effort to increase her heart rate 20 to 29 minutes per day.  She exercises with enough effort to increase her heart rate 4 days per week.      Patient presents today for pregnancy confirmation.  She and her  were trying to achieve a pregnancy, however she was having irregular menses.  Her last menstrual period started on 3/29/2023 and she is sure about this date.  This lasted 4 days.  She did have her menses earlier in the month, however, on 3/4/2023.  The menses before that occurred on 1/30/2023, and she had her menses twice in the month of January as well.  Based on her LMP, she would be 8 weeks 0 days gestation with an EDC of 1/3/2024.  So far she is feeling well.  " "She denies leakage of fluid or bleeding.  She states that typically she will develop some nausea around her ninth week of gestation.  She has never needed any prescription medications for nausea.       Review of Systems   Constitutional, HEENT, cardiovascular, pulmonary, gi and gu systems are negative, except as otherwise noted.      Objective    /75 (BP Location: Left arm, Patient Position: Sitting, Cuff Size: Adult Regular)   Pulse 90   Temp 98.4  F (36.9  C) (Oral)   Resp 14   Ht 1.504 m (4' 11.21\")   Wt 59 kg (130 lb)   SpO2 100%   BMI 26.07 kg/m    Body mass index is 26.07 kg/m .  Physical Exam   GENERAL: healthy, alert and no distress  RESP: Breathing comfortably, no cough during the visit  SKIN: Small, raised flesh-colored papule right anterior thigh, dimpling with pinching  PSYCH: mentation appears normal, affect normal/bright    Results for orders placed or performed during the hospital encounter of 05/24/23   US OB <14 Weeks w Transvaginal Single     Status: None    Narrative    EXAM: US OB <14 WEEKS WITH TRANSVAGINAL SINGLE  LOCATION: [facility]  DATE/TIME: 5/24/2023 3:02 PM CDT    INDICATION: confirm dates and viability in early pregnancy  COMPARISON: Ultrasound 12/09/2021  TECHNIQUE: Transabdominal scans were performed. Endovaginal ultrasound was performed to better visualize the embryo.    FINDINGS:  UTERUS: Single normal appearing intrauterine gestation sac.  CRL: Measures 0.2 cm, equals 5 weeks and 6 days.  FETAL HEART RATE: A cardiac flicker is seen on cine imaging. However, the fetal heart cannot be assessed with M-mode imaging, likely due to small size of the fetal pole.  AMNIOTIC FLUID: Normal.  PLACENTA: Not yet formed. No evidence for sub-chorionic hemorrhage.    RIGHT OVARY: Normal.  LEFT OVARY: 1.6 cm physiologic cyst.      Impression    IMPRESSION:   1.  Single living intrauterine gestation at 5 weeks and 6 days, EDC 01/18/2024.  2.  Fetal cardiac " activity is present. However, the fetal heart rate could not be assessed, likely due to small size of the fetal pole. Recommend short interval follow-up with serial beta-hCG levels and/or ultrasound in 10-14 days.       Results for orders placed or performed in visit on 05/24/23   HCG qualitative urine     Status: Abnormal   Result Value Ref Range    hCG Urine Qualitative Positive (A) Negative

## 2023-06-08 ENCOUNTER — HOSPITAL ENCOUNTER (OUTPATIENT)
Dept: ULTRASOUND IMAGING | Facility: HOSPITAL | Age: 28
Discharge: HOME | End: 2023-06-08
Attending: FAMILY MEDICINE | Admitting: FAMILY MEDICINE
Payer: COMMERCIAL

## 2023-06-08 DIAGNOSIS — Z3A.08 8 WEEKS GESTATION OF PREGNANCY: ICD-10-CM

## 2023-06-08 PROCEDURE — 76801 OB US < 14 WKS SINGLE FETUS: CPT

## 2023-06-18 LAB
ABO/RH(D): NORMAL
ANTIBODY SCREEN: NEGATIVE
SPECIMEN EXPIRATION DATE: NORMAL

## 2023-06-19 ENCOUNTER — PRENATAL OFFICE VISIT (OUTPATIENT)
Dept: FAMILY MEDICINE | Facility: CLINIC | Age: 28
End: 2023-06-19
Payer: COMMERCIAL

## 2023-06-19 VITALS
HEART RATE: 77 BPM | OXYGEN SATURATION: 99 % | BODY MASS INDEX: 25.32 KG/M2 | WEIGHT: 125.6 LBS | SYSTOLIC BLOOD PRESSURE: 100 MMHG | RESPIRATION RATE: 16 BRPM | TEMPERATURE: 98.6 F | HEIGHT: 59 IN | DIASTOLIC BLOOD PRESSURE: 62 MMHG

## 2023-06-19 DIAGNOSIS — O34.219 PREVIOUS CESAREAN DELIVERY, ANTEPARTUM CONDITION OR COMPLICATION: ICD-10-CM

## 2023-06-19 DIAGNOSIS — O09.891 SUPERVISION OF OTHER HIGH RISK PREGNANCIES, FIRST TRIMESTER: Primary | ICD-10-CM

## 2023-06-19 LAB
ALBUMIN UR-MCNC: ABNORMAL MG/DL
APPEARANCE UR: CLEAR
BACTERIA #/AREA URNS HPF: ABNORMAL /HPF
BILIRUB UR QL STRIP: NEGATIVE
COLOR UR AUTO: YELLOW
ERYTHROCYTE [DISTWIDTH] IN BLOOD BY AUTOMATED COUNT: 11.8 % (ref 10–15)
GLUCOSE UR STRIP-MCNC: 100 MG/DL
HCT VFR BLD AUTO: 35.9 % (ref 35–47)
HGB BLD-MCNC: 12.7 G/DL (ref 11.7–15.7)
HGB UR QL STRIP: NEGATIVE
KETONES UR STRIP-MCNC: NEGATIVE MG/DL
LEUKOCYTE ESTERASE UR QL STRIP: NEGATIVE
MCH RBC QN AUTO: 31.3 PG (ref 26.5–33)
MCHC RBC AUTO-ENTMCNC: 35.4 G/DL (ref 31.5–36.5)
MCV RBC AUTO: 88 FL (ref 78–100)
NITRATE UR QL: NEGATIVE
PH UR STRIP: 7 [PH] (ref 5–8)
PLATELET # BLD AUTO: 302 10E3/UL (ref 150–450)
RBC # BLD AUTO: 4.06 10E6/UL (ref 3.8–5.2)
RBC #/AREA URNS AUTO: ABNORMAL /HPF
SP GR UR STRIP: 1.02 (ref 1–1.03)
SQUAMOUS #/AREA URNS AUTO: ABNORMAL /LPF
UROBILINOGEN UR STRIP-ACNC: 1 E.U./DL
WBC # BLD AUTO: 7.9 10E3/UL (ref 4–11)
WBC #/AREA URNS AUTO: ABNORMAL /HPF

## 2023-06-19 PROCEDURE — 99214 OFFICE O/P EST MOD 30 MIN: CPT | Mod: 25 | Performed by: FAMILY MEDICINE

## 2023-06-19 PROCEDURE — 87340 HEPATITIS B SURFACE AG IA: CPT | Performed by: FAMILY MEDICINE

## 2023-06-19 PROCEDURE — 86901 BLOOD TYPING SEROLOGIC RH(D): CPT | Performed by: FAMILY MEDICINE

## 2023-06-19 PROCEDURE — 81001 URINALYSIS AUTO W/SCOPE: CPT | Performed by: FAMILY MEDICINE

## 2023-06-19 PROCEDURE — 86850 RBC ANTIBODY SCREEN: CPT | Performed by: FAMILY MEDICINE

## 2023-06-19 PROCEDURE — 85027 COMPLETE CBC AUTOMATED: CPT | Performed by: FAMILY MEDICINE

## 2023-06-19 PROCEDURE — 87389 HIV-1 AG W/HIV-1&-2 AB AG IA: CPT | Performed by: FAMILY MEDICINE

## 2023-06-19 PROCEDURE — 87086 URINE CULTURE/COLONY COUNT: CPT | Performed by: FAMILY MEDICINE

## 2023-06-19 PROCEDURE — 36415 COLL VENOUS BLD VENIPUNCTURE: CPT | Performed by: FAMILY MEDICINE

## 2023-06-19 PROCEDURE — 87591 N.GONORRHOEAE DNA AMP PROB: CPT | Performed by: FAMILY MEDICINE

## 2023-06-19 PROCEDURE — 86803 HEPATITIS C AB TEST: CPT | Performed by: FAMILY MEDICINE

## 2023-06-19 PROCEDURE — 86900 BLOOD TYPING SEROLOGIC ABO: CPT | Performed by: FAMILY MEDICINE

## 2023-06-19 PROCEDURE — 0121A COVID-19 BIVALENT 12+ (PFIZER): CPT | Performed by: FAMILY MEDICINE

## 2023-06-19 PROCEDURE — 87491 CHLMYD TRACH DNA AMP PROBE: CPT | Performed by: FAMILY MEDICINE

## 2023-06-19 PROCEDURE — 91312 COVID-19 BIVALENT 12+ (PFIZER): CPT | Performed by: FAMILY MEDICINE

## 2023-06-19 PROCEDURE — 86762 RUBELLA ANTIBODY: CPT | Performed by: FAMILY MEDICINE

## 2023-06-19 PROCEDURE — 86780 TREPONEMA PALLIDUM: CPT | Performed by: FAMILY MEDICINE

## 2023-06-19 PROCEDURE — G0145 SCR C/V CYTO,THINLAYER,RESCR: HCPCS | Performed by: FAMILY MEDICINE

## 2023-06-19 ASSESSMENT — PAIN SCALES - GENERAL: PAINLEVEL: NO PAIN (0)

## 2023-06-19 NOTE — PROGRESS NOTES
SUBJECTIVE:     HPI:    This is a 27 year old female patient,  who presents for her first obstetrical visit.    NAHED: 2024, by Ultrasound.  She is 9w5d weeks.  Her cycles are irregular.  Her last menstrual period was normal.   Since her LMP, she has experienced  nausea, fatigue and loss of appetite).   She denies abdominal pain, vaginal discharge, dysuria and vaginal bleeding.    Additional History: Patient was having somewhat irregular menses prior to her LMP.  This was a planned pregnancy.  She has been experiencing quite a bit of nausea, is able to keep down food and fluids.  She has used B6 and Unisom with previous pregnancies and wishes to try this again initially.  She will let me know if she desires additional treatment beyond this.    Have you travelled during the pregnancy?No  Have your sexual partner(s) travelled during the pregnancy?No      HISTORY:   Planned Pregnancy: Yes  Marital Status:   Occupation: RN  Living in Household: Spouse and Children    Past History:  Her past medical history   Past Medical History:   Diagnosis Date     Oligohydramnios, third trimester, fetus 1 3/28/2016     Previous  delivery, antepartum condition or complication 3/29/2017    Delivered 14     , delivered, current hospitalization    .      She has a history of  prior  section    Since her last LMP she admits to the use of 3-4 drinks of alcohol, just after taking a negative pregnancy test.    Past medical, surgical, social and family history were reviewed and updated in [facility].      Current Outpatient Medications   Medication     prenatal vitamin iron-folic acid 27mg-0.8mg (PRENATAL S) 27 mg iron- 800 mcg Tab tablet     No current facility-administered medications for this visit.       ROS:   12 point review of systems negative other than symptoms noted below or in the HPI.      OBJECTIVE:     EXAM:  /62 (BP Location: Right arm, Patient Position: Sitting, Cuff Size: Adult  "Regular)   Pulse 77   Temp 98.6  F (37  C) (Temporal)   Resp 16   Ht 1.496 m (4' 10.9\")   Wt 57 kg (125 lb 9.6 oz)   LMP 03/29/2023 (Within Weeks)   SpO2 99%   BMI 25.46 kg/m   Body mass index is 25.46 kg/m .    GENERAL: healthy, alert and no distress  EYES: Eyes grossly normal to inspection, PERRL and conjunctivae and sclerae normal  HENT: ear canals and TM's normal, nose and mouth without ulcers or lesions  NECK: no adenopathy, no asymmetry, masses, or scars and thyroid normal to palpation  RESP: lungs clear to auscultation - no rales, rhonchi or wheezes  BREAST: normal without masses, tenderness or nipple discharge and no palpable axillary masses or adenopathy  CV: regular rate and rhythm, normal S1 S2, no S3 or S4, no murmur, click or rub, no peripheral edema and peripheral pulses strong  ABDOMEN: soft, nontender, no hepatosplenomegaly, no masses and bowel sounds normal   (female): normal female external genitalia, normal urethral meatus, vaginal mucosa pink, moist, well rugated, and normal cervix/adnexa/uterus without masses or discharge  MS: no gross musculoskeletal defects noted, no edema  SKIN: no suspicious lesions or rashes  NEURO: Normal strength and tone, mentation intact and speech normal  PSYCH: mentation appears normal, affect normal/bright    ASSESSMENT/PLAN:     1. Supervision of other high risk pregnancies, first trimester  Routine pregnancy labs updated as below including Pap smear.  Immunizations updated today, patient was due for COVID booster.  Plan next routine visit in 4 weeks.  Patient will let me know if she desires prescription medication for nausea in the interim.  We also discussed early genetic screening, she will discuss this with her .  She is low risk based on personal and family history.  - Urine Culture Aerobic Bacterial; Future  - Pap screen reflex to HPV if ASCUS - recommend age 25 - 29  - ABO/Rh type and screen  - Hepatitis B surface antigen  - CBC with " platelets  - HIV Antigen Antibody Combo  - Rubella Antibody IgG  - Treponema Abs w Reflex to RPR and Titer  - Urine Culture Aerobic Bacterial  - *UA reflex to Microscopic  - Chlamydia trachomatis PCR  - Neisseria gonorrhoeae PCR  - Hepatitis C antibody    2. Previous  delivery, antepartum condition or complication  Patient had a  section with her first pregnancy due to fetal intolerance to labor.  She has had 2 successful 's since.      27 year old , 9w5d weeks of pregnancy with NAHED of 2024, by Ultrasound    Discussed as follows: see above    Counseling given:   - Follow up in 4-6 weeks for return OB visit.  - Recommended weight gain for pregnancy: 15-25 lbs.      PLAN/PATIENT INSTRUCTIONS:    See above     Felicia Umana MD

## 2023-06-19 NOTE — PATIENT INSTRUCTIONS
"    Pregnancy: Your First Trimester Changes  The first trimester is a time of rapid development for your baby. Because your baby is growing so quickly, it is important that you start a healthy lifestyle right away. By the end of the first trimester, your baby has formed all of its major body organs and weighs just over an ounce.  Month 1 (weeks 1 to 4)  The placenta (the organ that nourishes your baby) begins to form. The brain, spinal cord, heart, gastrointestinal tract, and lungs begin to develop. Your baby is about   inch long by the end of the first month.     Actual size of baby is 1/4\".     Month 2 (weeks 5 to 8)  All of your baby s major body organs form. The face, fingers, toes, ears, and eyes appear. By the end of the month, your baby is about 1 inch long.     Actual size of baby is 1\".     Month 3 (weeks 9 to 12)  Your baby can open and close its fists and mouth. The sexual organs begin to form. As the first trimester ends, your baby is about 3 inches long.     Actual size of baby is 3\".     [employer] last reviewed this educational content on 8/1/2020 2000-2022 [employer]. All rights reserved. This information is not intended as a substitute for professional medical care. Always follow your healthcare professional's instructions.          Vitamin B6 (pyridoxine) Oral Tablet  Uses  This medicine is used for the following purposes:    metabolism disorder    nausea and vomiting    vitamin deficiency  Instructions  This medicine may be taken with or without food.  Store at room temperature away from heat, light, and moisture. Do not keep in the bathroom.  If you forget to take a dose on time, take it as soon as you remember. If it is almost time for the next dose, do not take the missed dose. Return to your normal dosing schedule. Do not take 2 doses of this medicine at one time.  Drug interactions can change how medicines work or increase risk for side effects. Tell your health care providers " about all medicines taken. Include prescription and over-the-counter medicines, vitamins, and herbal medicines. Speak with your doctor or pharmacist before starting or stopping any medicine.  Speak with your doctor or pharmacist before starting any other vitamins.  It is very important that you follow your doctor's instructions for all blood tests.  Cautions  Tell your doctor and pharmacist if you ever had an allergic reaction to a medicine.  Do not use the medication any more than instructed.  Tell the doctor or pharmacist if you are pregnant, planning to be pregnant, or breastfeeding.  Side Effects  If you have any of the following side effects, you may be getting too much medicine. Please contact your doctor to let them know about these side effects.    drowsiness or sedation    numbness or tingling in hands and feet    headaches    nausea    stomach upset or abdominal pain  This medicine usually has no side effects.  A few people may have an allergic reaction to this medicine. Symptoms can include difficulty breathing, skin rash, itching, swelling, or severe dizziness. If you notice any of these symptoms, seek medical help quickly.  Extra  Please speak with your doctor, nurse, or pharmacist if you have any questions about this medicine.  [email or web address]  IMPORTANT NOTE: This document tells you briefly how to take your medicine, but it does not tell you all there is to know about it. Your doctor or pharmacist may give you other documents about your medicine. Please talk to them if you have any questions. Always follow their advice. There is a more complete description of this medicine available in English. Scan this code on your smartphone or tablet or use the web address below. You can also ask your pharmacist for a printout. If you have any questions, please ask your pharmacist. The display and use of this drug information is subject to Terms of Use. Copyright(c) 2022 [employer]     4112-4370 [employer]. All rights reserved. This information is not intended as a substitute for professional medical care. Always follow your healthcare professional's instructions.          Adapting to Pregnancy: First Trimester  As your body adjusts during your first trimester of pregnancy, you may have to change or limit your daily activities. You ll need more rest. You may also need to use the energy you have more wisely.   Your changing body  Almost every part of your body is affected as you adapt to pregnancy. The uterus and cervix will start to soften right away. You may not look very pregnant during the first 3 months. But you are likely to have some common signs of early pregnancy:     Nausea    Fatigue    Frequent urination    Mood swings    Bloating of the belly    Constipation    Heartburn    Missed or light periods (first trimester bleeding)    Nipple or breast tenderness and breast swelling  It s not too late to start good habits   What matters most is protecting your baby from this moment on. If you smoke, drink alcohol, or use drugs, now is the time to stop. If you need help, talk with your healthcare provider:     Smoking increases the risk of stillbirth or having a low-birth-weight baby. If you smoke, quit now.    Alcohol and drugs have been linked with miscarriage, birth defects, intellectual disability, and low birth weight. Don't drink alcohol or take drugs.  Tips to relieve nausea  During pregnancy, nausea can happen at any time of the day, but it may be worse in the morning. To help prevent nausea:     Eat small, light meals at frequent intervals.    Drink fluids often.    Get up slowly. Eat a few unsalted crackers before you get out of bed.    Avoid smells that bother you.    Avoid spicy and fatty foods.    Eat an ice pop in your favorite flavor.    Get plenty of rest.    Ask your healthcare provider about taking yyaa or vitamin B6 for nausea and vomiting.    Talk  with your healthcare provider if you take vitamins that upset your stomach.   Work concerns  The end of the first trimester is a good time to discuss working during pregnancy with your employer. Follow your healthcare provider s advice if your job needs you to stand for a long time, work with hazardous tools, or even sit at a desk all day. Your workspace, workload, or scheduled hours may need to be adjusted. Perhaps you can change body postures more often or take an extra break.   Advice for travel  Talk to your healthcare provider first, but the second trimester may be the best time for any travel. You may be advised to avoid certain trips while you re pregnant. Food and water can be concerns in developing countries. Travel by car is a good choice, as you can stop, get out, and stretch. Bring snacks and water along. Fasten the lap belt below your belly, low over your hips. Also be sure to wear the shoulder harness.   Intimacy  Unless your healthcare provider tells you to, there's no reason to stop having sex while you re pregnant. You or your partner may notice changes in desire. Desire may be less in the first trimester, due to nausea and fatigue. In the second trimester, sex may be very enjoyable. The third trimester can be a challenge comfort-wise. Try different positions and see what s best for you both.   [employer] last reviewed this educational content on 4/1/2020 2000-2022 [employer]. All rights reserved. This information is not intended as a substitute for professional medical care. Always follow your healthcare professional's instructions.          Pregnancy: Common Questions  There are plenty of myths and  old wives  tales  about pregnancy. You may need help  fact from fiction. On this sheet, you ll find answers to a few common questions. If you have other questions, talk with your healthcare provider.    Will working harm my baby?  In most cases, working throughout your  pregnancy is not harmful at all. There may be concerns if the job involves dangerous machinery or chemicals, lifting, or standing for very long periods of time. Talk with your healthcare provider and employer about your particular job and pregnancy.  Is it safe to have sex during pregnancy?  Yes, unless you are specifically advised not to by your healthcare provider.  Why can t I change the cat litter box?  Cats carry a disease called toxoplasmosis. In adult humans, it shows up as a mild infection of the blood and organs. If you are infected during pregnancy, the baby s brain and eyes could be damaged. To be safe, have someone else change the litter. If you must handle it, wear a paper mask over your nose and mouth. Also, wear gloves and wash your hands afterward.  Which medicines are safe?  No prescription or over-the-counter medicine is safe for everyone all of the time. But sometimes medicines are needed. Be sure your healthcare provider knows you are pregnant. Then use only the medicines he or she advises you to take.  Is it true that I can overheat my baby?  Yes. To prevent making your baby too warm:    Don t sit in a Jacuzzi. A long, warm bath is fine, but not in water over 100 F (37.7 C).    Exercise less intensely if you feel tired. Base your workout on how you feel, not your heart rate. Heart rates aren t a good way to measure effort during pregnancy.  Can I lift and carry safely?  Yes, if your healthcare provider doesn t tell you otherwise. Learn to lift and carry safely to prevent injury and reduce back pain during pregnancy. To protect your back:    Bend at the knees to bring the load nearer.    Get a good . Test the weight of the load.    Tighten your belly. Exhale as you lift.    Lift with your legs, not with your back.    Carry the load close to your body.    Hold the load so you can see where you are going.  What if I get sick?  Most women get sick at least once during pregnancy. Talk with your  healthcare provider if you do. Most likely it will not affect your pregnancy. Get plenty of rest and fluids, and eat what you can. Talk with your provider before taking any medicines.  [employer] last reviewed this educational content on 8/1/2020 2000-2022 [employer]. All rights reserved. This information is not intended as a substitute for professional medical care. Always follow your healthcare professional's instructions.          Comfort Tips During Pregnancy  Pregnancy can bring discomfort of different kinds. Below are tips for ways to feel better. Talk with your healthcare provider before using pain-relieving medicine at any time during your pregnancy.    First trimester tips  Easing nausea    Get up slowly. Eat a few unsalted crackers before you get out of bed.    Avoid smells that bother you.    Eat small, bland, low-fat, high-protein meals at frequent intervals.    Sip on water, weak tea, or clear soft drinks, like ginger ale. Eat ice chips.    Try taking vitamin B6.  Coping with fatigue    Take catnaps when you can.    Get regular exercise.    Accept help from others.    Practice good sleep habits, like going to bed and getting up at the same time each day. Use your bed only for sleep and sex.  Calming mood swings    Talk about your feelings with others, including other mothers.    Limit sugar, chocolate, and caffeine.    Eat a healthy diet. Don t skip meals.    Get regular exercise.  Soothing headaches    Get fresh air and exercise.    Relax and get enough rest.    Check with your healthcare provider before taking any pain medicines.    Second trimester tips    To limit ankle swelling, sit with your feet raised or wear support hose.    If you have pain in your groin and stomach (round ligament pain), don't make sudden twisting movements with your body.    For leg cramps, flexing your foot often brings immediate relief. Also try massaging your calf in long, downward strokes, or stretching  your legs before going to bed. Get enough exercise and wear shoes with flexible soles. Eat foods rich in calcium.    Third trimester tips  Reducing heartburn    Eat small, light meals throughout the day rather than 3 large ones.    Sleep with your upper body raised 6 inches. Don t lie down until 2 hours after you eat.    Don't eat greasy, fried, or spicy foods.    Don't have citrus fruits or juices.  Treating constipation    Eat foods high in fiber, such as whole-grain foods, and fresh fruit and vegetables).    Drink plenty of water.    Get regular exercise.    Ask about your healthcare provider about medicines that have docusate or psyllium.  Taking care of your breasts    Don't use harsh soaps or alcohol, which can make your skin too dry.    Wear nursing bras. They provide more support than regular bras and can be used after pregnancy if you breastfeed.  Getting a good night s sleep    Take a warm shower before bed.    Sleep on a firm mattress.    Lie on your side with 1 leg crossed over the other.    Use pillows to support your arms, legs, and belly.    [employer] last reviewed this educational content on 8/1/2020 2000-2022 [employer]. All rights reserved. This information is not intended as a substitute for professional medical care. Always follow your healthcare professional's instructions.          Folic Acid Supplements  Folic acid is also called folate. It is one of the B vitamins. Nutrition experts are just starting to learn more about how folic acid helps the body. It is needed to prevent a shortage of red blood cells (a type of anemia). Experts have also found that folic acid can prevent some birth defects.     How much folic acid do you need?  Adults should have 400 mcg (micrograms) of folic acid each day. Adults may need more if they are planning to get pregnant, are pregnant, or are breastfeeding. Talk with your healthcare provider to find the right amount of folic acid for you.   Why  use a supplement?  Taking folic acid both before and during pregnancy is important. This can prevent birth defects of the spine and brain (neural tube defects). A supplement may also be helpful if you drink alcohol often. You may want to use a folic acid supplement if any of the following is true for you:   ? I am a person of childbearing age.   ? I am planning to get pregnant.  ? I rarely eat leafy green vegetables, dried beans, or lentils.   ? I rarely eat cereal and whole grains (wheat germ, brown rice, whole-wheat bread).  ? I often have more than 1 drink of alcohol a day.   If you take folic acid  Here are some tips to help you get the most from a folic acid supplement:    Read the label to be sure the product won't  soon.    Choose a supplement that provides 400 to 800 mcg of folic acid.    Store the supplement in a cool, dry place, away from sun and heat.    Eat a healthy diet to get all the nutrients your body needs.  Foods that have folic acid  Folic acid is found mainly in plants. Some good sources include:    Dark green leafy vegetables such as spinach, kale, collards, and twila lettuce    Lentils, chickpeas, and dried beans such as parsons, kidney, and black beans    Asparagus, bok arsalan, broad-beans, broccoli, and [place] sprouts    Avocados, oranges, and orange juice    Wheat germ and whole-wheat products    Products fortified with folic acid, such as cereal, pasta, bread, and rice  [employer] last reviewed this educational content on 2022-2022 [employer]. All rights reserved. This information is not intended as a substitute for professional medical care. Always follow your healthcare professional's instructions.          Anemia During Pregnancy  Anemia is a condition in which the red blood cell count is too low. In pregnant women, this is often caused by not having enough iron in the blood. Anemia is common in pregnancy and very easy to treat.   Why you need iron   While  pregnant, your body uses iron to make red blood cells for you and your baby. These cells bring oxygen to your baby and to the rest of your body. Not having enough red blood cells can cause your baby to be born too small. But this is rare, as it s easy for you to get enough iron.   Testing for anemia   The only way to know if you have anemia is to have a simple test called a CBC (complete blood count). This is a routine test that will be done at one of your first prenatal visits. This test may be done again, at about week 26 to week 28.   Treating anemia   If you have anemia due to low iron content, follow the advice of your healthcare provider. Eating foods high in iron and taking supplements can help you get the iron you need.   Eating foods high in iron      Green leafy vegetables and nuts are a good source of iron.     Eat foods that are high in iron such as:     Red meat (limit organ meats such as liver)    Seafood (be sure it s fully cooked), and don't eat fish that are high in mercury, such as swordfish, tilefish, lenin mackerel, and shark    Tofu    Eggs    Green, leafy vegetables    Whole grains and iron-fortified cereals    Dried fruits and nuts  Taking iron supplements   In most cases, a prenatal vitamin can provide enough iron. But if you need more, your healthcare provider may prescribe an iron supplement. Swallow iron pills with a glass of orange or cranberry juice. The vitamin C in these fruit juices can help your body absorb iron. But don t take your iron pills with juices that have calcium added to them. They can keep your body from absorbing the iron.   Iron supplements   Iron supplements may have certain side effects. They may cause your stools to turn black, and make you feel sick to your stomach or constipated. Here are some tips that may help you limit side effects:     Start slowly. Take 1 pill a day for a few days. Then work up to your prescribed dose over time.    Take your pills with meals,  and not at bedtime.    Increase the fiber in your diet. Eat more whole grains, fruits, and vegetables.    Do mild exercise each day.    If advised by your healthcare provider, take a stool softener.  [employer] last reviewed this educational content on 2/1/2020 2000-2022 [employer]. All rights reserved. This information is not intended as a substitute for professional medical care. Always follow your healthcare professional's instructions.          Rh-Negative Screening  If you have Rh-negative blood, your baby may be at risk for health problems. This is true only if your baby has Rh-positive blood. A simple test followed by treatment can help prevent problems.   What are the risks?  If the blood of your baby is Rh positive, your Rh-negative blood may form antibodies. These antibodies will attack the Rh-positive blood. This is called Rh disease. Rh disease can cause your baby to lose blood cells or have other health problems. Medical treatment can prevent Rh disease by keeping antibodies from forming.   How are you tested?  A simple blood test shows if you re Rh negative. This test is done very early in your pregnancy. If you re Rh negative, you ll have a second blood test near week 28 of pregnancy. This test will check whether or not your blood contains Rh antibodies.     [employer] last reviewed this educational content on 4/1/2020 2000-2022 [employer]. All rights reserved. This information is not intended as a substitute for professional medical care. Always follow your healthcare professional's instructions.          What Is Prenatal Care?  Before getting pregnant, you may have added some good health habits to get ready for your baby. But if you didn t, start today. One of the first steps is learning how to take care of yourself. See your healthcare provider as soon as you think you may be pregnant. Then continue prenatal care during your pregnancy.     Prenatal care helps you  have a healthy baby   During prenatal care:    Your healthcare provider checks the health of your pregnancy. They'll calculate a due date. This gives an estimate of your baby's delivery. Many people give birth between 38 and 41 weeks of pregnancy. Your due date is found by counting 40 weeks from the first day of your last menstrual period.    Your pregnancy's progress is checked. This includes your baby s growth, fetal heart rate, changes in your weight and blood pressure, and your overall health and comfort.    Your provider may find new concerns and manage current ones before problems happen.    Your provider will check lab work through blood and urine.    Your provider will talk about normal changes that happen during pregnancy. They'll also talk about changes that may not be normal. And they'll advise you about lifestyle changes.    Your provider will answer your questions. They'll also help you get ready for labor and delivery.  You're part of a team  When you re pregnant, you re part of a team. This team includes you, your baby, and your provider. It also may include a partner or a main support person. That could be a loved one, such as a spouse, a family member, or a friend. As you work to give your baby a healthy start, rely on your team members for support.   It s not too late to start good habits   What matters most is protecting your baby from this moment on. If you smoke, drink alcohol, or use illegal drugs, now's the time to stop. If you need help, talk with your healthcare provider.     Smoking increases the risk of losing your baby. Or of having a low-birth-weight baby. If you smoke, quit now.    Alcohol and drugs have been linked with many problems. These include miscarriage, birth defects, intellectual disability, and low birth weight. Stay away from alcohol and drugs.    Eat a healthy diet. This helps keep you and your baby strong and healthy. Follow your provider's instructions for nutrition. Also  stay within the guidelines you're given for healthy weight gain.    Take 400 micrograms to 800 micrograms (400 mcg to 800 mcg or 0.4 mg to 0.8 mg) of folic acid every day. Take it for at least 1 month before getting pregnant. And keep taking it for the first trimester of your pregnancy. This is to lower your risk of some brain and spinal birth defects. You can get folic acid from some foods. But it's hard to get all the folic acid you'll need from foods alone. Talk with your provider about taking a folic acid supplement.    Regular exercise will help you stay fit and feel good during pregnancy. It can also help prevent or reduce back pain. Talk with your provider about how to exercise safely during pregnancy.    If you have a health condition, make sure it's under control. Some conditions include asthma, diabetes, depression, high blood pressure, obesity, thyroid disease, or epilepsy. Be sure your vaccines are up to date.  How daily issues affect your health  Many things in your daily life impact your health. This can include transportation, money problems, housing, access to food, and . If you can t get to medical appointments, you may not receive the care you need. When money is tight, it may be difficult to pay for medicines. And living far from a grocery store can make it hard to buy healthy food.   If you have concerns in any of these or other areas, talk with your healthcare team. They may know of local resources to assist you. Or they may have a staff person who can help.   [employer] last reviewed this educational content on 8/1/2020 2000-2022 [employer]. All rights reserved. This information is not intended as a substitute for professional medical care. Always follow your healthcare professional's instructions.          Understanding Intimate Partner Violence  Intimate partner violence (IPV) affects hundreds of thousands of women. But the true number may be much higher because many  women may not report it. Partner violence often starts or gets worse during and after pregnancy. This may be from the stress of pregnancy and a new baby. IPV can result in pregnancy complications, poor postpartum care, and poor health of the baby.  You may feel alone if you are experiencing intimate partner violence during or after your pregnancy, but know that you re not. It s far more common than you think. And there s help and hope. Talk with your healthcare provider if you are not safe.  Types of intimate partner violence  Most people think of IPV as just physical abuse. While it does often include physical abuse, IPV can be emotional and sexual abuse. These other forms of abuse are sometimes harder to see. This is especially true for emotional abuse, because it can distort your own viewpoint.    Physical abuse includes using physical force to hurt or disable a partner. It can include throwing objects, pushing, kicking, biting, slapping, strangling, hitting, or beating.    Emotional abuse includes making threats, and controlling money or other resources. It s anything that erodes a person s sense of self-worth. It may look like name-calling, blaming, and stalking. It can also include isolation from friends, family, and even access to healthcare.    Sexual violence includes rape, unwanted kissing, and forced touching. It also includes reproductive coercion. This is holding power and control over the woman s reproductive health. It may look like efforts to sabotage contraception, having unsafe sex to purposefully expose the woman to sexually transmitted infections (STIs). It may also include forced  or injuries to cause a miscarriage.  Are you at risk for IPV?  IPV affects all genders, races, ethnicities, and social and economic statuses. But some people are at greater risk for being a victim or an abuser. Certain factors can increase the risk for IPV.  Individual factors    Having low self-esteem, being  "emotional dependent, insecure, and jealous    Having low income or being unemployed, having recent job loss or job instability    Being younger    Using alcohol or drugs    Being depressed, angry, having PTSD, or being hostile towards women    Having a history of abusing others, being abused, or witnessing abuse    Having poor problem solving skills    Having poor impulse control    Being a Native , , or  woman  Relationship factors    Marital or relationship conflict, frequent fighting    Having a jealous or possessive partner    Having control issues    Being under economic stress    Having poor social support    Having income inequality    It often helps to ask yourself these questions from the [employer] to know if you are in an abusive relationship:    Does my partner always put me down and make me feel bad about myself?    Has my partner caused harm or pain to my body?    Does my partner threaten me, the baby, my other children or himself?    Does my partner blame me for his actions? Does he tell me it's my own fault he hit me?    Is my partner becoming more violent as time goes on?    Has my partner promised never to hurt me again, but still does?  If you answered \"yes\" to any of these questions, you may be in an unhealthy relationship.  If you recognize yourself or your partner in any of these descriptors, talk with your healthcare provider to get help. If you are in danger, he or she can help you develop a safety plan.  Health effects  IPV during or after pregnancy can cause physical and emotional health effects, pregnancy complications, poor outcomes, and injury and harm to the baby after birth.  During pregnancy    Physical injuries such as bruises, cuts, or broken bones    Vaginal bleeding or pelvic pain    Early labor and delivery    Tearing of the placenta (placental abruption)    Maternal death  Infant health    Low infant birth weight    Infant who needs NICU " care    Broken bones    Death of   After birth  After birth, the mother may:    Have pain and other long-term health conditions    Develop postpartum depression (2 to 3 times greater risk)    Have high-risk sexual behaviors    Use harmful substances    Have unhealthy diet and lifestyle such as eating disorders  Abuse is never OK. One in 6 abused women are first abused during pregnancy, when it s dangerous to both you and your developing baby. Recognizing that you are in an abusive relationship is the first step to help. See your healthcare provider or someone else you trust who can help you develop a safety plan.  What to expect from your healthcare provider  It can be a hard to bring up partner violence with your healthcare provider. But it s an important first step in getting help. Rest assured, your conversation is confidential. He or she is a non-judgmental health professional. He or she likely has other patients with similar problems and recognizes the difficulty of the situation. Your provider may ask you questions such as:    Do you feel safe in your relationship?    Do you have a safe place to go in an emergency?    Do conflicts ever turn into physical fights?  Answer honestly and completely. There will be no pressure to disclose the abuse or to press charges. He or she won t ask about the abuse in front of a partner, friends, or family. The goal is to help you access help when you are ready.  Call   If you feel your safety is in jeopardy now, call or the police.  For more help  If the person who hurt you is your partner or spouse and your situation can become dangerous again, it's vital to make a safety plan. The National Domestic Violence Hotline can help you develop a plan that meets your personal situation.    National Domestic Violence Hotline , www.thepbsi.org, 781.705.5283  [employer] last reviewed this educational content on 2020-2022 [employer]. All rights  reserved. This information is not intended as a substitute for professional medical care. Always follow your healthcare professional's instructions.          Bleeding During Early Pregnancy   If you ve had bleeding early in your pregnancy, you re not alone. Many other pregnant women have early bleeding, too. And in most cases, nothing is wrong. But your healthcare provider still needs to know about it. They may want to do tests to find out why you re bleeding. Call your provider if you see bleeding during pregnancy. Tell your provider if your blood is Rh negative. Then they can figure out if you need anti-D immune globulin treatment.   What causes early bleeding?   The cause of bleeding early in pregnancy is often unknown. But many factors early on in pregnancy may lead to light bleeding (called spotting) or heavier bleeding. These include:     Having sex    When the embryo implants on the uterine wall    Bleeding between the sac membrane and the uterus (subchorionic bleeding)    Pregnancy loss (miscarriage)    The embryo implants outside of the uterus (ectopic pregnancy)  If you see spotting   Light bleeding is the most common type of bleeding in early pregnancy. If you see it, call your healthcare provider. Chances are, they will tell you that you can care for yourself at home.   If tests are needed   Depending on how much you bleed, your healthcare provider may ask you to come in for some tests. A pelvic exam, for instance, can help see how far along your pregnancy is. You also may have an ultrasound or a Doppler test. These imaging tests use sound waves to check the health of your baby. The ultrasound may be done on your belly or inside your vagina. You may also need a special blood test. This test compares your hormone levels in blood samples taken 2 days apart. The results can help your provider learn more about the implantation of the embryo. Your blood type will also need to be checked to assess if you will  need to be treated for Rh sensitization.       Ultrasound can help check the health of your fetus.     Warning signs   If your bleeding doesn t stop or if you have any of the following, get medical care right away:     Soaking a sanitary pad each hour    Bleeding like you re having a period    Cramping or severe belly pain    Feeling dizzy or faint    Tissue passing through your vagina    Bleeding at any time after the first trimester  Questions you may be asked   Bleeding early in pregnancy isn't normal. But it is common. If you ve seen any bleeding, you may be concerned. But keep in mind that bleeding alone doesn t mean something is wrong. Just be sure to call your healthcare provider right away. They may ask you questions like these to help find the cause of your bleeding:     When did your bleeding start?    Is your bleeding very light or is it like a period?    Is the blood bright red or brownish?    Have you had sex recently?    Have you had pain or cramping?    Have you felt dizzy or faint?  Monitoring your pregnancy   Bleeding will often stop as quickly as it began. Your pregnancy may go on a normal path again. You may need to make a few extra prenatal visits. But you and your baby will most likely be fine.   [employer] last reviewed this educational content on 1/1/2022 2000-2022 [employer]. All rights reserved. This information is not intended as a substitute for professional medical care. Always follow your healthcare professional's instructions.          Healthy Eating Habits During Pregnancy  It s important to develop healthy eating habits while you are pregnant, for you as well as for your baby. Here are some ways to stay healthy.   Aim for a healthy weight  A slow, steady rate of weight gain is often best. After the first trimester, you may gain about a pound a week. If you were overweight before pregnancy, you need to gain fewer pounds. Your healthcare provider can give you a healthy  weight goal for your pregnancy.   Don t diet  Now is not the time to diet. You may not get enough of the nutrients you and your baby need. Instead, learn how to be a healthy eater. Start by doing it for your baby. Soon, you may do it for yourself.   Vitamins and supplements  Talk with your healthcare provider about taking these and other prenatal vitamins and supplements.     Iron makes the extra blood you need now.    Calcium and vitamin D help build and keep strong bones.    Folic acid helps prevent certain birth defects.    Iodine helps the thyroid work right.    Some vitamins may not be safe to take. Your healthcare provider will tell you which ones to avoid.  Fluids    Drink at least 8 to 10 cups of fluid daily. Your baby needs fluids. Fluids also decrease constipation, flush out toxins and waste, limit swelling, and help prevent bladder infections. Water is best. Other good choices are:     Water or seltzer water with a slice of lemon or lime. (These can also help ease an upset stomach.)    Clear soups that are low in salt    Low-fat or fat-free milk, soy or rice milk with calcium added    Popsicles or gelatin  Things to avoid  Some things might harm your growing baby. Don t eat or drink:     Alcohol    Unpasteurized dairy foods and juices    Raw or undercooked meat, poultry, fish, or eggs    Unwashed fruits and vegetables    Prepared meats, like deli meats or hot dogs, unless heated until steaming hot    Fish that are high in mercury, like shark, swordfish, lenin mackerel, tilefish, and albacore tuna  Things to limit  Ask your healthcare provider whether it s safe to eat or drink:     Caffeine    Artificial sweeteners    Organ meats    Certain types of fish    Fish and shellfish that contain mercury in lower amounts, like shrimp, canned light tuna, salmon, pollock, and catfish  [employer] last reviewed this educational content on 7/1/2021 2000-2022 [employer]. All rights reserved. This  information is not intended as a substitute for professional medical care. Always follow your healthcare professional's instructions.          Pregnancy: Planning Your Exercise Routine  While you re pregnant, an exercise routine helps both your mind and your body feel good. It tones your muscles and makes them stronger. It also gives you and your baby more oxygen.   The right exercise for you    Overall conditioning is best for you and your baby. Try walking, swimming, or riding a stationary bike. Always warm up, cool down, and drink enough fluids. Keep a snack close by in case your blood sugar gets low. Discuss exercise choices with your healthcare provider. Talk about the following:     If you already exercise, find out how to adapt your routine while you re pregnant. Keep the intensity of the exercise moderate. As your pregnancy progresses, your center of gravity will change. Be careful to keep your balance.    Ask if there are any local prenatal exercise classes, such as yoga or water aerobics. Find out which prenatal exercise videos are good choices.    If you were not exercising before your pregnancy, find out the best way to start. Now is not the time to begin a new workout on your own. Start slowly. Listen to your body.    Ask which forms of exercise you should avoid. These may include risky activities like hot yoga, horseback riding, scuba diving, skiing, skating, and contact sports.  Pelvic tilts  These help strengthen your stomach muscles and low back. You can do pelvic tilts instead of sit-ups.     Do this exercise on your hands and knees.    Relax the back of your neck. Pull your stomach in until your low back flattens.    Hold for 30 seconds. Release. Repeat 10 times. Do this twice a day.  Kegel exercises  Kegel exercises strengthen the pelvic muscles. Doing Kegels daily helps prepare these muscles for delivery. Kegels also help ease your recovery. You exercise these muscles by tightening, holding,  then relaxing them. To do 1 type of Kegel exercise, contract as if you were stopping your urine stream (but do it when you re not urinating). Hold for 10 seconds, then repeat 10 times, a few times a day.   Tips to add activity  Here are some tips to follow:    Park the car farther from a store and walk.    If you can, do errands on foot instead of driving.    Walk across the office to talk to someone in person instead of calling.    While waiting for appointments, go up and down stairs or around the block.  Tips to stay active  Here are some tips to follow:    Maintain your routine. But exercise less intensely if you feel tired.    Base your workout on how you feel, not your heart rate. Heart rates aren t a good way to measure effort during pregnancy.    Don't exercise on your back after week 16.  What are the warning signs that I should stop exercising?  Stop exercising and call your healthcare provider if you have any of these symptoms:    Vaginal bleeding     Dizziness or feeling faint     Increased shortness of breath     Chest pain     Headache     Muscle weakness     Calf (back of the leg) pain or swelling      Uterine contractions or  labor     Decreased fetal movement     Fluid leaking (or gushing) from your vagina  [employer] last reviewed this educational content on 2021-2022 [employer]. All rights reserved. This information is not intended as a substitute for professional medical care. Always follow your healthcare professional's instructions.          Nutrition During Pregnancy   Having a healthy baby depends mostly on you. What you eat matters to your baby and your health. During pregnancy, you will likely need about 300 more calories per day than before you became pregnant. Each day, try to eat the number of servings listed here for each food group. In addition, cut down on salt and caffeine. Limit the amount of sweets and high-fat foods you eat. Don t smoke or drink  alcohol.     Important: See your healthcare provider as often as requested. If you have any questions, be sure to ask them.   Fruits Vegetables Grains & Cereals* Fats & Oils   2 cups  Examples of 1-cup servings:   1 medium apple  1 medium orange  1 medium banana  1 cup chopped fruit  1 cup 100% fruit juice (pasteurized)   1/2 cup dried fruit 2-1/2 to 3 cups   Examples of 1 servin cups raw, leafy greens   1 cup raw or cooked cut-up vegetables   1 cup 100% vegetable juice (pasteurized)  6 to 8 ounces  Examples of 1-ounce servings:   1 slice bread  1/2 cup cooked rice  1/2 cup cooked cereal   1/2 cup pasta  1 ounce cold cereal 6 to 8 teaspoons   Dairy** Protein--- Fluids      3 cups  Examples of 1-cup servings:   1 cup milk  1 cup yogurt  1-1/2 ounces natural cheese   2 ounces processed cheese  5 to 6-1/2 ounces  Examples of 1-ounce servings:   1 egg  1 ounce of lean meat, poultry, or fish   1/4 cup cooked beans   1 tablespoon peanut butter   1/2 ounce nuts 8 or more 8-ounce glasses   Examples:  Water  Mineral water  Clear soups, broth      *Note: Choose whole grains whenever possible.   ** Note: Try to choose low-fat options; stay away from soft cheeses and unpasteurized milk.   --- Notes: Don't eat raw or undercooked meats, eggs, seafood, fish, and shellfish. Also, some types of fish, such as shark, swordfish, and lenin mackerel, should not be eaten during pregnancy. Don't eat hot dogs, lunch meats, or cold cuts unless heated to steaming just before being served. Ask your healthcare provider about safe choices.   Prenatal supplements  A prenatal supplement is a pill that you take daily during pregnancy. It helps make sure you re getting the right amount of certain nutrients that are important to your baby. Ask your healthcare provider to help you choose the best one for you. Important nutrients during pregnancy include:     Folic acid. It's best to start taking this supplement 1 month before you start trying to  get pregnant. Folic acid helps prevent certain problems in your baby. During pregnancy, you need to take 400 micrograms (mcg) of folic acid every day for the first 2 to 3 months after conception. After that, 600 mcg is needed for a growing baby and placenta.    Iron, calcium, and vitamin D. You may also be advised to take these supplements during pregnancy. They help keep you and your baby healthy. Take them at different times because calcium makes it hard for the body to absorb iron. Taking iron with orange juice helps to increase its absorption.  [employer] last reviewed this educational content on 8/1/2020 2000-2022 [employer]. All rights reserved. This information is not intended as a substitute for professional medical care. Always follow your healthcare professional's instructions.          Pregnancy: Your Weight  Being a healthy weight is important for both you and your baby. The weight you gain now is not just extra fat. It is also the weight of your baby. And it is the increased blood and fluids to support the baby. A slow, steady rate of gain is best. How much you should gain depends on your weight before getting pregnant. Check with your healthcare provider to find out what is right for you.      Your weight will be checked regularly by your healthcare provider.     Talk to your healthcare provider if you have any questions.   If you gain too much  Gaining too much weight might cause you to feel tired, or you could have a harder pregnancy or birth. If you and your healthcare provider decide you re gaining too much:     Eat fewer fats and sugars. Instead, eat fruit, vegetables, and whole-grain foods.    Drink plenty of water between meals.    Get at least 20 minutes of light exercise, like walking, each day.    Don t diet. You might not get enough of the nutrients you and your baby need.    Keep a food diary to help you gauge what and how much you are eating.  If you're not gaining  enough  If you don t gain enough, your baby could be too small or have health problems. Women tend to gain most of their weight in the second and third trimesters. For now:     Eat many types of foods. Make sure you get enough calcium, protein, and carbohydrates.    Don t skip meals.    Eat healthy snacks.    Pick nutrient-dense, high-calorie healthy food like trail mix or protein shakes.    See a dietitian for help.    Talk to your healthcare provider if you have had an eating disorder or problems with certain foods.  The following are ways to get more calories:    Eat breakfast every day. Peanut butter or a slice of cheese on toast can give you an extra protein boost.    Snack between meals. Yogurt and dried fruits can provide protein, calcium, and minerals.    Try to eat more foods that are high in good fats, like nuts, fatty fish, avocados, and olive oil.    Drink juices made from real fruit that are high in vitamin C or beta-carotene, like grapefruit juice, orange juice, papaya nectar, apricot nectar, and carrot juice.    Don't eat junk food, like foods high in sugar.  [employer] last reviewed this educational content on 7/1/2021 2000-2022 [employer]. All rights reserved. This information is not intended as a substitute for professional medical care. Always follow your healthcare professional's instructions.        You have been provided the CDC Warning Signs in Pregnancy document.    Additional copies can be found here: [email or web address]

## 2023-06-20 LAB
C TRACH DNA SPEC QL NAA+PROBE: NEGATIVE
HBV SURFACE AG SERPL QL IA: NONREACTIVE
HCV AB SERPL QL IA: NONREACTIVE
HIV 1+2 AB+HIV1 P24 AG SERPL QL IA: NONREACTIVE
N GONORRHOEA DNA SPEC QL NAA+PROBE: NEGATIVE
RUBV IGG SERPL QL IA: 3.14 INDEX
RUBV IGG SERPL QL IA: POSITIVE
T PALLIDUM AB SER QL: NONREACTIVE

## 2023-06-21 LAB
BACTERIA UR CULT: NO GROWTH
BKR LAB AP GYN ADEQUACY: NORMAL
BKR LAB AP GYN INTERPRETATION: NORMAL
BKR LAB AP HPV REFLEX: NORMAL
BKR LAB AP LMP: NORMAL
BKR LAB AP PREVIOUS ABNORMAL: NORMAL
PATH REPORT.COMMENTS IMP SPEC: NORMAL
PATH REPORT.COMMENTS IMP SPEC: NORMAL
PATH REPORT.RELEVANT HX SPEC: NORMAL

## 2023-07-20 ENCOUNTER — PRENATAL OFFICE VISIT (OUTPATIENT)
Dept: FAMILY MEDICINE | Facility: CLINIC | Age: 28
End: 2023-07-20
Payer: COMMERCIAL

## 2023-07-20 VITALS
RESPIRATION RATE: 16 BRPM | DIASTOLIC BLOOD PRESSURE: 85 MMHG | TEMPERATURE: 98.6 F | HEART RATE: 76 BPM | SYSTOLIC BLOOD PRESSURE: 115 MMHG | BODY MASS INDEX: 25.6 KG/M2 | HEIGHT: 59 IN | WEIGHT: 127 LBS

## 2023-07-20 DIAGNOSIS — O09.92 SUPERVISION OF HIGH RISK PREGNANCY IN SECOND TRIMESTER: Primary | ICD-10-CM

## 2023-07-20 DIAGNOSIS — O26.812 PREGNANCY RELATED FATIGUE IN SECOND TRIMESTER: ICD-10-CM

## 2023-07-20 DIAGNOSIS — O34.219 PREVIOUS CESAREAN DELIVERY, ANTEPARTUM CONDITION OR COMPLICATION: ICD-10-CM

## 2023-07-20 LAB — TSH SERPL DL<=0.005 MIU/L-ACNC: 3.75 UIU/ML (ref 0.3–4.2)

## 2023-07-20 PROCEDURE — 36415 COLL VENOUS BLD VENIPUNCTURE: CPT | Performed by: FAMILY MEDICINE

## 2023-07-20 PROCEDURE — 99207 PR PRENATAL VISIT: CPT | Performed by: FAMILY MEDICINE

## 2023-07-20 PROCEDURE — 84443 ASSAY THYROID STIM HORMONE: CPT | Performed by: FAMILY MEDICINE

## 2023-07-20 NOTE — PATIENT INSTRUCTIONS
Weeks 10 to 14 of Your Pregnancy: Care Instructions  It's now possible to hear the fetus's heartbeat with a special ultrasound device. And the fetus's organs are developing.    Decide about tests to check for birth defects. Think about your age, your chance of passing on a family disease, your need to know about any problems, and what you might do after you have the test results.   It's okay to exercise. Try activities such as walking or swimming. Check with your doctor before starting a new program.     You may feel more tired than usual.  Taking naps during the day may help.     You may feel emotional.  It might help to talk to someone.     You may have headaches.  Try lying down and putting a cool cloth over your forehead.     You can use acetaminophen (Tylenol) for pain relief.  Don't take any anti-inflammatory medicines (such as Advil, Motrin, Aleve), unless your doctor says it's okay.     You may feel a fullness or aching in your lower belly.  This can feel like the kind of cramps you might get before a period. A back rub may help.     You may need to urinate more.  Your growing uterus and changing hormones can affect your bladder.     You may feel sick to your stomach (morning sickness).  Try avoiding food and smells that make you feel sick.     Your breasts may feel different.  They may feel tender or get bigger. Your nipples may get darker. Try a bra that gives you good support.     Avoid alcohol, tobacco, and drugs (including marijuana).  If you need help quitting, talk to your doctor.     Take a daily prenatal vitamin.  Choose one with folic acid.   Follow-up care is a key part of your treatment and safety. Be sure to make and go to all appointments, and call your doctor if you are having problems. It's also a good idea to know your test results and keep a list of the medicines you take.  Where can you learn more?  Go to https://www.healthwise.net/patiented  Enter E090 in the search box to learn more  "about \"Weeks 10 to 14 of Your Pregnancy: Care Instructions.\"  Current as of: November 9, 2022               Content Version: 13.7 2006-2023 [employer].   Care instructions adapted under license by your healthcare professional. If you have questions about a medical condition or this instruction, always ask your healthcare professional. [employer] disclaims any warranty or liability for your use of this information.    Understanding Alpha and Beta Thalassemia  What is thalassemia?  Thalassemia [[facility]-elsie-SEE-noman-] is a lifelong blood disorder. It is caused by mutations (changes) in the genes that make hemoglobin.   Hemoglobin is a protein in red blood cells that carries oxygen. Hemoglobin is made of 4 smaller protein chains: 2 blocks of alpha chains and 2 blocks of beta chains (see Figure 1). Each block has heme (iron) in the center. Oxygen sticks to the heme, allowing your body to carry it through the bloodstream. The oxygen is delivered to your whole body.  When you have alpha thalassemia, your alpha blocks are smaller or are missing one or both alpha chains. (See Figure 2 for an example.) For beta thalassemia, the beta blocks are smaller or fewer in number.   With either disorder, your body makes smaller hemoglobin and possibly fewer red blood cells to hold hemoglobin (anemia). You may feel very tired or have other problems as a result.  How do you get thalassemia?  There are 4 genes for alpha thalassemia and 2 genes for beta thalassemia. For you to have either alpha or beta thalassemia, both of your parents must carry a gene mutation for the disease and pass it down to you.   It's possible to have more severe or less severe symptoms than your parents. If you get a mutation from only one parent, for example, you won't have symptoms of thalassemia (thalassemia trait)--but you could still pass the mutation to your children.  Types of thalassemias  Some types of thalassemia have fewer " symptoms than others. How severe the thalassemia is depends on how many mutated genes you have inherited and how many alpha or beta blocks are affected.   Alpha Thalassemia    Silent carrier (1 alpha mutation): People with this type have no symptoms and often do not know they have thalassemia.    Alpha thalassemia trait (2 alpha mutations): Some people with this trait may have mild anemia, but they often feel well.    Hemoglobin H disease (3 alpha mutations): People with this disorder have anemia more often. They sometimes need blood transfusions, but can have a normal life span.    Hemoglobin Barts (4 alpha mutations):  With this type, no alpha blocks are made. Severe problems occur during the mother's pregnancy and newborns rarely survive.  Beta Thalassemia    Beta thalassemia trait (1 beta mutation): People with this trait (also called beta thalassemia minor) have red blood cells that are small. Mild anemia can occur, but it is rare.    Beta thalassemia intermedia (2 beta mutations): In this type, both beta genes are abnormal, but these mutations may be mild. More severe types sometimes need blood transfusions to treat anemia and medicine to treat iron buildup. Patients have normal life spans.    Beta thalassemia major (2 severe beta mutations): This is the most severe form of beta thalassemia. Both beta genes are abnormal, causing little to no beta blocks to be made. Patients often need medicine to reduce iron buildup, as well as monthly blood transfusions to stay healthy. The only cure at this time is a bone marrow transplant. More options are still being studied.  Treatment and outcomes    Mild types: People with a mild type of alpha or beta thalassemia rarely need treatment. Some need folic acid to help make more red blood cells. Most have normal life spans.    Moderate to severe types: Patients with these types have a higher risk for reduced growth, early bone thinning and pregnancy problems.    Most severe  types: These patients often need regular blood transfusions, even if not sick. It is common to have iron build up in your body, so medicine may be needed to reduce the buildup. If left untreated, too much iron, especially in the liver and heart, can lead to premature death.  Outcomes for patients with alpha or beta thalassemia are usually very good in developed countries like the [place]. Survival rates higher than 90% have been reported for children.   For informational purposes only. Not to replace the advice of your health care provider. Copyright   2021 [facility]. All rights reserved. Clinically reviewed by Adeel Hernandez MD, Hematology. Whisper [id number] - REV 08/21.  Nutrition During Pregnancy: Care Instructions  Overview     Healthy eating when you are pregnant is important for you and your baby. It can help you feel well and have a successful pregnancy and delivery. During pregnancy your nutrition needs increase. Even if you have excellent eating habits, your doctor may recommend a multivitamin to make sure you get enough iron and folic acid.  You may wonder how much weight you should gain. In general, if you were at a healthy weight before you became pregnant, then you should gain between 25 and 35 pounds. If you were overweight before pregnancy, then you'll likely be advised to gain 15 to 25 pounds. If you were underweight before pregnancy, then you'll probably be advised to gain 28 to 40 pounds. Your doctor will work with you to set a weight goal that is right for you. Gaining a healthy amount of weight helps you have a healthy baby.  Follow-up care is a key part of your treatment and safety. Be sure to make and go to all appointments, and call your doctor if you are having problems. It's also a good idea to know your test results and keep a list of the medicines you take.  How can you care for yourself at home?    Eat plenty of fruits and vegetables. Include a variety of  orange, yellow, and leafy dark-green vegetables every day.    Choose whole-grain bread, cereal, and pasta. Good choices include whole wheat bread, whole wheat pasta, brown rice, and oatmeal.    Get 4 or more servings of milk and milk products each day. Good choices include nonfat or low-fat milk, yogurt, and cheese. If you cannot eat milk products, you can get calcium from calcium-fortified products such as orange juice, soy milk, and tofu. Other non-milk sources of calcium include leafy green vegetables, such as broccoli, kale, mustard greens, turnip greens, bok arsalan, and brussels sprouts.    If you eat meat, pick lower-fat types. Good choices include lean cuts of meat and chicken or turkey without the skin.    Do not eat shark, swordfish, lenin mackerel, or tilefish. They have high levels of mercury, which is dangerous to your baby. You can eat up to 12 ounces a week of fish or shellfish that have low mercury levels. Good choices include shrimp, wild salmon, pollock, and catfish. Limit some other types of fish, such as white (albacore) tuna, to 4 oz (0.1 kg) a week.    Heat lunch meats (such as turkey, ham, or bologna) to 165 F before you eat them. This reduces your risk of getting sick from a kind of bacteria that can be found in lunch meats.    Do not eat unpasteurized soft cheeses, such as brie, feta, fresh mozzarella, and blue cheese. They have a bacteria that could harm your baby.    Limit caffeine. If you drink coffee or tea, have no more than 1 cup a day. Caffeine is also found in alexandru.    Do not drink any alcohol. No amount of alcohol has been found to be safe during pregnancy.    Do not diet or try to lose weight. For example, do not follow a low-carbohydrate diet. If you are overweight at the start of your pregnancy, your doctor will work with you to manage your weight gain.    Tell your doctor about all vitamins and supplements you take.  When should you call for help?  Watch closely for changes in your  "health, and be sure to contact your doctor if you have any problems.  Where can you learn more?  Go to https://www.[employer].net/patiented  Enter Y785 in the search box to learn more about \"Nutrition During Pregnancy: Care Instructions.\"  Current as of: March 1, 2023               Content Version: 13.7    9274-1286 [employer].   Care instructions adapted under license by your healthcare professional. If you have questions about a medical condition or this instruction, always ask your healthcare professional. [employer] disclaims any warranty or liability for your use of this information.    Exercise During Pregnancy: Care Instructions  Your Care Instructions     Exercise is good for healthy pregnant women. It can relieve back pain, swelling, and other discomforts of pregnancy. It also prepares your muscles for childbirth. And exercise can improve your energy level and help you sleep better.  If your doctor recommends it, get more exercise. Walking is a good choice. Bit by bit, increase the amount you walk every day. Try for at least 30 minutes on most days of the week. But if you do not already exercise, be sure to talk with your doctor before you start a new exercise program. Try exercise classes for pregnant women. Doctors do not recommend contact sports during pregnancy.  Follow-up care is a key part of your treatment and safety. Be sure to make and go to all appointments, and call your doctor if you are having problems. It's also a good idea to know your test results and keep a list of the medicines you take.  How can you care for yourself at home?    Talk with your doctor about the right kind of exercise for each stage of pregnancy.    Listen to your body to know if your exercise is at a safe level.  ? Do not become overheated while you exercise. High body temperature can be harmful to your baby. Avoid activities that can make your body too hot.  ? If you feel tired, take it easy. " "You might walk instead of run.  ? If you are used to strenuous exercise, pay attention to changes in your body that mean it is time to slow down.    If you exercised before getting pregnant, you should be able to keep up your routine early in your pregnancy. That might include running and aerobics. Later, you may want to switch to swimming or walking.    Eat a small snack or drink juice 15 to 30 minutes before you exercise.    Eat a healthy diet. Make sure it includes plenty of beans, peas, and leafy green vegetables. You may need to increase how much you eat to get extra energy for exercise.    Drink plenty of fluids before, during, and after exercise.    Avoid contact sports, such as soccer and basketball. Also avoid scuba diving, exercise in high altitude (above 6,000 feet), and horseback riding.    Do not get overtired while you exercise. You should be able to talk while you work out.    After your fourth month of pregnancy, avoid exercises (such as sit-ups and some yoga poses) that require you to lie flat on your back on a hard surface.    Try swimming and brisk walking during all your pregnancy.    Get plenty of rest. You may be very tired while you are pregnant.  Where can you learn more?  Go to https://www.[employer].net/patiented  Enter S801 in the search box to learn more about \"Exercise During Pregnancy: Care Instructions.\"  Current as of: November 9, 2022               Content Version: 13.7    2555-6345 [employer].   Care instructions adapted under license by your healthcare professional. If you have questions about a medical condition or this instruction, always ask your healthcare professional. [employer] disclaims any warranty or liability for your use of this information.    Learning About Pregnancy and Obesity  How does your weight affect your pregnancy?     The basics of prenatal care are the same for everyone, regardless of size. You'll get what you need to have a " "healthy baby.  But your size can make a difference in a few things. You and your doctor will have to watch your pregnancy weight. Your weight may affect your labor and delivery.  You may have some extra doctor visits and tests. And you may have some tests earlier in your pregnancy. You'll need to pay close attention to things like blood pressure and the chance of getting gestational diabetes. (This is a type of diabetes that sometimes happens during pregnancy.) And close attention will be given to your developing baby.  Work with your doctor to get the care you need. Go to all your doctor visits, and follow your doctor's advice about what to do and what to avoid during pregnancy.  How much weight gain is healthy?  If you are very overweight (obese), experts recommend that you gain between 11 and 20 pounds. Your doctor will work with you to set a weight goal that's right for you. In some cases, your doctor may recommend that you not gain any weight.  How much extra food do you need to eat?  Although you may joke that you're \"eating for two\" during pregnancy, you don't need to eat twice as much food. How much you can eat depends on:    Your height.    How much you weigh when you get pregnant.    How active you are.    How many babies you're carrying.  In the first trimester, you'll probably need the same amount of calories as you did before you were pregnant. In general, in your second trimester, you need to eat about 340 extra calories a day. In your third trimester, you need to eat about 450 extra calories a day.  You can get about 340 calories in a peanut butter sandwich. Having a cup of 1% milk with a peanut butter sandwich is about 450 calories.  What can you do to have a healthy pregnancy?  The best things you can do for you and your baby are to eat healthy foods, get regular exercise, avoid alcohol and smoking, and go to your doctor visits.    Eat a variety of foods from all the food groups. Make sure to get " "enough calcium and folic acid.    You may want to work with a dietitian to help you plan healthy meals to get the right amount of calories for you.    If you didn't exercise much before you got pregnant, talk to your doctor about how you can slowly get more active. Your doctor may want to set up an exercise program with you.  Where can you learn more?  Go to https://www.[employer].net/patiented  Enter B644 in the search box to learn more about \"Learning About Pregnancy and Obesity.\"  Current as of: November 9, 2022               Content Version: 13.7    6099-8514 [employer].   Care instructions adapted under license by your healthcare professional. If you have questions about a medical condition or this instruction, always ask your healthcare professional. [employer] disclaims any warranty or liability for your use of this information.      Healthy Eating Habits During Pregnancy  It s important to develop healthy eating habits while you are pregnant, for you as well as for your baby. Here are some ways to stay healthy.   Aim for a healthy weight  A slow, steady rate of weight gain is often best. After the first trimester, you may gain about a pound a week. If you were overweight before pregnancy, you need to gain fewer pounds. Your healthcare provider can give you a healthy weight goal for your pregnancy.   Don t diet  Now is not the time to diet. You may not get enough of the nutrients you and your baby need. Instead, learn how to be a healthy eater. Start by doing it for your baby. Soon, you may do it for yourself.   Vitamins and supplements  Talk with your healthcare provider about taking these and other prenatal vitamins and supplements.     Iron makes the extra blood you need now.    Calcium and vitamin D help build and keep strong bones.    Folic acid helps prevent certain birth defects.    Iodine helps the thyroid work right.    Some vitamins may not be safe to take. Your " healthcare provider will tell you which ones to avoid.  Fluids    Drink at least 8 to 10 cups of fluid daily. Your baby needs fluids. Fluids also decrease constipation, flush out toxins and waste, limit swelling, and help prevent bladder infections. Water is best. Other good choices are:     Water or seltzer water with a slice of lemon or lime. (These can also help ease an upset stomach.)    Clear soups that are low in salt    Low-fat or fat-free milk, soy or rice milk with calcium added    Popsicles or gelatin  Things to avoid  Some things might harm your growing baby. Don t eat or drink:     Alcohol    Unpasteurized dairy foods and juices    Raw or undercooked meat, poultry, fish, or eggs    Unwashed fruits and vegetables    Prepared meats, like deli meats or hot dogs, unless heated until steaming hot    Fish that are high in mercury, like shark, swordfish, lenin mackerel, tilefish, and albacore tuna  Things to limit  Ask your healthcare provider whether it s safe to eat or drink:     Caffeine    Artificial sweeteners    Organ meats    Certain types of fish    Fish and shellfish that contain mercury in lower amounts, like shrimp, canned light tuna, salmon, pollock, and catfish  [employer] last reviewed this educational content on 7/1/2021 2000-2023 [employer]. All rights reserved. This information is not intended as a substitute for professional medical care. Always follow your healthcare professional's instructions.          Pregnancy: Planning Your Exercise Routine  While you re pregnant, an exercise routine helps both your mind and your body feel good. It tones your muscles and makes them stronger. It also gives you and your baby more oxygen.   The right exercise for you    Overall conditioning is best for you and your baby. Try walking, swimming, or riding a stationary bike. Always warm up, cool down, and drink enough fluids. Keep a snack close by in case your blood sugar gets low. Discuss  exercise choices with your healthcare provider. Talk about the following:     If you already exercise, find out how to adapt your routine while you re pregnant. Keep the intensity of the exercise moderate. As your pregnancy progresses, your center of gravity will change. Be careful to keep your balance.    Ask if there are any local prenatal exercise classes, such as yoga or water aerobics. Find out which prenatal exercise videos are good choices.    If you were not exercising before your pregnancy, find out the best way to start. Now is not the time to begin a new workout on your own. Start slowly. Listen to your body.    Ask which forms of exercise you should avoid. These may include risky activities like hot yoga, horseback riding, scuba diving, skiing, skating, and contact sports.  Pelvic tilts  These help strengthen your stomach muscles and low back. You can do pelvic tilts instead of sit-ups.     Do this exercise on your hands and knees.    Relax the back of your neck. Pull your stomach in until your low back flattens.    Hold for 30 seconds. Release. Repeat 10 times. Do this twice a day.  Kegel exercises  Kegel exercises strengthen the pelvic muscles. Doing Kegels daily helps prepare these muscles for delivery. Kegels also help ease your recovery. You exercise these muscles by tightening, holding, then relaxing them. To do 1 type of Kegel exercise, contract as if you were stopping your urine stream (but do it when you re not urinating). Hold for 10 seconds, then repeat 10 times, a few times a day.   Tips to add activity  Here are some tips to follow:    Park the car farther from a store and walk.    If you can, do errands on foot instead of driving.    Walk across the office to talk to someone in person instead of calling.    While waiting for appointments, go up and down stairs or around the block.  Tips to stay active  Here are some tips to follow:    Maintain your routine. But exercise less intensely if  you feel tired.    Base your workout on how you feel, not your heart rate. Heart rates aren t a good way to measure effort during pregnancy.    Don't exercise on your back after week 16.  What are the warning signs that I should stop exercising?  Stop exercising and call your healthcare provider if you have any of these symptoms:    Vaginal bleeding     Dizziness or feeling faint     Increased shortness of breath     Chest pain     Headache     Muscle weakness     Calf (back of the leg) pain or swelling      Uterine contractions or  labor     Decreased fetal movement     Fluid leaking (or gushing) from your vagina  [employer] last reviewed this educational content on 2021-2023 [employer]. All rights reserved. This information is not intended as a substitute for professional medical care. Always follow your healthcare professional's instructions.          Nutrition During Pregnancy   Having a healthy baby depends mostly on you. What you eat matters to your baby and your health. During pregnancy, you will likely need about 300 more calories per day than before you became pregnant. Each day, try to eat the number of servings listed here for each food group. In addition, cut down on salt and caffeine. Limit the amount of sweets and high-fat foods you eat. Don t smoke or drink alcohol.     Important: See your healthcare provider as often as asked. If you have any questions, be sure to ask them.   Fruits Vegetables Grains & Cereals* Fats & Oils   2 cups  Examples of 1-cup servings:   1 medium apple  1 medium orange  1 medium banana  1 cup chopped fruit  1 cup 100% fruit juice (pasteurized)   1/2 cup dried fruit 2-1/2 to 3 cups   Examples of 1 servin cups raw, leafy greens   1 cup raw or cooked cut-up vegetables   1 cup 100% vegetable juice (pasteurized)  6 to 8 ounces  Examples of 1-ounce servings:   1 slice bread  1/2 cup cooked rice  1/2 cup cooked cereal   1/2 cup pasta  1 ounce  cold cereal 6 to 8 teaspoons   Dairy** Protein--- Fluids      3 cups  Examples of 1-cup servings:   1 cup milk  1 cup yogurt  1-1/2 ounces natural cheese   2 ounces processed cheese  5 to 6-1/2 ounces  Examples of 1-ounce servings:   1 egg  1 ounce of lean meat, poultry, or fish   1/4 cup cooked beans   1 tablespoon peanut butter   1/2 ounce nuts 8 or more 8-ounce glasses   Examples:  Water  Mineral water  Clear soups, broth      *Note: Choose whole grains whenever possible.   ** Note: Try to choose low-fat foods; stay away from soft cheeses and unpasteurized milk.   --- Notes: Don't eat raw or undercooked meats, eggs, seafood, fish, or shellfish. Some types of fish, such as shark, swordfish, and lenin mackerel, should not be eaten during pregnancy. Don't eat hot dogs, lunch meats, or cold cuts unless heated to steaming just before being served. Ask your healthcare provider about safe choices.   Prenatal supplements  A prenatal supplement is a pill that you take daily during pregnancy. It helps make sure you re getting the right amount of certain nutrients that are important to your baby. Ask your healthcare provider to help you choose the best one for you. Important nutrients during pregnancy include:     Folic acid. It's best to start taking this supplement 1 month before you start trying to get pregnant. Folic acid helps prevent certain problems in your baby. During pregnancy, you need to take 400 micrograms (mcg) of folic acid every day for the first 2 to 3 months after conception. After that, 600 mcg is needed for a growing baby and placenta.    Iron, calcium, and vitamin D. You may also be advised to take these supplements during pregnancy. They help keep you and your baby healthy. Take them at different times because calcium makes it hard for the body to absorb iron. Taking iron with orange juice helps to increase its absorption.  [employer] last reviewed this educational content on 12/1/2022 2000-2023 [employer]  [employer]. All rights reserved. This information is not intended as a substitute for professional medical care. Always follow your healthcare professional's instructions.          Pregnancy: Your Weight  Being a healthy weight is important for both you and your baby. The weight you gain now is not just extra fat. It is also the weight of your baby. And it is the increased blood and fluids to support the baby. A slow, steady rate of gain is best. How much you should gain depends on your weight before getting pregnant. Check with your healthcare provider to find out what is right for you.      Your weight will be checked regularly by your healthcare provider.     Talk to your healthcare provider if you have any questions.   If you gain too much  Gaining too much weight might cause you to feel tired, or you could have a harder pregnancy or birth. If you and your healthcare provider decide you re gaining too much:     Eat fewer fats and sugars. Instead, eat fruit, vegetables, and whole-grain foods.    Drink plenty of water between meals.    Get at least 20 minutes of light exercise, like walking, each day.    Don t diet. You might not get enough of the nutrients you and your baby need.    Keep a food diary to help you gauge what and how much you are eating.  If you're not gaining enough  If you don t gain enough, your baby could be too small or have health problems. Women tend to gain most of their weight in the second and third trimesters. For now:     Eat many types of foods. Make sure you get enough calcium, protein, and carbohydrates.    Don t skip meals.    Eat healthy snacks.    Pick nutrient-dense, high-calorie healthy food like trail mix or protein shakes.    See a dietitian for help.    Talk to your healthcare provider if you have had an eating disorder or problems with certain foods.  The following are ways to get more calories:    Eat breakfast every day. Peanut butter or a slice of cheese on  toast can give you an extra protein boost.    Snack between meals. Yogurt and dried fruits can provide protein, calcium, and minerals.    Try to eat more foods that are high in good fats, like nuts, fatty fish, avocados, and olive oil.    Drink juices made from real fruit that are high in vitamin C or beta-carotene, like grapefruit juice, orange juice, papaya nectar, apricot nectar, and carrot juice.    Don't eat junk food, like foods high in sugar.  [employer] last reviewed this educational content on 7/1/2021 2000-2023 [employer]. All rights reserved. This information is not intended as a substitute for professional medical care. Always follow your healthcare professional's instructions.        You have been provided the CDC Warning Signs in Pregnancy document.    Additional copies can be found here: [email or web address]

## 2023-07-21 PROBLEM — O09.92 SUPERVISION OF HIGH RISK PREGNANCY IN SECOND TRIMESTER: Status: ACTIVE | Noted: 2023-07-21

## 2023-07-21 PROBLEM — Z3A.08 8 WEEKS GESTATION OF PREGNANCY: Status: RESOLVED | Noted: 2023-05-24 | Resolved: 2023-07-21

## 2023-07-21 PROBLEM — O26.812 PREGNANCY RELATED FATIGUE IN SECOND TRIMESTER: Status: ACTIVE | Noted: 2023-07-21

## 2023-08-17 ENCOUNTER — PRENATAL OFFICE VISIT (OUTPATIENT)
Dept: FAMILY MEDICINE | Facility: CLINIC | Age: 28
End: 2023-08-17
Payer: COMMERCIAL

## 2023-08-17 VITALS
TEMPERATURE: 98.5 F | HEIGHT: 59 IN | OXYGEN SATURATION: 100 % | SYSTOLIC BLOOD PRESSURE: 103 MMHG | BODY MASS INDEX: 26.37 KG/M2 | DIASTOLIC BLOOD PRESSURE: 64 MMHG | HEART RATE: 74 BPM | RESPIRATION RATE: 16 BRPM | WEIGHT: 130.8 LBS

## 2023-08-17 DIAGNOSIS — O09.92 SUPERVISION OF HIGH RISK PREGNANCY IN SECOND TRIMESTER: Primary | ICD-10-CM

## 2023-08-17 DIAGNOSIS — O34.219 PREVIOUS CESAREAN DELIVERY, ANTEPARTUM CONDITION OR COMPLICATION: ICD-10-CM

## 2023-08-17 PROCEDURE — 99207 PR PRENATAL VISIT: CPT | Performed by: FAMILY MEDICINE

## 2023-08-17 NOTE — PATIENT INSTRUCTIONS
You can call [phone] to schedule your ultrasound at your convenience.  You will be 20 weeks gestation on 8/30/2023.  I would schedule the ultrasound within 1 week of that date.      Second-Trimester Fetal Ultrasound: About This Test  What is it?     Fetal ultrasound is a test that uses sound waves to make pictures of your baby (fetus) and placenta inside the uterus. The test is the safest way to find out the age, size, and position of your baby. You also may be able to find out the sex of your baby. (But the test isn't done just to find out a baby's sex.)  No known risks to the mother or the baby are linked to fetal ultrasound. But you may feel anxious if the test reveals a problem with your pregnancy or baby.  Why is this test done?  In the second trimester, a fetal ultrasound is done to:  Estimate the number of weeks and days a fetus has developed since the beginning of the pregnancy. This is called the gestational age.  Look at the size and position of the fetus, the placenta, and the fluid that surrounds the fetus.  Find major birth defects, such as heart problems or problems with the brain and spinal cord (neural tube defects). But the test may not be able to find many minor defects and some major birth defects.  How do you prepare for the test?  In general, there's nothing you have to do before this test, unless your doctor tells you to.  How is the test done?  You may be able to leave your clothes on, or you will be given a gown to wear.  You will lie on your back on a padded examination table.  A gel will be spread on your belly. It will be removed after the test.  A small, handheld device called a transducer will be pressed against the gel on your skin and moved across your belly several times.  You may watch the monitor to see the picture of your baby during the test.  What happens after the test?  You will probably be able to go home right away.  You most likely will be able to go back to your usual  "activities right away.  Follow-up care is a key part of your treatment and safety. Be sure to make and go to all appointments, and call your doctor if you are having problems. It's also a good idea to keep a list of the medicines you take. Ask your doctor when you can expect to have your test results.  Where can you learn more?  Go to https://www.[employer]/patiented  Enter Y671 in the search box to learn more about \"Second-Trimester Fetal Ultrasound: About This Test.\"  Current as of: November 9, 2022               Content Version: 13.7    1141-6231 [employer].   Care instructions adapted under license by your healthcare professional. If you have questions about a medical condition or this instruction, always ask your healthcare professional. [employer] disclaims any warranty or liability for your use of this information.      During Pregnancy: Exercises  Introduction  Here are some examples of exercises to do during your pregnancy. Start each exercise slowly. Ease off the exercise if you start to have pain.  Your doctor or physical therapist will tell you when you can start these exercises and which ones will work best for you.  How to do the exercises  Neck rotation    Sit in a firm chair, or stand tall. If you're standing, keep your feet about hip-width apart.  Keeping your chin level, turn your head to the right and hold for 15 to 30 seconds.  Turn your head to the left and hold for 15 to 30 seconds.  Repeat 2 to 4 times.  Next stretch to the front    Sit in a firm chair, or stand tall. Look straight ahead. If you're standing, keep your feet about hip-width apart.  Slowly bend your head forward without moving your shoulders.  Hold for 15 to 30 seconds, then return to your starting position.  Repeat 2 to 4 times.  Back press    Place your feet 10 to 12 inches from the wall.  Rest your back flat against the wall and slide down the wall until your knees are slightly bent.  Press your " lower back against the wall by pulling in your stomach muscles.  Hold for 6 seconds, and then relax your stomach muscles and slide back up the wall.  Repeat 8 to 12 times.  Trunk twist (seated)    Sit on the floor with your legs crossed. If that's not comfortable, you can sit on a folded blanket so your bottom is a few inches off the floor. Or you can sit on a chair with your knees comfortably spread apart and your feet flat on the floor.  Reach your left hand toward your right knee. You can place your right hand at your side for support.  Slowly twist your trunk (upper body) to your right.  Relax and return to your starting position.  Repeat 2 to 4 times.  Switch your hands and twist to your left.  Repeat 2 to 4 times.  Pelvic rocking    Kneeling on hands and knees, place your hands directly under your shoulders and your knees under your hips.  Breathe in deeply. Tuck your head downward and round your back up, making a curve with your back in the shape of the letter C. Hold this position for a count of 6.  Breathe out slowly and bring your head back up. Relax, keeping your back straight (don't allow it to curve toward the floor). Hold this for a count of 6.  Do this exercise 8 times or to your comfort level.  Pelvic tilt    This exercise strengthens your lower back and pelvis. It is for use during the first 4 months of pregnancy. After this point, lying on your back is not recommended, because it can cause blood flow problems for you and your baby.  Lie on your back.  Keep your knees relaxed.  Tighten your belly and buttocks muscles.  At the same time, gently shift your pelvis upward. This should flatten the curve in your back.  Hold for 6 seconds and then relax.  Gradually increase the number of tilts you do each day, to your comfort level.  Backward stretch    Kneel on hands and knees with your knees 8 to 10 inches apart, hands directly under your shoulders, and arms and back straight.  Keeping your arms  straight, slowly lower your buttocks toward your heels and tuck your head toward your knees. Hold for 15 to 30 seconds.  Slowly return to the kneeling position.  Repeat 2 to 4 times.  Forward bend    Sit comfortably in a chair, with your arms relaxed.  Slowly bend forward, allowing your arms to hang down in front of you. Lean only as far as you can without feeling discomfort or pressure on your belly.  Hold for 15 to 30 seconds and then slowly sit up straight.  Repeat 2 to 4 times or to your comfort level.  Leg lift crawl    Kneeling on hands and knees, place your hands directly under your shoulders and straighten your arms.  Tighten your belly muscles by pulling in your belly button toward your spine. Be sure you continue to breathe normally and do not hold your breath.  Lift your left knee and bring it toward your elbow.  Slowly extend your leg behind you without completely straightening it. Be careful not to let your hip drop down. Avoid arching your back.  Hold your leg behind you for about 6 seconds.  Return to your starting position.  Do the same exercise with your other leg.  Repeat 8 to 12 times for each leg.  Tailor sitting    Sit on the floor.  Bring your feet close to your body while crossing your ankles.  Hold this position for as long as you are comfortable.  Tailor stretching    Sit on the floor with your back straight, legs about 12 inches apart, and feet relaxed outward.  Stretch your hands forward toward your left foot, then sit up.  Stretch your hands straight forward, then sit up.  Stretch your hands forward toward your right foot, then sit up.  Hold each stretch for 15 to 30 seconds.  Repeat 2 to 4 times.  Follow-up care is a key part of your treatment and safety. Be sure to make and go to all appointments, and call your doctor if you are having problems. It's also a good idea to know your test results and keep a list of the medicines you take.  Current as of: November 9, 2022                "Content Version: 13.7    5523-0261 [email or web address].   Care instructions adapted under license by your healthcare professional. If you have questions about a medical condition or this instruction, always ask your healthcare professional. [email or web address] disclaims any warranty or liability for your use of this information.      Weeks 18 to 22 of Your Pregnancy: Care Instructions  At this stage you may find that your nausea and fatigue are gone. You may feel better overall and have more energy. But you might now also have some new discomforts, like sleep problems or leg cramps.    You may start to feel your baby move. These movements can feel like butterflies or bubbles.   Babies at this stage can now suck their thumbs.     Get some exercise every day.  And avoid caffeine late in the day.     Take a warm shower or bath before bed.  Try relaxation exercises to calm your mind and body.     Use extra pillows.  They can help you get comfortable.     Don't use sleeping pills or alcohol.  They could harm your baby.     For leg cramps, stretch and apply heat.  A warm bath, leg warmers, a heating pad, or a hot water bottle can help with muscle aches.   Stretches for leg cramps    Straighten your leg and bend your foot (flex your ankle) slowly upward, toward your knee. Bend your toes up and down.   Stand on a flat surface. Stretch your toes upward. For balance, hold on to the wall or something stable. If it feels okay, take small steps walking on your heels.   Follow-up care is a key part of your treatment and safety. Be sure to make and go to all appointments, and call your doctor if you are having problems. It's also a good idea to know your test results and keep a list of the medicines you take.  Where can you learn more?  Go to https://www.[employer].net/patiented  Enter W603 in the search box to learn more about \"Weeks 18 to 22 of Your Pregnancy: Care Instructions.\"  Current as of: November 9, 2022        "        Content Version: 13.7    2723-9779 [employer].   Care instructions adapted under license by your healthcare professional. If you have questions about a medical condition or this instruction, always ask your healthcare professional. [employer] disclaims any warranty or liability for your use of this information.      Round Ligament Pain: Care Instructions  Your Care Instructions     Round ligament pain is a common pain during pregnancy. You may feel a sharp brief pain on one or both sides of your belly. It may go down into your groin. It's usually felt for the first time during the second trimester.  This pain is a normal part of pregnancy. It will go away as your pregnancy continues or after your baby is born.  Your uterus is supported by two ligaments that go from the top and sides of the uterus to the bones of the pelvis. These are the round ligaments. As your uterus grows, these ligaments stretch and tighten with your movements. This may be the cause of the pain. You may find that certain activities seem to cause pain. If you can, avoid those activities.  Your doctor can usually diagnose round ligament pain from your symptoms and an exam. If you have bleeding or other symptoms, your doctor may also do an imaging test, such as an ultrasound. Your doctor may suggest that you take an over-the-counter pain medicine, such as acetaminophen.  Follow-up care is a key part of your treatment and safety. Be sure to make and go to all appointments, and call your doctor if you are having problems. It's also a good idea to know your test results and keep a list of the medicines you take.  How can you care for yourself at home?  If certain movements seem to trigger belly pain, see if you can avoid them while you are pregnant.  Stay active. If your doctor says it's okay, try moderate exercise. Water exercise may be a good choice if you have belly pain. Examples are swimming and water  "aerobics.  Ask your doctor about taking acetaminophen for pain. Be safe with medicines. Read and follow all instructions on the label.  When should you call for help?   Call your doctor now or seek immediate medical care if:    You think you might be in labor.     You have new or worse pain.   Watch closely for changes in your health, and be sure to contact your doctor if you have any problems.  Where can you learn more?  Go to https://www.[employer].net/patiented  Enter R110 in the search box to learn more about \"Round Ligament Pain: Care Instructions.\"  Current as of: November 9, 2022               Content Version: 13.7    5425-6477 [employer].   Care instructions adapted under license by your healthcare professional. If you have questions about a medical condition or this instruction, always ask your healthcare professional. [employer] disclaims any warranty or liability for your use of this information.      Leg and Ankle Edema: Care Instructions  Your Care Instructions  Swelling in the legs, ankles, and feet is called edema. It is common after you sit or stand for a while. Long plane flights or car rides often cause swelling in the legs and feet. You may also have swelling if you have to stand for long periods of time at your job. Problems with the veins in the legs (varicose veins) and changes in hormones can also cause swelling. Sometimes the swelling in the ankles and feet is caused by a more serious problem, such as heart failure, infection, blood clots, or liver or kidney disease.  Follow-up care is a key part of your treatment and safety. Be sure to make and go to all appointments, and call your doctor if you are having problems. It's also a good idea to know your test results and keep a list of the medicines you take.  How can you care for yourself at home?  If your doctor gave you medicine, take it as prescribed. Call your doctor if you think you are having a problem with " "your medicine.  Whenever you are resting, raise your legs up. Try to keep the swollen area higher than the level of your heart.  Take breaks from standing or sitting in one position.  Walk around to increase the blood flow in your lower legs.  Move your feet and ankles often while you stand, or tighten and relax your leg muscles.  Wear support stockings. Put them on in the morning, before swelling gets worse.  Eat a balanced diet. Lose weight if you need to.  Limit the amount of salt (sodium) in your diet. Salt holds fluid in the body and may increase swelling.  When should you call for help?   Call 911 anytime you think you may need emergency care. For example, call if:    You have symptoms of a blood clot in your lung (called a pulmonary embolism). These may include:  Sudden chest pain.  Trouble breathing.  Coughing up blood.   Call your doctor now or seek immediate medical care if:    You have signs of a blood clot, such as:  Pain in your calf, back of the knee, thigh, or groin.  Redness and swelling in your leg or groin.     You have symptoms of infection, such as:  Increased pain, swelling, warmth, or redness.  Red streaks or pus.  A fever.   Watch closely for changes in your health, and be sure to contact your doctor if:    Your swelling is getting worse.     You have new or worsening pain in your legs.     You do not get better as expected.   Where can you learn more?  Go to https://www.[employer].net/patiented  Enter N696 in the search box to learn more about \"Leg and Ankle Edema: Care Instructions.\"  Current as of: November 14, 2022               Content Version: 13.7    2091-9901 "[employer]   Care instructions adapted under license by your healthcare professional. If you have questions about a medical condition or this instruction, always ask your healthcare professional. "[employer]" disclaims any warranty or liability for your use of this information.      Back Pain During " Pregnancy: Care Instructions  Overview     Back pain has many possible causes. It is often caused by problems with muscles and ligaments in your back. The extra weight during pregnancy can put stress on your back. Moving, lifting, standing, sitting, or sleeping in an awkward way also can strain your back. Back pain can also be a sign of labor. Although it may hurt a lot, back pain often improves on its own. Use good home treatment, and take care not to stress your back.  Follow-up care is a key part of your treatment and safety. Be sure to make and go to all appointments, and call your doctor if you are having problems. It's also a good idea to know your test results and keep a list of the medicines you take.  How can you care for yourself at home?  Ask your doctor about taking acetaminophen (Tylenol) for pain. Do not take aspirin, ibuprofen (Advil, Motrin), or naproxen (Aleve).  Do not take two or more pain medicines at the same time unless the doctor told you to. Many pain medicines have acetaminophen, which is Tylenol. Too much acetaminophen (Tylenol) can be harmful.  Lie on your side with your knees and hips bent and a pillow between your legs. This reduces stress on your back.  Put ice or cold packs on your back for 10 to 20 minutes at a time, several times a day. Put a thin cloth between the ice and your skin.  Warm baths may also help reduce pain.  Change positions every 30 minutes. Take breaks if you must sit for a long time. Get up and walk around.  Ask your doctor about how much exercise you can do. You may feel better taking short walks or doing gentle movements and stretching in a swimming pool.  Ask your doctor about exercises to stretch and strengthen your back.  When should you call for help?   Call your doctor now or seek immediate medical care if:    You think you are in labor.     You have new numbness in your buttocks, genital or rectal areas, or legs.     You have a new loss of bowel or bladder  "control.   Watch closely for changes in your health, and be sure to contact your doctor if:    You do not get better as expected.   Where can you learn more?  Go to https://www.[employer].net/patiented  Enter C696 in the search box to learn more about \"Back Pain During Pregnancy: Care Instructions.\"  Current as of: 2022               Content Version: 13.7    6116-7822 [employer].   Care instructions adapted under license by your healthcare professional. If you have questions about a medical condition or this instruction, always ask your healthcare professional. [employer] disclaims any warranty or liability for your use of this information.       Labor: Care Instructions  Overview      labor is the start of labor between 20 and 36 weeks of pregnancy. Most babies are born at 37 to 42 weeks of pregnancy. In labor, the uterus contracts to open the cervix. This is the first stage of childbirth.  labor can be caused by a problem with the baby, the mother, or both. Often the cause is not known.  In some cases, doctors use medicines to try to delay labor until 34 or more weeks of pregnancy. By this time, a baby has grown enough so that problems are not likely. In some cases--such as with a serious infection--it is healthier for the baby to be born early. Your treatment will depend on how far along you are in your pregnancy and on your health and your baby's health.  Follow-up care is a key part of your treatment and safety. Be sure to make and go to all appointments, and call your doctor if you are having problems. It's also a good idea to know your test results and keep a list of the medicines you take.  How can you care for yourself at home?  If your doctor prescribed medicines, take them exactly as directed. Call your doctor if you think you are having a problem with your medicine.  Rest until your doctor advises you about activity.  Do not have sexual " "intercourse unless your doctor says it is safe.  Use sanitary pads if you have vaginal bleeding. Using pads makes it easier to monitor your bleeding.  Do not smoke or allow others to smoke around you. If you need help quitting, talk to your doctor about stop-smoking programs and medicines. These can increase your chances of quitting for good.  When should you call for help?   Call 911  anytime you think you may need emergency care. For example, call if:    You passed out (lost consciousness).     You have a seizure.     You have severe vaginal bleeding.     You have severe pain in your belly or pelvis that doesn't get better between contractions.     You have had fluid gushing or leaking from your vagina and you know or think the umbilical cord is bulging into your vagina. If this happens, immediately get down on your knees so your rear end (buttocks) is higher than your head. This will decrease the pressure on the cord until help arrives.   Call your doctor now or seek immediate medical care if:    You have signs of preeclampsia, such as:  Sudden swelling of your face, hands, or feet.  New vision problems (such as dimness, blurring, or seeing spots).  A severe headache.     You have any vaginal bleeding.     You have belly pain or cramping.     You have a fever.     You have had regular contractions (with or without pain) for an hour. This means that you have 6 or more within 1 hour after you change your position and drink fluids.     You have a sudden release of fluid from the vagina.     You have low back pain or pelvic pressure that does not go away.     You notice that your baby has stopped moving or is moving much less than normal.   Watch closely for changes in your health, and be sure to contact your doctor if you have any problems.  Where can you learn more?  Go to https://www.healthwise.net/patiented  Enter Q400 in the search box to learn more about \" Labor: Care Instructions.\"  Current as of: " November 9, 2022               Content Version: 13.7    4611-1123 [employer].   Care instructions adapted under license by your healthcare professional. If you have questions about a medical condition or this instruction, always ask your healthcare professional. [employer] disclaims any warranty or liability for your use of this information.

## 2023-08-17 NOTE — PROGRESS NOTES
Patient reports that she has been feeling well.  She reports good fetal movement, denies LOF or bleeding.  She feels more energy.  Discussed fetal survey ultrasound today and order given.  Plan next routine visit in 4 weeks.  Concerns today: None  Doing well.  No concerns today.  No nausea/vomiting. No heartburn.  No vaginal bleeding, no contractions/severe cramping, no leakage of fluid.  No vaginal discharge. No dysuria  No headache, vision changes, lower extremity swelling, upper abdominal pain, chest pain, shortness of breath.   Discussed PTL, PROM, and when to call or come in.  Reportable signs and symptoms discussed.      Felicia Umana MD

## 2023-08-24 ENCOUNTER — HOSPITAL ENCOUNTER (OUTPATIENT)
Dept: ULTRASOUND IMAGING | Facility: CLINIC | Age: 28
Discharge: HOME | End: 2023-08-24
Attending: FAMILY MEDICINE | Admitting: FAMILY MEDICINE
Payer: COMMERCIAL

## 2023-08-24 DIAGNOSIS — O09.92 SUPERVISION OF HIGH RISK PREGNANCY IN SECOND TRIMESTER: ICD-10-CM

## 2023-08-24 DIAGNOSIS — O34.219 PREVIOUS CESAREAN DELIVERY, ANTEPARTUM CONDITION OR COMPLICATION: ICD-10-CM

## 2023-08-24 PROCEDURE — 76805 OB US >/= 14 WKS SNGL FETUS: CPT

## 2023-08-25 NOTE — RESULT ENCOUNTER NOTE
Patient updated by [facility] message with imaging results. PCP out of the office.      Dr. Dong Nelson is out of the office. I am helping to cover her results messages.   Your ultrasound overall is reassuring.   It does show a small choroid plexus cyst that should be followed. You should discuss these results with Dr. Umana at your next OB visit and I will forward her a message to ensure she reviews this result as well.  Mercy Sky, DO

## 2023-09-12 ENCOUNTER — PRENATAL OFFICE VISIT (OUTPATIENT)
Dept: FAMILY MEDICINE | Facility: CLINIC | Age: 28
End: 2023-09-12
Payer: COMMERCIAL

## 2023-09-12 VITALS
SYSTOLIC BLOOD PRESSURE: 107 MMHG | WEIGHT: 134.4 LBS | TEMPERATURE: 98.1 F | HEART RATE: 76 BPM | BODY MASS INDEX: 27.09 KG/M2 | HEIGHT: 59 IN | RESPIRATION RATE: 20 BRPM | DIASTOLIC BLOOD PRESSURE: 65 MMHG | OXYGEN SATURATION: 99 %

## 2023-09-12 DIAGNOSIS — O35.03X0 CHOROID PLEXUS CYST OF FETUS IN SINGLETON PREGNANCY: ICD-10-CM

## 2023-09-12 DIAGNOSIS — O34.219 PREVIOUS CESAREAN DELIVERY, ANTEPARTUM CONDITION OR COMPLICATION: ICD-10-CM

## 2023-09-12 DIAGNOSIS — O09.92 SUPERVISION OF HIGH RISK PREGNANCY IN SECOND TRIMESTER: Primary | ICD-10-CM

## 2023-09-12 PROCEDURE — 90686 IIV4 VACC NO PRSV 0.5 ML IM: CPT | Performed by: FAMILY MEDICINE

## 2023-09-12 PROCEDURE — 90471 IMMUNIZATION ADMIN: CPT | Performed by: FAMILY MEDICINE

## 2023-09-12 PROCEDURE — 36415 COLL VENOUS BLD VENIPUNCTURE: CPT | Performed by: FAMILY MEDICINE

## 2023-09-12 PROCEDURE — 99207 PR PRENATAL VISIT: CPT | Performed by: FAMILY MEDICINE

## 2023-09-12 NOTE — PATIENT INSTRUCTIONS
"Weeks 22 to 26 of Your Pregnancy: Care Instructions  Your baby's lungs are getting ready for breathing. Your baby may respond to your voice. Your baby likely turns less, and kicks or jerks more. Jerking may mean that your baby has hiccups.    Think about taking childbirth classes. And start to think about whether you want to have pain medicine during labor.   At your next doctor visit, you may be tested for anemia and for high blood sugar that first occurs during pregnancy (gestational diabetes). These conditions can cause problems for you and your baby.     To ease discomfort, such as back pain    Change your position often. Try not to sit or stand for too long.  Get some exercise. Things like walking or stretching may help.  Try using a heating pad or cold pack.    To ease or reduce swelling in your feet, ankles, hands, and fingers    Take off your rings.  Avoid high-sodium foods, such as potato chips.  Prop up your feet, and sleep with pillows under your feet.  Try to avoid standing for long periods of time.  Do not wear tight shoes.  Wear support stockings.  Kegel exercises to prevent urine from leaking    Squeeze your muscles as if you were trying not to pass gas. Your belly, legs, and buttocks shouldn't move. Hold the squeeze for 3 seconds, then relax for 5 to 10 seconds.    Add 1 second each week until you can squeeze for 10 seconds. Repeat the exercise 10 times a session. Do 3 to 8 sessions a day. If these exercises cause you pain, stop doing them and talk with your doctor.  Follow-up care is a key part of your treatment and safety. Be sure to make and go to all appointments, and call your doctor if you are having problems. It's also a good idea to know your test results and keep a list of the medicines you take.  Where can you learn more?  Go to https://www.healthwise.net/patiented  Enter G264 in the search box to learn more about \"Weeks 22 to 26 of Your Pregnancy: Care Instructions.\"  Current as of: " November 9, 2022               Content Version: 13.7    7546-4230 [employer].   Care instructions adapted under license by your healthcare professional. If you have questions about a medical condition or this instruction, always ask your healthcare professional. [employer] disclaims any warranty or liability for your use of this information.      Round Ligament Pain: Care Instructions  Your Care Instructions     Round ligament pain is a common pain during pregnancy. You may feel a sharp brief pain on one or both sides of your belly. It may go down into your groin. It's usually felt for the first time during the second trimester.  This pain is a normal part of pregnancy. It will go away as your pregnancy continues or after your baby is born.  Your uterus is supported by two ligaments that go from the top and sides of the uterus to the bones of the pelvis. These are the round ligaments. As your uterus grows, these ligaments stretch and tighten with your movements. This may be the cause of the pain. You may find that certain activities seem to cause pain. If you can, avoid those activities.  Your doctor can usually diagnose round ligament pain from your symptoms and an exam. If you have bleeding or other symptoms, your doctor may also do an imaging test, such as an ultrasound. Your doctor may suggest that you take an over-the-counter pain medicine, such as acetaminophen.  Follow-up care is a key part of your treatment and safety. Be sure to make and go to all appointments, and call your doctor if you are having problems. It's also a good idea to know your test results and keep a list of the medicines you take.  How can you care for yourself at home?  If certain movements seem to trigger belly pain, see if you can avoid them while you are pregnant.  Stay active. If your doctor says it's okay, try moderate exercise. Water exercise may be a good choice if you have belly pain. Examples are  "swimming and water aerobics.  Ask your doctor about taking acetaminophen for pain. Be safe with medicines. Read and follow all instructions on the label.  When should you call for help?   Call your doctor now or seek immediate medical care if:    You think you might be in labor.     You have new or worse pain.   Watch closely for changes in your health, and be sure to contact your doctor if you have any problems.  Where can you learn more?  Go to https://[email or web address]/patiented  Enter R110 in the search box to learn more about \"Round Ligament Pain: Care Instructions.\"  Current as of: November 9, 2022               Content Version: 13.7    9935-2778 [employer].   Care instructions adapted under license by your healthcare professional. If you have questions about a medical condition or this instruction, always ask your healthcare professional. [employer] disclaims any warranty or liability for your use of this information.      Leg and Ankle Edema: Care Instructions  Your Care Instructions  Swelling in the legs, ankles, and feet is called edema. It is common after you sit or stand for a while. Long plane flights or car rides often cause swelling in the legs and feet. You may also have swelling if you have to stand for long periods of time at your job. Problems with the veins in the legs (varicose veins) and changes in hormones can also cause swelling. Sometimes the swelling in the ankles and feet is caused by a more serious problem, such as heart failure, infection, blood clots, or liver or kidney disease.  Follow-up care is a key part of your treatment and safety. Be sure to make and go to all appointments, and call your doctor if you are having problems. It's also a good idea to know your test results and keep a list of the medicines you take.  How can you care for yourself at home?  If your doctor gave you medicine, take it as prescribed. Call your doctor if you think you are having " "a problem with your medicine.  Whenever you are resting, raise your legs up. Try to keep the swollen area higher than the level of your heart.  Take breaks from standing or sitting in one position.  Walk around to increase the blood flow in your lower legs.  Move your feet and ankles often while you stand, or tighten and relax your leg muscles.  Wear support stockings. Put them on in the morning, before swelling gets worse.  Eat a balanced diet. Lose weight if you need to.  Limit the amount of salt (sodium) in your diet. Salt holds fluid in the body and may increase swelling.  When should you call for help?   Call 911 anytime you think you may need emergency care. For example, call if:    You have symptoms of a blood clot in your lung (called a pulmonary embolism). These may include:  Sudden chest pain.  Trouble breathing.  Coughing up blood.   Call your doctor now or seek immediate medical care if:    You have signs of a blood clot, such as:  Pain in your calf, back of the knee, thigh, or groin.  Redness and swelling in your leg or groin.     You have symptoms of infection, such as:  Increased pain, swelling, warmth, or redness.  Red streaks or pus.  A fever.   Watch closely for changes in your health, and be sure to contact your doctor if:    Your swelling is getting worse.     You have new or worsening pain in your legs.     You do not get better as expected.   Where can you learn more?  Go to https://www.yepme.com.net/patiented  Enter N696 in the search box to learn more about \"Leg and Ankle Edema: Care Instructions.\"  Current as of: November 14, 2022               Content Version: 13.7    9279-0890 [employer].   Care instructions adapted under license by your healthcare professional. If you have questions about a medical condition or this instruction, always ask your healthcare professional. [employer] disclaims any warranty or liability for your use of this information.      Back " Pain During Pregnancy: Care Instructions  Overview     Back pain has many possible causes. It is often caused by problems with muscles and ligaments in your back. The extra weight during pregnancy can put stress on your back. Moving, lifting, standing, sitting, or sleeping in an awkward way also can strain your back. Back pain can also be a sign of labor. Although it may hurt a lot, back pain often improves on its own. Use good home treatment, and take care not to stress your back.  Follow-up care is a key part of your treatment and safety. Be sure to make and go to all appointments, and call your doctor if you are having problems. It's also a good idea to know your test results and keep a list of the medicines you take.  How can you care for yourself at home?  Ask your doctor about taking acetaminophen (Tylenol) for pain. Do not take aspirin, ibuprofen (Advil, Motrin), or naproxen (Aleve).  Do not take two or more pain medicines at the same time unless the doctor told you to. Many pain medicines have acetaminophen, which is Tylenol. Too much acetaminophen (Tylenol) can be harmful.  Lie on your side with your knees and hips bent and a pillow between your legs. This reduces stress on your back.  Put ice or cold packs on your back for 10 to 20 minutes at a time, several times a day. Put a thin cloth between the ice and your skin.  Warm baths may also help reduce pain.  Change positions every 30 minutes. Take breaks if you must sit for a long time. Get up and walk around.  Ask your doctor about how much exercise you can do. You may feel better taking short walks or doing gentle movements and stretching in a swimming pool.  Ask your doctor about exercises to stretch and strengthen your back.  When should you call for help?   Call your doctor now or seek immediate medical care if:    You think you are in labor.     You have new numbness in your buttocks, genital or rectal areas, or legs.     You have a new loss of bowel  "or bladder control.   Watch closely for changes in your health, and be sure to contact your doctor if:    You do not get better as expected.   Where can you learn more?  Go to https://www.[employer].net/patiented  Enter C696 in the search box to learn more about \"Back Pain During Pregnancy: Care Instructions.\"  Current as of: 2022               Content Version: 13.7    9571-2997 "[employer].   Care instructions adapted under license by your healthcare professional. If you have questions about a medical condition or this instruction, always ask your healthcare professional. "[employer] disclaims any warranty or liability for your use of this information.       Labor: Care Instructions  Overview      labor is the start of labor between 20 and 36 weeks of pregnancy. Most babies are born at 37 to 42 weeks of pregnancy. In labor, the uterus contracts to open the cervix. This is the first stage of childbirth.  labor can be caused by a problem with the baby, the mother, or both. Often the cause is not known.  In some cases, doctors use medicines to try to delay labor until 34 or more weeks of pregnancy. By this time, a baby has grown enough so that problems are not likely. In some cases--such as with a serious infection--it is healthier for the baby to be born early. Your treatment will depend on how far along you are in your pregnancy and on your health and your baby's health.  Follow-up care is a key part of your treatment and safety. Be sure to make and go to all appointments, and call your doctor if you are having problems. It's also a good idea to know your test results and keep a list of the medicines you take.  How can you care for yourself at home?  If your doctor prescribed medicines, take them exactly as directed. Call your doctor if you think you are having a problem with your medicine.  Rest until your doctor advises you about activity.  Do not have " "sexual intercourse unless your doctor says it is safe.  Use sanitary pads if you have vaginal bleeding. Using pads makes it easier to monitor your bleeding.  Do not smoke or allow others to smoke around you. If you need help quitting, talk to your doctor about stop-smoking programs and medicines. These can increase your chances of quitting for good.  When should you call for help?   Call 911  anytime you think you may need emergency care. For example, call if:    You passed out (lost consciousness).     You have a seizure.     You have severe vaginal bleeding.     You have severe pain in your belly or pelvis that doesn't get better between contractions.     You have had fluid gushing or leaking from your vagina and you know or think the umbilical cord is bulging into your vagina. If this happens, immediately get down on your knees so your rear end (buttocks) is higher than your head. This will decrease the pressure on the cord until help arrives.   Call your doctor now or seek immediate medical care if:    You have signs of preeclampsia, such as:  Sudden swelling of your face, hands, or feet.  New vision problems (such as dimness, blurring, or seeing spots).  A severe headache.     You have any vaginal bleeding.     You have belly pain or cramping.     You have a fever.     You have had regular contractions (with or without pain) for an hour. This means that you have 6 or more within 1 hour after you change your position and drink fluids.     You have a sudden release of fluid from the vagina.     You have low back pain or pelvic pressure that does not go away.     You notice that your baby has stopped moving or is moving much less than normal.   Watch closely for changes in your health, and be sure to contact your doctor if you have any problems.  Where can you learn more?  Go to https://www.healthwise.net/patiented  Enter Q400 in the search box to learn more about \" Labor: Care Instructions.\"  Current as " of: November 9, 2022               Content Version: 13.7    7145-2190 [employer].   Care instructions adapted under license by your healthcare professional. If you have questions about a medical condition or this instruction, always ask your healthcare professional. [employer] disclaims any warranty or liability for your use of this information.

## 2023-09-12 NOTE — PROGRESS NOTES
Patient continues to feel well.  She denies leakage of fluid or bleeding, has been feeling good fetal movement.  Reviewed her fetal survey ultrasound which was normal with the exception of a single choroid plexus cyst.  In light of this, patient would like [employer] testing done and this was ordered today.  We did discuss at some length that baby is still at low risk of chromosomal anomalies given this isolated finding.  Plan follow-up in 4 weeks with 1 hour GCT next visit.  Concerns today: choroid plexus cyst  No nausea/vomiting. No heartburn.  No vaginal bleeding, no contractions/severe cramping, no leakage of fluid.  No vaginal discharge. No dysuria  No headache, vision changes, lower extremity swelling, upper abdominal pain, chest pain, shortness of breath.   Discussed PTL, PROM, and when to call or come in.  Reportable signs and symptoms discussed.      Felicia Umana MD

## 2023-09-18 LAB — SCANNED LAB RESULT: NORMAL

## 2023-10-12 ENCOUNTER — PRENATAL OFFICE VISIT (OUTPATIENT)
Dept: FAMILY MEDICINE | Facility: CLINIC | Age: 28
End: 2023-10-12

## 2023-10-12 VITALS
HEART RATE: 78 BPM | DIASTOLIC BLOOD PRESSURE: 68 MMHG | BODY MASS INDEX: 27.62 KG/M2 | RESPIRATION RATE: 16 BRPM | HEIGHT: 59 IN | TEMPERATURE: 98.5 F | WEIGHT: 137 LBS | SYSTOLIC BLOOD PRESSURE: 104 MMHG

## 2023-10-12 DIAGNOSIS — O09.92 SUPERVISION OF HIGH RISK PREGNANCY IN SECOND TRIMESTER: Primary | ICD-10-CM

## 2023-10-12 DIAGNOSIS — O34.219 PREVIOUS CESAREAN DELIVERY, ANTEPARTUM CONDITION OR COMPLICATION: ICD-10-CM

## 2023-10-12 LAB
GLUCOSE 1H P 50 G GLC PO SERPL-MCNC: 111 MG/DL (ref 70–129)
HGB BLD-MCNC: 12.5 G/DL (ref 11.7–15.7)

## 2023-10-12 PROCEDURE — 82950 GLUCOSE TEST: CPT | Performed by: FAMILY MEDICINE

## 2023-10-12 PROCEDURE — 85018 HEMOGLOBIN: CPT | Performed by: FAMILY MEDICINE

## 2023-10-12 PROCEDURE — 99207 PR PRENATAL VISIT: CPT | Performed by: FAMILY MEDICINE

## 2023-10-12 PROCEDURE — 86780 TREPONEMA PALLIDUM: CPT | Performed by: FAMILY MEDICINE

## 2023-10-12 PROCEDURE — 36415 COLL VENOUS BLD VENIPUNCTURE: CPT | Performed by: FAMILY MEDICINE

## 2023-10-12 NOTE — PROGRESS NOTES
Patient reports that she has been feeling well.  She denies leakage of fluid or bleeding, reports good fetal movement.  She denies RUQ pain, headaches, blurred vision or lower extremity edema.  She completed and passed her 1 hour GCT today and hemoglobin looks great.  She continues to measure small, and historically has smaller babies.  Recommended ultrasound to recheck growth and KENY in about 2 weeks and order was given today.  She would like to have her COVID booster next visit.  Concerns: None  Doing well.  No concerns today.  No vaginal bleeding, no contractions, no leakage of fluid  No nausea/vomiting. No heartburn  No vaginal discharge. No dysuria.   No headache, vision changes, lower extremity swelling, upper abdominal pain, chest pain, shortness of breath  Reportable signs and symptoms discussed.  Tdap planned next visit  Discussed PTL, PROM, and when to call or come in.  RTC 4 weeks.  GTT and labs today       Felicia Umana MD

## 2023-10-13 LAB — T PALLIDUM AB SER QL: NONREACTIVE

## 2023-10-26 ENCOUNTER — ANCILLARY PROCEDURE (OUTPATIENT)
Dept: ULTRASOUND IMAGING | Facility: CLINIC | Age: 28
End: 2023-10-26
Attending: FAMILY MEDICINE
Payer: COMMERCIAL

## 2023-10-26 DIAGNOSIS — O09.92 SUPERVISION OF HIGH RISK PREGNANCY IN SECOND TRIMESTER: ICD-10-CM

## 2023-10-26 DIAGNOSIS — O34.219 PREVIOUS CESAREAN DELIVERY, ANTEPARTUM CONDITION OR COMPLICATION: ICD-10-CM

## 2023-10-26 PROCEDURE — 76816 OB US FOLLOW-UP PER FETUS: CPT | Mod: TC | Performed by: RADIOLOGY

## 2023-11-09 ENCOUNTER — PRENATAL OFFICE VISIT (OUTPATIENT)
Dept: FAMILY MEDICINE | Facility: CLINIC | Age: 28
End: 2023-11-09
Payer: COMMERCIAL

## 2023-11-09 VITALS
OXYGEN SATURATION: 97 % | WEIGHT: 139 LBS | SYSTOLIC BLOOD PRESSURE: 107 MMHG | DIASTOLIC BLOOD PRESSURE: 68 MMHG | RESPIRATION RATE: 16 BRPM | TEMPERATURE: 98.3 F | BODY MASS INDEX: 28.02 KG/M2 | HEIGHT: 59 IN | HEART RATE: 110 BPM

## 2023-11-09 DIAGNOSIS — O09.93 SUPERVISION OF HIGH RISK PREGNANCY IN THIRD TRIMESTER: Primary | ICD-10-CM

## 2023-11-09 DIAGNOSIS — O34.219 PREVIOUS CESAREAN DELIVERY, ANTEPARTUM CONDITION OR COMPLICATION: ICD-10-CM

## 2023-11-09 PROBLEM — O26.812 PREGNANCY RELATED FATIGUE IN SECOND TRIMESTER: Status: RESOLVED | Noted: 2023-07-21 | Resolved: 2023-11-09

## 2023-11-09 PROCEDURE — 90715 TDAP VACCINE 7 YRS/> IM: CPT | Performed by: FAMILY MEDICINE

## 2023-11-09 PROCEDURE — 91320 SARSCV2 VAC 30MCG TRS-SUC IM: CPT | Performed by: FAMILY MEDICINE

## 2023-11-09 PROCEDURE — 90471 IMMUNIZATION ADMIN: CPT | Performed by: FAMILY MEDICINE

## 2023-11-09 PROCEDURE — 99207 PR PRENATAL VISIT: CPT | Performed by: FAMILY MEDICINE

## 2023-11-09 PROCEDURE — 90480 ADMN SARSCOV2 VAC 1/ONLY CMP: CPT | Performed by: FAMILY MEDICINE

## 2023-11-09 NOTE — PROGRESS NOTES
Patient reports that she has been feeling well.  She denies leakage of fluid or bleeding, reports good fetal movement.  She denies right upper quadrant pain, headaches or blurred vision.  She is due for her trial of labor after  section consult, and referral was placed today.  She has seen [facility] for this in the past.  To review, she had an initial  section due to fetal distress primarily.  She has followed this with 2 spontaneous vaginal births.  Tdap and COVID booster given today.  Kick counting discussed in detail.  Plan growth ultrasound ordered next visit.    Concerns: None  Doing well.  No concerns today.  No vaginal bleeding, LOF.  No contractions.  Discussed kick counts and fetal movement.  Reportable signs and symptoms discussed.  Discussed PTL, PROM, and when to call or come in.  RTC 2 weeks (scheduled in 3 weeks as this works better for her).    Felicia Umana MD

## 2023-11-09 NOTE — PATIENT INSTRUCTIONS
"Weeks 30 to 32 of Your Pregnancy: Care Instructions  Your baby is growing more every day. Its eyes can open and close, and it may have hair on its head. Your baby may sleep 20 to 45 minutes at a time and is more active at certain times.    You should feel your baby move several times every day. Your baby now turns less and kicks more.   This is a good time to tour your hospital or birthing center. You may also want to find childcare if needed.     To ease heartburn    Avoid foods that make your symptoms worse, such as chocolate, spicy foods, and caffeine.  Avoid bending over or lying down after meals.  Do not eat for 2 hours before bedtime.  Take antacids like Tums, but don't take ones that have sodium bicarbonate, magnesium trisilicate, or aspirin.    To care for large, swollen veins (varicose veins)    Try to avoid standing for long periods of time.  Sit with your feet propped up.  Wear support hose.  Get some exercise every day, like walking or swimming.  Counting your baby's kicks  Your doctor may ask you to count your baby's movements, such as kicks, flutters, or rolls.    Find a quiet place, and get comfortable. Write down your start time. Count your baby's movements (except hiccups). When your baby has moved 10 times, you can stop counting. Write down how many minutes it took.   If an hour goes by and you don't feel 10 movements, have something to eat or drink. Count for another hour. If you don't feel at least 10 movements in the 2-hour period, call your doctor.   Follow-up care is a key part of your treatment and safety. Be sure to make and go to all appointments, and call your doctor if you are having problems. It's also a good idea to know your test results and keep a list of the medicines you take.  Where can you learn more?  Go to https://www.[employer].net/patiented  Enter X471 in the search box to learn more about \"Weeks 30 to 32 of Your Pregnancy: Care Instructions.\"  Current as of: July 11, " "2023               Content Version: 13.8    2291-5549 [employer].   Care instructions adapted under license by your healthcare professional. If you have questions about a medical condition or this instruction, always ask your healthcare professional. [employer] disclaims any warranty or liability for your use of this information.      Learning About Birth Control After Childbirth  What is birth control?  Birth control is any method used to prevent pregnancy. If you have vaginal sex without birth control, you could get pregnant--even if you haven't started having periods again. You're less likely to get pregnant while breastfeeding, but it's still possible. Finding birth control that works for you can help avoid an unplanned pregnancy.  There are many kinds of birth control. Each has pros and cons. Find what works for you. Talk to your doctor if you've just given birth or are breastfeeding.    Long-acting reversible contraception (LARC). These are placed inside your body by a doctor. They can prevent pregnancy for years.  Examples include:  An implant (hormonal).  Copper intrauterine device (IUD).  Hormonal IUDs.   Short-acting hormonal methods. These release hormones. Examples include:  Combination birth control pills (\"the pill\").  Skin patches.  A vaginal ring.  A shot.  Mini-pills. Choose progestin-only options soon after giving birth.     Barrier methods. Use these every time you have vaginal sex.  Examples include:  External (male) condoms.  Internal (female) condoms.  Diaphragms.  Cervical caps.  Sponges.   Spermicides. These kill sperm or stop sperm from moving. They can be gels, creams, foams, films, or tablets. Use them before vaginal sex.  Examples include:  Nonoxynol-9.  pH regulator gel.     Permanent birth control (sterilization). This can be an option if you're sure that you don't want to get pregnant later.  Examples include:  Vasectomy.  Having tubes tied (tubal ligation). " "  Emergency contraception. This is a backup method. Use it if you didn't use birth control or your birth control method failed.  Examples include:  Copper and hormonal IUDs.  Emergency contraceptive pills.     Fertility awareness. You'll learn when you are most likely to become pregnant (are fertile). You can avoid vaginal sex at that time.  It's also called:  Natural family planning.  The rhythm method.   Breastfeeding. This is most effective when all of these are true:  Your baby is younger than 6 months old.  You're breastfeeding and not bottle-feeding at all.  You aren't having periods.   Follow-up care is a key part of your treatment and safety. Be sure to make and go to all appointments, and call your doctor if you are having problems. It's also a good idea to know your test results and keep a list of the medicines you take.  Where can you learn more?  Go to https://www.[employer].net/patiented  Enter X408 in the search box to learn more about \"Learning About Birth Control After Childbirth.\"  Current as of: July 11, 2023               Content Version: 13.8    1164-9155 [employer].   Care instructions adapted under license by your healthcare professional. If you have questions about a medical condition or this instruction, always ask your healthcare professional. [employer] disclaims any warranty or liability for your use of this information.      "

## 2023-11-28 ENCOUNTER — PRENATAL OFFICE VISIT (OUTPATIENT)
Dept: FAMILY MEDICINE | Facility: CLINIC | Age: 28
End: 2023-11-28
Payer: COMMERCIAL

## 2023-11-28 VITALS
SYSTOLIC BLOOD PRESSURE: 107 MMHG | WEIGHT: 139.2 LBS | HEIGHT: 59 IN | TEMPERATURE: 97.7 F | RESPIRATION RATE: 16 BRPM | DIASTOLIC BLOOD PRESSURE: 63 MMHG | BODY MASS INDEX: 28.06 KG/M2 | HEART RATE: 85 BPM | OXYGEN SATURATION: 97 %

## 2023-11-28 DIAGNOSIS — O09.93 SUPERVISION OF HIGH RISK PREGNANCY IN THIRD TRIMESTER: Primary | ICD-10-CM

## 2023-11-28 DIAGNOSIS — O34.219 PREVIOUS CESAREAN DELIVERY, ANTEPARTUM CONDITION OR COMPLICATION: ICD-10-CM

## 2023-11-28 PROCEDURE — 90471 IMMUNIZATION ADMIN: CPT | Performed by: FAMILY MEDICINE

## 2023-11-28 PROCEDURE — 90678 RSV VACC PREF BIVALENT IM: CPT | Performed by: FAMILY MEDICINE

## 2023-11-28 PROCEDURE — 99207 PR PRENATAL VISIT: CPT | Performed by: FAMILY MEDICINE

## 2023-11-28 NOTE — PROGRESS NOTES
Patient reports that overall she has been feeling well.  She has noticed a little bit of labial swelling and possibly some asymmetry of her labia.  We did discuss that labial swelling can be normal in pregnancy, could also be related to some varicose veins.  We discussed some supportive undergarments.  She denies leakage of fluid or bleeding, denies right upper quadrant pain, headaches or blurred vision.  She has no lower extremity edema.  She has her  consult scheduled with OB/GYN in mid December.  RSV vaccine discussed and given today.    Concerns: Labial swelling  No vaginal bleeding, LOF.  No contractions.  Cephalic position confirmed by Leopold maneuvers.  Discussed kick counts and fetal movement.  Reportable signs and symptoms discussed.  Discussed PTL, PROM, and when to call or come in.  RTC 2 weeks.    Felicia Umana MD

## 2023-11-28 NOTE — PATIENT INSTRUCTIONS
"Weeks 32 to 34 of Your Pregnancy: Care Instructions    Decide whether you want to bank or donate your baby's umbilical cord blood. If you want to save this blood, you have to arrange for it ahead of time.   Decide about circumcision. Personal, Hindu, or cultural beliefs may play a role in your decision. You get to decide what you want for your baby.     Learn how to ease hemorrhoids.    Get more liquids, fruits, vegetables, and fiber in your diet.  Avoid sitting for too long.  Clean yourself with moist toilet paper. Or try witch hazel pads.  Try ice packs or warm sitz baths for discomfort.  Use hydrocortisone cream for pain or itching.  Ask your doctor about stool softeners.    Consider the benefits of breastfeeding.    It reduces your baby's risk of sudden infant death syndrome (SIDS).   babies are less likely to get certain infections. And they're less likely to be obese or get diabetes later in life.  It can lower your risk of breast and ovarian cancers and osteoporosis.  It saves you money.  Follow-up care is a key part of your treatment and safety. Be sure to make and go to all appointments, and call your doctor if you are having problems. It's also a good idea to know your test results and keep a list of the medicines you take.  Where can you learn more?  Go to https://www.FoodSh[employer]r.net/patiented  Enter X711 in the search box to learn more about \"Weeks 32 to 34 of Your Pregnancy: Care Instructions.\"  Current as of: July 11, 2023               Content Version: 13.8    2412-6886 [employer].   Care instructions adapted under license by your healthcare professional. If you have questions about a medical condition or this instruction, always ask your healthcare professional. [employer] disclaims any warranty or liability for your use of this information.      You have been provided the Any Day Now: Early Labor at Home document.    Additional copies can be found here:  " [email or web address]  You have been provided the What I'd Wish I'd Known About Giving Birth document.    Additional copies can be found here:  [email or web address]

## 2023-12-11 ENCOUNTER — PRENATAL OFFICE VISIT (OUTPATIENT)
Dept: FAMILY MEDICINE | Facility: CLINIC | Age: 28
End: 2023-12-11
Payer: COMMERCIAL

## 2023-12-11 VITALS
OXYGEN SATURATION: 98 % | WEIGHT: 141.3 LBS | TEMPERATURE: 97.5 F | BODY MASS INDEX: 28.48 KG/M2 | DIASTOLIC BLOOD PRESSURE: 70 MMHG | SYSTOLIC BLOOD PRESSURE: 114 MMHG | RESPIRATION RATE: 14 BRPM | HEART RATE: 91 BPM | HEIGHT: 59 IN

## 2023-12-11 DIAGNOSIS — O34.219 PREVIOUS CESAREAN DELIVERY, ANTEPARTUM CONDITION OR COMPLICATION: ICD-10-CM

## 2023-12-11 DIAGNOSIS — O09.93 SUPERVISION OF HIGH RISK PREGNANCY IN THIRD TRIMESTER: Primary | ICD-10-CM

## 2023-12-11 PROCEDURE — 99207 PR PRENATAL VISIT: CPT | Performed by: FAMILY MEDICINE

## 2023-12-11 ASSESSMENT — PAIN SCALES - GENERAL: PAINLEVEL: NO PAIN (0)

## 2023-12-11 NOTE — PATIENT INSTRUCTIONS
Call directly to maternity care at [phone] if you feel that you are in labor.    Weeks 34 to 36 of Your Pregnancy: Care Instructions  Your belly has grown quite large. It's almost time to give birth! Your baby's lungs are almost ready to breathe air. The skull bones are firm enough to protect your baby's head. But they're soft enough to move down through the birth canal.    You might be wondering what to expect during labor. Because each birth is different, there's no way to know exactly what childbirth will be like for you. Talk to your doctor or midwife about any concerns you have.   You'll probably have a test for group B streptococcus (GBS). GBS is bacteria that can live in the vagina and rectum. GBS can make your baby sick after birth. If you test positive, you'll get antibiotics during labor.     Choose what type of pain relief you would like during labor.  You can choose from a few types, including medicine and non-medicine options. You may want to use several types of pain relief.     Know how medicines can help with pain during labor.  Some medicines lower anxiety and help with some of the pain. Others make your lower body numb so that you will feel less pain.     Tell your doctor about your pain medicine choice.  Do this before you start labor or very early in your labor. You may be able to change your mind during labor.     Learn about the stages of labor.    The first stage includes the early (latent) and active phases of labor. Contractions start in early labor. During active labor, contractions get stronger, last longer, and happen more often. And the cervix opens more rapidly.  The second stage starts when you're ready to push. During this stage, your baby is born.  During the third stage, you'll usually have a few more contractions to push out the placenta.   Follow-up care is a key part of your treatment and safety. Be sure to make and go to all appointments, and call your doctor if you are  "having problems. It's also a good idea to know your test results and keep a list of the medicines you take.  Where can you learn more?  Go to https://www.Coin.net/patiented  Enter B912 in the search box to learn more about \"Weeks 34 to 36 of Your Pregnancy: Care Instructions.\"  Current as of: 2023               Content Version: 13.8    0946-7513 [employer].   Care instructions adapted under license by your healthcare professional. If you have questions about a medical condition or this instruction, always ask your healthcare professional. [employer] disclaims any warranty or liability for your use of this information.      Group B Strep During Pregnancy: Care Instructions  Overview     Group B strep infection is caused by a type of bacteria. It's a different kind of bacteria than the kind that causes strep throat.  You may have this kind of bacteria in your body. Sometimes it may cause an infection, but most of the time it doesn't make you sick or cause symptoms. But if you pass the bacteria to your baby during the birth, it can cause serious health problems for your baby.  If you have this bacteria in your body, you will get antibiotics when you are in labor. Antibiotics help prevent problems for a  baby.  After birth, doctors will watch and may test your baby. If your baby tests positive for Group B strep, your baby will get antibiotics.  If you plan to breastfeed your baby, don't worry. It will be safe to breastfeed.  Follow-up care is a key part of your treatment and safety. Be sure to make and go to all appointments, and call your doctor if you are having problems. It's also a good idea to know your test results and keep a list of the medicines you take.  How can you care for yourself at home?  If your doctor has prescribed antibiotics, take them as directed. Do not stop taking them just because you feel better. You need to take the full course of " "antibiotics.  Tell your doctor if you are allergic to any antibiotic.  If you go into labor, or your water breaks, go to the hospital. Your doctor will give you antibiotics to help protect your baby from infection.  Tell the doctors and nurses if you have an allergy to penicillin.  Tell the doctors and nurses at the hospital that you tested positive for group B strep.  When should you call for help?   Call your doctor now or seek immediate medical care if:    You have symptoms of a urinary tract infection. These may include:  Pain or burning when you urinate.  A frequent need to urinate without being able to pass much urine.  Pain in the flank, which is just below the rib cage and above the waist on either side of the back.  Blood in your urine.  A fever.     You think you are in labor or your water has broken.     You have pain in your belly or pelvis.   Watch closely for changes in your health, and be sure to contact your doctor if you have any problems.  Where can you learn more?  Go to https://www.Raise.net/patiented  Enter M001 in the search box to learn more about \"Group B Strep During Pregnancy: Care Instructions.\"  Current as of: June 13, 2023               Content Version: 13.8    1312-7679 [employer].   Care instructions adapted under license by your healthcare professional. If you have questions about a medical condition or this instruction, always ask your healthcare professional. [employer] disclaims any warranty or liability for your use of this information.      "

## 2023-12-11 NOTE — PROGRESS NOTES
Patient reports that she continues to feel well.  She denies leakage of fluid or bleeding, denies right upper quadrant pain, headaches or blurred vision.  She has no lower extremity edema.  She reports good fetal movement.  Delivery preferences were discussed today.  Patient has had an epidural in the past, and has labor naturally.  She is open to various options but is hoping to labor naturally.  We reviewed who and when to call if she feels that she is in labor or experiences  labor symptoms.  She is measuring small again today and has a history of small babies.  Growth ultrasound ordered today.  She will follow-up in 2 weeks with my partner and then weekly thereafter.    Concerns: None  Doing well.  No concerns today.  No vaginal bleeding, LOF.  No contractions.  Cervix check next visit.  Discussed kick counts and fetal movement.  Reportable signs and symptoms discussed.  Discussed PTL, PROM, and when to call or come in.  RTC 2 weeks.    Felicia Umana MD

## 2023-12-15 ENCOUNTER — ANCILLARY PROCEDURE (OUTPATIENT)
Dept: ULTRASOUND IMAGING | Facility: CLINIC | Age: 28
End: 2023-12-15
Attending: FAMILY MEDICINE
Payer: COMMERCIAL

## 2023-12-15 DIAGNOSIS — O34.219 PREVIOUS CESAREAN DELIVERY, ANTEPARTUM CONDITION OR COMPLICATION: ICD-10-CM

## 2023-12-15 DIAGNOSIS — O09.93 SUPERVISION OF HIGH RISK PREGNANCY IN THIRD TRIMESTER: ICD-10-CM

## 2023-12-15 PROCEDURE — 76816 OB US FOLLOW-UP PER FETUS: CPT | Mod: TC | Performed by: RADIOLOGY

## 2023-12-26 ENCOUNTER — PRENATAL OFFICE VISIT (OUTPATIENT)
Dept: FAMILY MEDICINE | Facility: CLINIC | Age: 28
End: 2023-12-26
Payer: COMMERCIAL

## 2023-12-26 VITALS
WEIGHT: 143.13 LBS | DIASTOLIC BLOOD PRESSURE: 82 MMHG | SYSTOLIC BLOOD PRESSURE: 131 MMHG | BODY MASS INDEX: 29.01 KG/M2 | HEART RATE: 81 BPM

## 2023-12-26 DIAGNOSIS — O09.93 SUPERVISION OF HIGH RISK PREGNANCY IN THIRD TRIMESTER: Primary | ICD-10-CM

## 2023-12-26 DIAGNOSIS — Z3A.36 36 WEEKS GESTATION OF PREGNANCY: ICD-10-CM

## 2023-12-26 PROCEDURE — 99207 PR PRENATAL VISIT: CPT | Performed by: FAMILY MEDICINE

## 2023-12-26 PROCEDURE — 87653 STREP B DNA AMP PROBE: CPT | Performed by: FAMILY MEDICINE

## 2023-12-26 NOTE — PROGRESS NOTES
Assessment & Plan       1. Supervision of high risk pregnancy in third trimester  2. 36 weeks gestation of pregnancy  - Group B strep PCR     Ladonna is doing well today.  No concerning symptoms.  Feeling regular fetal movement.  Blood pressure is appropriate.  Growth normal on ultrasound.  Vertex today by bedside ultrasound and vaginal exam.  GBS updated today.  Follow-up with Dr. Umana as scheduled.     36w6d   O positive    [facility]    Subjective   Ladonna is a 28 year old, presenting for the following health issues:  Prenatal Care    HPI     Choroid plexus cyst has resolved on surveillance imaging.   Dealing with cold, urinary incontinence present.   Planning .      Review of Systems   No headaches no vision changes, no chest pain, no shortness of breath, no right upper quadrant abdominal pain, no cramping, no bleeding, no leaking fluids, no lower extremity edema.  Feeling regular fetal movement.      Objective    /82 (BP Location: Left arm, Patient Position: Sitting, Cuff Size: Adult Regular)   Pulse 81   Wt 64.9 kg (143 lb 2 oz)   LMP 2023 (Within Weeks)   BMI 29.01 kg/m    Body mass index is 29.01 kg/m .  Physical Exam   GENERAL: Ladonna is a pleasant, nontoxic gravid female.  ABDOMEN: Fundal height 35 cm, fetal heart tones 135 bpm, vertex by bedside ultrasound.   CERVIX: 1 cm dilated, 70% effaced, posterior, soft and stretchy.  EXTREMITIES: No lower extremity edema, pulses intact.

## 2023-12-27 LAB — GP B STREP DNA SPEC QL NAA+PROBE: NEGATIVE

## 2023-12-28 NOTE — RESULT ENCOUNTER NOTE
Patient updated by YapTime message with lab results.   Dr. Dong HOLGUIN      GBS test negative.  [facility], DO

## 2024-01-03 ENCOUNTER — HOSPITAL ENCOUNTER (EMERGENCY)
Facility: HOSPITAL | Age: 29
Discharge: HOME | End: 2024-01-04
Attending: EMERGENCY MEDICINE | Admitting: EMERGENCY MEDICINE
Payer: COMMERCIAL

## 2024-01-03 ENCOUNTER — APPOINTMENT (OUTPATIENT)
Dept: RADIOLOGY | Facility: HOSPITAL | Age: 29
End: 2024-01-03
Attending: EMERGENCY MEDICINE
Payer: COMMERCIAL

## 2024-01-03 ENCOUNTER — NURSE TRIAGE (OUTPATIENT)
Dept: NURSING | Facility: CLINIC | Age: 29
End: 2024-01-03
Payer: COMMERCIAL

## 2024-01-03 DIAGNOSIS — R07.81 PLEURITIC CHEST PAIN: ICD-10-CM

## 2024-01-03 DIAGNOSIS — M79.18 MUSCULOSKELETAL PAIN: ICD-10-CM

## 2024-01-03 LAB
ANION GAP SERPL CALCULATED.3IONS-SCNC: 11 MMOL/L (ref 7–15)
BASOPHILS # BLD AUTO: 0.1 10E3/UL (ref 0–0.2)
BASOPHILS NFR BLD AUTO: 1 %
BUN SERPL-MCNC: 7.3 MG/DL (ref 6–20)
CALCIUM SERPL-MCNC: 9 MG/DL (ref 8.6–10)
CHLORIDE SERPL-SCNC: 102 MMOL/L (ref 98–107)
CREAT SERPL-MCNC: 0.54 MG/DL (ref 0.51–0.95)
DEPRECATED HCO3 PLAS-SCNC: 22 MMOL/L (ref 22–29)
EGFRCR SERPLBLD CKD-EPI 2021: >90 ML/MIN/1.73M2
EOSINOPHIL # BLD AUTO: 0.1 10E3/UL (ref 0–0.7)
EOSINOPHIL NFR BLD AUTO: 1 %
ERYTHROCYTE [DISTWIDTH] IN BLOOD BY AUTOMATED COUNT: 12.3 % (ref 10–15)
GLUCOSE SERPL-MCNC: 88 MG/DL (ref 70–99)
HCT VFR BLD AUTO: 41.3 % (ref 35–47)
HGB BLD-MCNC: 14 G/DL (ref 11.7–15.7)
IMM GRANULOCYTES # BLD: 0.1 10E3/UL
IMM GRANULOCYTES NFR BLD: 1 %
LYMPHOCYTES # BLD AUTO: 1.7 10E3/UL (ref 0.8–5.3)
LYMPHOCYTES NFR BLD AUTO: 19 %
MCH RBC QN AUTO: 31.4 PG (ref 26.5–33)
MCHC RBC AUTO-ENTMCNC: 33.9 G/DL (ref 31.5–36.5)
MCV RBC AUTO: 93 FL (ref 78–100)
MONOCYTES # BLD AUTO: 0.7 10E3/UL (ref 0–1.3)
MONOCYTES NFR BLD AUTO: 8 %
NEUTROPHILS # BLD AUTO: 6.5 10E3/UL (ref 1.6–8.3)
NEUTROPHILS NFR BLD AUTO: 70 %
NRBC # BLD AUTO: 0 10E3/UL
NRBC BLD AUTO-RTO: 0 /100
PLATELET # BLD AUTO: 297 10E3/UL (ref 150–450)
POTASSIUM SERPL-SCNC: 3.7 MMOL/L (ref 3.4–5.3)
RBC # BLD AUTO: 4.46 10E6/UL (ref 3.8–5.2)
SODIUM SERPL-SCNC: 135 MMOL/L (ref 135–145)
WBC # BLD AUTO: 9.1 10E3/UL (ref 4–11)

## 2024-01-03 PROCEDURE — 85379 FIBRIN DEGRADATION QUANT: CPT | Performed by: EMERGENCY MEDICINE

## 2024-01-03 PROCEDURE — 93005 ELECTROCARDIOGRAM TRACING: CPT | Performed by: STUDENT IN AN ORGANIZED HEALTH CARE EDUCATION/TRAINING PROGRAM

## 2024-01-03 PROCEDURE — 99285 EMERGENCY DEPT VISIT HI MDM: CPT | Mod: 25

## 2024-01-03 PROCEDURE — 93005 ELECTROCARDIOGRAM TRACING: CPT | Performed by: EMERGENCY MEDICINE

## 2024-01-03 PROCEDURE — 71045 X-RAY EXAM CHEST 1 VIEW: CPT

## 2024-01-03 PROCEDURE — 36415 COLL VENOUS BLD VENIPUNCTURE: CPT | Performed by: FAMILY MEDICINE

## 2024-01-03 PROCEDURE — 250N000013 HC RX MED GY IP 250 OP 250 PS 637: Performed by: EMERGENCY MEDICINE

## 2024-01-03 PROCEDURE — 36415 COLL VENOUS BLD VENIPUNCTURE: CPT | Performed by: EMERGENCY MEDICINE

## 2024-01-03 PROCEDURE — 85025 COMPLETE CBC W/AUTO DIFF WBC: CPT | Performed by: FAMILY MEDICINE

## 2024-01-03 PROCEDURE — 80048 BASIC METABOLIC PNL TOTAL CA: CPT | Performed by: FAMILY MEDICINE

## 2024-01-03 RX ORDER — LIDOCAINE 4 G/G
1 PATCH TOPICAL ONCE
Status: DISCONTINUED | OUTPATIENT
Start: 2024-01-03 | End: 2024-01-04 | Stop reason: HOSPADM

## 2024-01-03 RX ADMIN — LIDOCAINE 1 PATCH: 4 PATCH TOPICAL at 22:37

## 2024-01-03 ASSESSMENT — ACTIVITIES OF DAILY LIVING (ADL): ADLS_ACUITY_SCORE: 35

## 2024-01-03 NOTE — TELEPHONE ENCOUNTER
Triage call  Patient calling she has cold for 2-3 weeks now her left rib is really bothering her when she coughs and it  has been starting to bother her more it is a sharp pain and she rates a 5/10 if sh has tylenol but a 8/10 with no medication.    Per protocol see in office today she has a appointment tomorrow so she has chosen to wait until tomorrow ranter than go to urgent care.  Care advice given.  Verbalizes understanding and agrees with plan.    Monet Hebert RN   [facility] Nurse Advisor  1:33 PM 1/3/2024    Reason for Disposition   Patient wants to be seen    Additional Information   Negative: SEVERE difficulty breathing (e.g., struggling for each breath, speaks in single words)   Negative: Difficult to awaken or acting confused (e.g., disoriented, slurred speech)   Negative: Shock suspected (e.g., cold/pale/clammy skin, too weak to stand, low BP, rapid pulse)   Negative: Passed out (i.e., lost consciousness, collapsed and was not responding)   Negative: Chest pain lasting longer than 5 minutes and ANY of the following:         Pain is crushing, pressure-like, or heavy         Over 44 years old          Over 30 years old and one cardiac risk factor (e.g diabetes, high blood pressure, high cholesterol, smoker, or family history of heart disease)         History of heart disease (e.g. angina, heart attack, heart failure, bypass surgery, takes nitroglycerin)   Negative: Heart beating < 50 beats per minute OR > 140 beats per minute   Negative: Visible sweat on face or sweat dripping down face   Negative: Sounds like a life-threatening emergency to the triager   Negative: Followed an injury to chest   Negative: SEVERE chest pain   Negative: Pain also in shoulder(s) or arm(s) or jaw   Negative: Difficulty breathing   Negative: Cocaine use within last 3 days   Negative: Major surgery in the past month   Negative: Hip or leg fracture (broken bone) in past month (or had cast on leg or ankle in past  month)   Negative: Illness requiring prolonged bedrest in past month (e.g., immobilization, long hospital stay)   Negative: Long-distance travel in past month (e.g., car, bus, train, plane; with trip lasting 6 or more hours)   Negative: History of prior 'blood clot' in leg or lungs (i.e., deep vein thrombosis, pulmonary embolism)   Negative: History of inherited increased risk of blood clots (e.g., Factor 5 Leiden, Anti-thrombin 3, Protein C or Protein S deficiency, Prothrombin mutation)   Negative: Cancer treatment in the past two months (or has cancer now)   Negative: Heart beating irregularly or very rapidly   Negative: Chest pain lasting longer than 5 minutes and occurred in last 3 days (72 hours) (Exception: Feels exactly the same as previously diagnosed heartburn and has accompanying sour taste in mouth.)   Negative: Chest pain or 'angina' comes and goes and is happening more often (increasing in frequency) or getting worse (increasing in severity) (Exception: Chest pains that last only a few seconds.)   Negative: Dizziness or lightheadedness   Negative: Coughing up blood   Negative: Patient sounds very sick or weak to the triager   Negative: Patient says chest pain feels exactly the same as previously diagnosed 'heartburn' and describes burning in chest and accompanying sour taste in mouth   Negative: Fever > 100.4 F (38.0 C)   Negative: Chest pain(s) lasting a few seconds persists > 3 days   Negative: Rash in same area as pain (may be described as 'small blisters')   Negative: All other patients with chest pain (Exception: Fleeting chest pain lasting a few seconds.)    Protocols used: Chest Pain-A-[place]

## 2024-01-04 ENCOUNTER — APPOINTMENT (OUTPATIENT)
Dept: CT IMAGING | Facility: HOSPITAL | Age: 29
End: 2024-01-04
Attending: EMERGENCY MEDICINE
Payer: COMMERCIAL

## 2024-01-04 ENCOUNTER — PRENATAL OFFICE VISIT (OUTPATIENT)
Dept: FAMILY MEDICINE | Facility: CLINIC | Age: 29
End: 2024-01-04
Payer: COMMERCIAL

## 2024-01-04 VITALS
DIASTOLIC BLOOD PRESSURE: 80 MMHG | SYSTOLIC BLOOD PRESSURE: 117 MMHG | OXYGEN SATURATION: 98 % | HEART RATE: 90 BPM | TEMPERATURE: 98 F | RESPIRATION RATE: 16 BRPM

## 2024-01-04 VITALS
BODY MASS INDEX: 29.03 KG/M2 | HEART RATE: 73 BPM | DIASTOLIC BLOOD PRESSURE: 78 MMHG | HEIGHT: 59 IN | SYSTOLIC BLOOD PRESSURE: 104 MMHG | OXYGEN SATURATION: 99 % | RESPIRATION RATE: 20 BRPM | WEIGHT: 144 LBS | TEMPERATURE: 97.7 F

## 2024-01-04 DIAGNOSIS — R07.89 LEFT-SIDED CHEST WALL PAIN: ICD-10-CM

## 2024-01-04 DIAGNOSIS — O09.93 SUPERVISION OF HIGH RISK PREGNANCY IN THIRD TRIMESTER: Primary | ICD-10-CM

## 2024-01-04 DIAGNOSIS — O34.219 PREVIOUS CESAREAN DELIVERY, ANTEPARTUM CONDITION OR COMPLICATION: ICD-10-CM

## 2024-01-04 LAB — D DIMER PPP FEU-MCNC: 0.66 UG/ML FEU (ref 0–0.5)

## 2024-01-04 PROCEDURE — 99207 PR PRENATAL VISIT: CPT | Performed by: FAMILY MEDICINE

## 2024-01-04 PROCEDURE — 250N000011 HC RX IP 250 OP 636: Performed by: EMERGENCY MEDICINE

## 2024-01-04 PROCEDURE — 71275 CT ANGIOGRAPHY CHEST: CPT

## 2024-01-04 RX ORDER — HYDROCODONE BITARTRATE AND ACETAMINOPHEN 5; 325 MG/1; MG/1
.5-1 TABLET ORAL EVERY 6 HOURS PRN
Qty: 10 TABLET | Refills: 0 | Status: SHIPPED | OUTPATIENT
Start: 2024-01-04 | End: 2024-01-07

## 2024-01-04 RX ORDER — IOPAMIDOL 755 MG/ML
85 INJECTION, SOLUTION INTRAVASCULAR ONCE
Status: COMPLETED | OUTPATIENT
Start: 2024-01-04 | End: 2024-01-04

## 2024-01-04 RX ADMIN — IOPAMIDOL 80 ML: 755 INJECTION, SOLUTION INTRAVENOUS at 00:47

## 2024-01-04 ASSESSMENT — ACTIVITIES OF DAILY LIVING (ADL): ADLS_ACUITY_SCORE: 35

## 2024-01-04 NOTE — ED TRIAGE NOTES
Pt arrives to triage for shortness of breath that has been getting worse. Pt reports cough that has been ongoing for around 3 weeks. Pt has left sided back/rib pain as well. Pt is 38 weeks pregnant, denies contractions or water breaking. Pain in rib and side has progressed from dull and achy to sharp and stabbing over the last few days.

## 2024-01-04 NOTE — ED PROVIDER NOTES
EMERGENCY DEPARTMENT ENCOUNTER      NAME: Ladonna Casiano  AGE: 28 year old female  YOB: 1995  MRN: [id number]  EVALUATION DATE & TIME: 1/3/2024  9:47 PM    PCP: Felicia Umana    ED PROVIDER: Nilay Moore M.D.      Chief Complaint   Patient presents with    Shortness of Breath         FINAL IMPRESSION:  1. Pleuritic chest pain    2. Musculoskeletal pain          ED COURSE & MEDICAL DECISION MAKING:    Pertinent Labs & Imaging studies reviewed below.  All EKGs below represent my independent interpretation.   ED Course as of 24 0058   Wed  Patient is a 28-year-old woman,  at 38w1d who presents with stated pleuritic chest pain that is progressive for last few days, she has been coughing on and off with different respiratory infections that her family has had over the last month.  Pain has been getting worse and this evening it woke her up when she was coughing at night.  Given the emergency department she is somewhat uncomfortable appearing, has pain with rotation of the torso and with deep breath.  She is afebrile, normal respiratory rate.  Mildly tachycardic.  Differential includes pneumonia, pleurisy, musculoskeletal pain, because she is 38 weeks pregnant PE is also a differential.  Plan is upper chest x-ray, lidocaine patch.   0 WBC: 9.1   2248 XR Chest 1 View  Negative chest.   2304 We will proceed with D-dimer.  I discussed risk and benefits of getting a CT PE scan, and also the risks of missing a PE.  If the D-dimer is elevated we will proceed with a chest PE scan.   Thu 2024   0020 D-Dimer Quantitative(!): 0.66  CT PE scan ordered   Care signed out to the oncoming ED physician Dr. Gonzales.  Discussed pain management options with the patient if scan is normal.  Based on my interpretation of the CT images there is transfer there is no evidence of pneumonia, rib fracture, or PE.    Additional ED Course Timestamps:  10:12 PM I met with the patient,  obtained history, performed an initial exam, and discussed options and plan for diagnostics and treatment here in the ED.      Medical Decision Making  Obtained supplemental history:Supplemental history obtained?: Documented in chart  Reviewed external records: External records reviewed?: No  Care impacted by chronic illness:N/A  Care significantly affected by social determinants of health:N/A  Did you consider but not order tests?: Work up considered but not performed and documented in chart, if applicable  Did you interpret images independently?: Independent interpretation of ECG and images noted in documentation, when applicable.  Consultation discussion with other provider:Did you involve another provider (consultant, , pharmacy, etc.)?: No  Admission considered. Patient was signed out to the oncoming physician, disposition pending.    At the conclusion of the encounter I discussed the results of all of the tests and the disposition. The questions were answered. The patient or family acknowledged understanding and was agreeable with the care plan.     MEDICATIONS GIVEN IN THE EMERGENCY:  Medications   Lidocaine (LIDOCARE) 4 % Patch 1 patch (1 patch Transdermal $Patch/Med Applied 1/3/24 1111)   iopamidol (ISOVUE-370) solution 85 mL (80 mLs Intravenous $Given 1/4/24 0047)         NEW PRESCRIPTIONS STARTED AT TODAY'S ER VISIT  New Prescriptions    No medications on file          =================================================================    HPI    Patient information was obtained from: Patient    Use of : N/A         Ladonna Casiano is a 28 year old female with no pertinent medical history on file who presents to this ED for evaluation of shortness of breath.     The patient complains of left lower chest wall pain, that she noticed a few days ago. She has had a persistent cough over the last month, that she contracted from other sick relatives. Over the past few days, she has been having worsening  pain when she coughs. She awoke earlier from her sleep due to coughing. Patient reports no shortness of breath during initial examination.     Patient denies any leg pain or swelling, and no complications with her current pregnancy. She is currently 38 weeks pregnant. No history of blood clots.      VITALS:  BP (!) 126/91   Pulse 84   Temp 98.1  F (36.7  C) (Temporal)   Resp 30   LMP 03/29/2023 (Within Weeks)   SpO2 97%     PHYSICAL EXAM    Constitutional: Well developed, well nourished. Comfortable appearing.  HENT: Normocephalic, atraumatic, mucous membranes moist, nose normal. Neck- Supple, gross ROM intact.   Eyes: Pupils mid-range, conjunctiva without injection, no discharge.   Respiratory: Clear to auscultation bilaterally, no respiratory distress, no wheezing, speaks full sentences easily. No cough. Reproducible tenderness to left lateral inferior rib cage.    Cardiovascular: Mildly tachycardic, regular rhythm, no murmurs.   GI: Soft, no tenderness to deep palpation in all quadrants, no masses.  Musculoskeletal: Moving all 4 extremities intentionally and without pain. No obvious deformity.  Skin: Warm, dry, no rash.  Neurologic: Alert & oriented x 3, cranial nerves grossly intact.  Psychiatric: Affect normal, cooperative.      PROCEDURES:   None      I, Catalina Royal am serving as a scribe to document services personally performed by Dr. Nilay Moore based on my observation and the provider's statements to me. INilay MD attest that Catalina Royal is acting in a scribe capacity, has observed my performance of the services and has documented them in accordance with my direction.    Nilay Moore M.D.  Emergency Medicine  [facility] EMERGENCY DEPARTMENT  [facility] [facility] [id number]  [phone]  Dept: [phone]       Nilay Moore MD  01/04/24 0103

## 2024-01-04 NOTE — ED NOTES
EMERGENCY DEPARTMENT SIGN OUT NOTE        ED COURSE AND MEDICAL DECISION MAKING  Patient was signed out to me by Dr Aida Gonzales at 1:00 AM.    In brief, Ladonna Casiano is a 28 year old female who initially presented  for evaluation of shortness of breath.      The patient complains of left lower chest wall pain, that she noticed a few days ago. She has had a persistent cough over the last month, that she contracted from other sick relatives. Over the past few days, she has been having worsening pain when she coughs. She awoke earlier from her sleep due to coughing. Patient reports no shortness of breath during initial examination.      Patient denies any leg pain or swelling, and no complications with her current pregnancy. She is currently 38 weeks pregnant. No history of blood clots.      At time of sign out, disposition was pending CT chest imaging.  CT PE study obtained and negative.  Safe for discharge to home.    FINAL IMPRESSION    1. Pleuritic chest pain    2. Musculoskeletal pain        ED MEDS  Medications   Lidocaine (LIDOCARE) 4 % Patch 1 patch (1 patch Transdermal $Patch/Med Applied 1/3/24 1903)   iopamidol (ISOVUE-370) solution 85 mL (80 mLs Intravenous $Given 1/4/24 0049)       LAB  Labs Ordered and Resulted from Time of ED Arrival to Time of ED Departure   D DIMER QUANTITATIVE - Abnormal       Result Value    D-Dimer Quantitative 0.66 (*)    BASIC METABOLIC PANEL - Normal    Sodium 135      Potassium 3.7      Chloride 102      Carbon Dioxide (CO2) 22      Anion Gap 11      Urea Nitrogen 7.3      Creatinine 0.54      GFR Estimate >90      Calcium 9.0      Glucose 88     CBC WITH PLATELETS AND DIFFERENTIAL    WBC Count 9.1      RBC Count 4.46      Hemoglobin 14.0      Hematocrit 41.3      MCV 93      MCH 31.4      MCHC 33.9      RDW 12.3      Platelet Count 297      % Neutrophils 70      % Lymphocytes 19      % Monocytes 8      % Eosinophils 1      % Basophils 1      % Immature Granulocytes 1       NRBCs per 100 WBC 0      Absolute Neutrophils 6.5      Absolute Lymphocytes 1.7      Absolute Monocytes 0.7      Absolute Eosinophils 0.1      Absolute Basophils 0.1      Absolute Immature Granulocytes 0.1      Absolute NRBCs 0.0         RADIOLOGY    XR Chest 1 View   Final Result   IMPRESSION: Negative chest.      CT Chest Pulmonary Embolism w Contrast    (Results Pending)       DISCHARGE MEDS  New Prescriptions    No medications on file       Aida Gonzales MD  [facility] EMERGENCY DEPARTMENT  [address] [place] [id number] [phone]       Aida Gonzales MD  01/04/24 0106

## 2024-01-06 NOTE — PROGRESS NOTES
Patient was in the ED last night for evaluation of the sudden onset of sharp, left-sided chest wall pain.  She has been coughing for a couple of weeks due to a respiratory infection, but awoke last night with sharp left-sided chest wall pain after coughing.  She was evaluated in the ED with labs which were unremarkable with the exception of a mildly elevated d-dimer.  CT chest PE run was negative.  She continues to be in severe pain when she is sitting up, changing positions, with deep breathing and cough.  She has chest wall tenderness and no blood in the urine, making a urologic issue less likely.  We discussed the risks versus benefits of narcotic medication for pain today and a small supply of hydrocodone was given.  She is aware that this can cross the placenta and will use extremely sparingly.  If no improvement of her pain, would consider ultrasound of her kidney/ureter on the left.  Concerns: chest wall pain left  No vaginal bleeding, LOF, contractions.  No HA, RUQ pain, N/V, visual changes.  Cervix is 2.5/70%/-2, soft, posterior.  Cephalic position confirmed by Leopold maneuvers and cervical exam.  Discussed indications, risks, complications and failure rate of inductions.  Discussed signs of labor and when to call or come in.  Discussed kick counts and fetal movement.  Reportable signs and symptoms discussed.  Labor precautions discussed.  RTC 1 week.    Felicia Umana MD

## 2024-01-08 LAB
ATRIAL RATE - MUSE: 88 BPM
DIASTOLIC BLOOD PRESSURE - MUSE: NORMAL MMHG
INTERPRETATION ECG - MUSE: NORMAL
P AXIS - MUSE: 51 DEGREES
PR INTERVAL - MUSE: 136 MS
QRS DURATION - MUSE: 76 MS
QT - MUSE: 342 MS
QTC - MUSE: 413 MS
R AXIS - MUSE: 49 DEGREES
SYSTOLIC BLOOD PRESSURE - MUSE: NORMAL MMHG
T AXIS - MUSE: 0 DEGREES
VENTRICULAR RATE- MUSE: 88 BPM

## 2024-01-11 ENCOUNTER — PRENATAL OFFICE VISIT (OUTPATIENT)
Dept: FAMILY MEDICINE | Facility: CLINIC | Age: 29
End: 2024-01-11
Payer: COMMERCIAL

## 2024-01-11 VITALS
RESPIRATION RATE: 20 BRPM | HEART RATE: 107 BPM | DIASTOLIC BLOOD PRESSURE: 80 MMHG | HEIGHT: 59 IN | OXYGEN SATURATION: 99 % | SYSTOLIC BLOOD PRESSURE: 124 MMHG | TEMPERATURE: 98.3 F | WEIGHT: 142.2 LBS | BODY MASS INDEX: 28.67 KG/M2

## 2024-01-11 DIAGNOSIS — O09.93 SUPERVISION OF HIGH RISK PREGNANCY IN THIRD TRIMESTER: Primary | ICD-10-CM

## 2024-01-11 DIAGNOSIS — O34.219 PREVIOUS CESAREAN DELIVERY, ANTEPARTUM CONDITION OR COMPLICATION: ICD-10-CM

## 2024-01-11 PROCEDURE — 99207 PR PRENATAL VISIT: CPT | Performed by: FAMILY MEDICINE

## 2024-01-12 ENCOUNTER — HOSPITAL ENCOUNTER (INPATIENT)
Facility: HOSPITAL | Age: 29
LOS: 1 days | Discharge: HOME | End: 2024-01-13
Attending: FAMILY MEDICINE | Admitting: FAMILY MEDICINE
Payer: COMMERCIAL

## 2024-01-12 PROBLEM — Z34.90 PREGNANCY: Status: ACTIVE | Noted: 2024-01-12

## 2024-01-12 LAB
ABO/RH(D): NORMAL
ANTIBODY SCREEN: NEGATIVE
HGB BLD-MCNC: 14.7 G/DL (ref 11.7–15.7)
HOLD SPECIMEN: NORMAL
HOLD SPECIMEN: NORMAL
SPECIMEN EXPIRATION DATE: NORMAL
T PALLIDUM AB SER QL: NONREACTIVE

## 2024-01-12 PROCEDURE — 120N000001 HC R&B MED SURG/OB

## 2024-01-12 PROCEDURE — 250N000013 HC RX MED GY IP 250 OP 250 PS 637: Performed by: FAMILY MEDICINE

## 2024-01-12 PROCEDURE — 86780 TREPONEMA PALLIDUM: CPT | Performed by: FAMILY MEDICINE

## 2024-01-12 PROCEDURE — 250N000009 HC RX 250: Performed by: FAMILY MEDICINE

## 2024-01-12 PROCEDURE — 36415 COLL VENOUS BLD VENIPUNCTURE: CPT | Performed by: FAMILY MEDICINE

## 2024-01-12 PROCEDURE — 86900 BLOOD TYPING SEROLOGIC ABO: CPT | Performed by: FAMILY MEDICINE

## 2024-01-12 PROCEDURE — 250N000011 HC RX IP 250 OP 636: Performed by: FAMILY MEDICINE

## 2024-01-12 PROCEDURE — 722N000001 HC LABOR CARE VAGINAL DELIVERY SINGLE

## 2024-01-12 PROCEDURE — 85018 HEMOGLOBIN: CPT | Performed by: FAMILY MEDICINE

## 2024-01-12 PROCEDURE — 59400 OBSTETRICAL CARE: CPT | Performed by: FAMILY MEDICINE

## 2024-01-12 RX ORDER — NALOXONE HYDROCHLORIDE 0.4 MG/ML
0.2 INJECTION, SOLUTION INTRAMUSCULAR; INTRAVENOUS; SUBCUTANEOUS
Status: DISCONTINUED | OUTPATIENT
Start: 2024-01-12 | End: 2024-01-12 | Stop reason: HOSPADM

## 2024-01-12 RX ORDER — OXYTOCIN 10 [USP'U]/ML
10 INJECTION, SOLUTION INTRAMUSCULAR; INTRAVENOUS
Status: DISCONTINUED | OUTPATIENT
Start: 2024-01-12 | End: 2024-01-13 | Stop reason: HOSPADM

## 2024-01-12 RX ORDER — METHYLERGONOVINE MALEATE 0.2 MG/ML
200 INJECTION INTRAVENOUS
Status: DISCONTINUED | OUTPATIENT
Start: 2024-01-12 | End: 2024-01-13 | Stop reason: HOSPADM

## 2024-01-12 RX ORDER — MISOPROSTOL 200 UG/1
400 TABLET ORAL
Status: DISCONTINUED | OUTPATIENT
Start: 2024-01-12 | End: 2024-01-13 | Stop reason: HOSPADM

## 2024-01-12 RX ORDER — KETOROLAC TROMETHAMINE 30 MG/ML
30 INJECTION, SOLUTION INTRAMUSCULAR; INTRAVENOUS
Status: DISCONTINUED | OUTPATIENT
Start: 2024-01-12 | End: 2024-01-13 | Stop reason: HOSPADM

## 2024-01-12 RX ORDER — CARBOPROST TROMETHAMINE 250 UG/ML
250 INJECTION, SOLUTION INTRAMUSCULAR
Status: DISCONTINUED | OUTPATIENT
Start: 2024-01-12 | End: 2024-01-13 | Stop reason: HOSPADM

## 2024-01-12 RX ORDER — PROCHLORPERAZINE MALEATE 10 MG
10 TABLET ORAL EVERY 6 HOURS PRN
Status: DISCONTINUED | OUTPATIENT
Start: 2024-01-12 | End: 2024-01-12 | Stop reason: HOSPADM

## 2024-01-12 RX ORDER — PROCHLORPERAZINE 25 MG
25 SUPPOSITORY, RECTAL RECTAL EVERY 12 HOURS PRN
Status: DISCONTINUED | OUTPATIENT
Start: 2024-01-12 | End: 2024-01-12 | Stop reason: HOSPADM

## 2024-01-12 RX ORDER — CITRIC ACID/SODIUM CITRATE 334-500MG
30 SOLUTION, ORAL ORAL
Status: DISCONTINUED | OUTPATIENT
Start: 2024-01-12 | End: 2024-01-12 | Stop reason: HOSPADM

## 2024-01-12 RX ORDER — CARBOPROST TROMETHAMINE 250 UG/ML
250 INJECTION, SOLUTION INTRAMUSCULAR
Status: DISCONTINUED | OUTPATIENT
Start: 2024-01-12 | End: 2024-01-12 | Stop reason: HOSPADM

## 2024-01-12 RX ORDER — OXYTOCIN/0.9 % SODIUM CHLORIDE 30/500 ML
340 PLASTIC BAG, INJECTION (ML) INTRAVENOUS CONTINUOUS PRN
Status: DISCONTINUED | OUTPATIENT
Start: 2024-01-12 | End: 2024-01-12 | Stop reason: HOSPADM

## 2024-01-12 RX ORDER — MISOPROSTOL 200 UG/1
800 TABLET ORAL
Status: DISCONTINUED | OUTPATIENT
Start: 2024-01-12 | End: 2024-01-12 | Stop reason: HOSPADM

## 2024-01-12 RX ORDER — HYDROCORTISONE 25 MG/G
CREAM TOPICAL 3 TIMES DAILY PRN
Status: DISCONTINUED | OUTPATIENT
Start: 2024-01-12 | End: 2024-01-13 | Stop reason: HOSPADM

## 2024-01-12 RX ORDER — MISOPROSTOL 200 UG/1
800 TABLET ORAL
Status: DISCONTINUED | OUTPATIENT
Start: 2024-01-12 | End: 2024-01-13 | Stop reason: HOSPADM

## 2024-01-12 RX ORDER — NALOXONE HYDROCHLORIDE 0.4 MG/ML
0.4 INJECTION, SOLUTION INTRAMUSCULAR; INTRAVENOUS; SUBCUTANEOUS
Status: DISCONTINUED | OUTPATIENT
Start: 2024-01-12 | End: 2024-01-12 | Stop reason: HOSPADM

## 2024-01-12 RX ORDER — MISOPROSTOL 200 UG/1
400 TABLET ORAL
Status: DISCONTINUED | OUTPATIENT
Start: 2024-01-12 | End: 2024-01-12 | Stop reason: HOSPADM

## 2024-01-12 RX ORDER — METOCLOPRAMIDE HYDROCHLORIDE 5 MG/ML
10 INJECTION INTRAMUSCULAR; INTRAVENOUS EVERY 6 HOURS PRN
Status: DISCONTINUED | OUTPATIENT
Start: 2024-01-12 | End: 2024-01-12 | Stop reason: HOSPADM

## 2024-01-12 RX ORDER — ONDANSETRON 4 MG/1
4 TABLET, ORALLY DISINTEGRATING ORAL EVERY 6 HOURS PRN
Status: DISCONTINUED | OUTPATIENT
Start: 2024-01-12 | End: 2024-01-12 | Stop reason: HOSPADM

## 2024-01-12 RX ORDER — BISACODYL 10 MG
10 SUPPOSITORY, RECTAL RECTAL DAILY PRN
Status: DISCONTINUED | OUTPATIENT
Start: 2024-01-12 | End: 2024-01-13 | Stop reason: HOSPADM

## 2024-01-12 RX ORDER — IBUPROFEN 800 MG/1
800 TABLET, FILM COATED ORAL EVERY 6 HOURS PRN
Status: DISCONTINUED | OUTPATIENT
Start: 2024-01-12 | End: 2024-01-13 | Stop reason: HOSPADM

## 2024-01-12 RX ORDER — TRANEXAMIC ACID 10 MG/ML
1 INJECTION, SOLUTION INTRAVENOUS EVERY 30 MIN PRN
Status: DISCONTINUED | OUTPATIENT
Start: 2024-01-12 | End: 2024-01-13 | Stop reason: HOSPADM

## 2024-01-12 RX ORDER — METOCLOPRAMIDE 10 MG/1
10 TABLET ORAL EVERY 6 HOURS PRN
Status: DISCONTINUED | OUTPATIENT
Start: 2024-01-12 | End: 2024-01-12 | Stop reason: HOSPADM

## 2024-01-12 RX ORDER — METHYLERGONOVINE MALEATE 0.2 MG/ML
200 INJECTION INTRAVENOUS
Status: DISCONTINUED | OUTPATIENT
Start: 2024-01-12 | End: 2024-01-12 | Stop reason: HOSPADM

## 2024-01-12 RX ORDER — MODIFIED LANOLIN
OINTMENT (GRAM) TOPICAL
Status: DISCONTINUED | OUTPATIENT
Start: 2024-01-12 | End: 2024-01-13 | Stop reason: HOSPADM

## 2024-01-12 RX ORDER — FENTANYL CITRATE 50 UG/ML
100 INJECTION, SOLUTION INTRAMUSCULAR; INTRAVENOUS
Status: DISCONTINUED | OUTPATIENT
Start: 2024-01-12 | End: 2024-01-12 | Stop reason: HOSPADM

## 2024-01-12 RX ORDER — IBUPROFEN 800 MG/1
800 TABLET, FILM COATED ORAL
Status: DISCONTINUED | OUTPATIENT
Start: 2024-01-12 | End: 2024-01-13 | Stop reason: HOSPADM

## 2024-01-12 RX ORDER — DOCUSATE SODIUM 100 MG/1
100 CAPSULE, LIQUID FILLED ORAL DAILY
Status: DISCONTINUED | OUTPATIENT
Start: 2024-01-12 | End: 2024-01-13 | Stop reason: HOSPADM

## 2024-01-12 RX ORDER — ONDANSETRON 2 MG/ML
4 INJECTION INTRAMUSCULAR; INTRAVENOUS EVERY 6 HOURS PRN
Status: DISCONTINUED | OUTPATIENT
Start: 2024-01-12 | End: 2024-01-12 | Stop reason: HOSPADM

## 2024-01-12 RX ORDER — OXYTOCIN 10 [USP'U]/ML
10 INJECTION, SOLUTION INTRAMUSCULAR; INTRAVENOUS
Status: DISCONTINUED | OUTPATIENT
Start: 2024-01-12 | End: 2024-01-12 | Stop reason: HOSPADM

## 2024-01-12 RX ORDER — OXYTOCIN/0.9 % SODIUM CHLORIDE 30/500 ML
100-340 PLASTIC BAG, INJECTION (ML) INTRAVENOUS CONTINUOUS PRN
Status: DISCONTINUED | OUTPATIENT
Start: 2024-01-12 | End: 2024-01-13 | Stop reason: HOSPADM

## 2024-01-12 RX ORDER — ACETAMINOPHEN 325 MG/1
650 TABLET ORAL EVERY 4 HOURS PRN
Status: DISCONTINUED | OUTPATIENT
Start: 2024-01-12 | End: 2024-01-13 | Stop reason: HOSPADM

## 2024-01-12 RX ORDER — OXYTOCIN/0.9 % SODIUM CHLORIDE 30/500 ML
340 PLASTIC BAG, INJECTION (ML) INTRAVENOUS CONTINUOUS PRN
Status: DISCONTINUED | OUTPATIENT
Start: 2024-01-12 | End: 2024-01-13 | Stop reason: HOSPADM

## 2024-01-12 RX ORDER — TRANEXAMIC ACID 10 MG/ML
1 INJECTION, SOLUTION INTRAVENOUS EVERY 30 MIN PRN
Status: DISCONTINUED | OUTPATIENT
Start: 2024-01-12 | End: 2024-01-12 | Stop reason: HOSPADM

## 2024-01-12 RX ADMIN — IBUPROFEN 800 MG: 800 TABLET ORAL at 19:35

## 2024-01-12 RX ADMIN — DOCUSATE SODIUM 100 MG: 100 CAPSULE, LIQUID FILLED ORAL at 12:14

## 2024-01-12 RX ADMIN — KETOROLAC TROMETHAMINE 30 MG: 30 INJECTION, SOLUTION INTRAMUSCULAR; INTRAVENOUS at 12:14

## 2024-01-12 RX ADMIN — Medication 340 ML/HR: at 11:04

## 2024-01-12 ASSESSMENT — ACTIVITIES OF DAILY LIVING (ADL)
ADLS_ACUITY_SCORE: 35
ADLS_ACUITY_SCORE: 20

## 2024-01-12 NOTE — L&D DELIVERY NOTE
OB Vaginal Delivery Note    Ladonna Casiano MRN# [id number]   Age: 28 year old YOB: 1995       GA: 39w2d  GP:   Labor Complications: None   Delivery QBL: 25 mL  Delivery Type: Vaginal, Spontaneous   ROM to Delivery Time: rupture date or rupture time have not been documented   Weight:     1 Minute 5 Minute 10 Minute   Apgar Totals: 8     9      ANDREA MALHOTRA;JEROME BHATIA     Delivery Details:  Ladonna Casiano, a 28 year old  female delivered a viable infant with apgars of 8  and 9 . Patient was fully dilated and pushing after  3 hours  33 minutes in active labor. Delivery was via vaginal, spontaneous  to a sterile field under no   anesthesia. Infant delivered in vertex    occiput  anterior  position. Anterior and posterior shoulders delivered without difficulty. The cord was clamped, cut twice and 3 vessels  were noted. Cord blood was obtained in routine fashion with the following disposition: lab .      Fetal head was felt to be acynclitic in the vaginal vault and patient had deceleration to 90's with recovery between pushes with left lateral position.  With next push, little progress was observed and fetal heart tones descended to the 90's again.  Upon attempting to transition patient to hands and knees position, she felt another urge to push and rapid fetal descent was observed.  Delivery occurred with this contraction over about 3 pushes in dorsal lithotomy position.  Baby was vigorous after delivery.    Cord complications: none   Placenta delivered at 2024 11:07 AM . Placental disposition was Hospital disposal . Fundal massage performed and fundus found to be firm.     Episiotomy: none    Perineum, vagina, cervix were inspected, and the following lacerations were noted:   Perineal lacerations: none                Excellent hemostasis was noted. Needle count correct. Infant and patient in delivery room in good and stable condition.        Oh Female-Ladonna [[id number]]       Labor Event Times      Latent labor onset date/time: 2024 0730    Active labor onset date: 24 Onset time: 10:12 AM   Dilation complete date: 24 Complete time: 10:54 AM   Start pushing date/time: 2024 1054        Augmentation: None       Delivery/Placenta Date and Time      Delivery Date: 24 Delivery Time: 11:03 AM   Placenta Date/Time: 2024 11:07 AM  Oxytocin given at the time of delivery: after delivery of baby  Delivering clinician: Felicia Umana MD   Other personnel present at delivery:  Provider Role   Emelina Flores RN    [place], Irina TAMAYO RN              Vaginal Counts       Initial count performed by 2 team members:  Two Team Members   viridiana flores         Edgar Suture Needles Sponges (RETIRED) Instruments   Initial counts   5    Added to count       Relief counts       Final counts   5            Placed during labor Accounted for at the end of labor   FSE No NA   IUPC No NA   Cervidil No NA                             Apgars    Living status: Living   1 Minute 5 Minute 10 Minute 15 Minute 20 Minute   Skin color: 0        Heart rate: 2        Reflex irritability: 2        Muscle tone: 2        Respiratory effort: 2        Total: 8               Cord      Vessels: 3 Vessels    Cord Complications: None               Cord Blood Disposition: Lab    Gases Sent?: No    Delayed cord clamping?: Yes    Cord Clamping Delay (seconds):  seconds    Stem cell collection?: No            Resuscitation    Methods: None       Labor Events and Shoulder Dystocia    Fetal Tracing Prior to Delivery: Category 2  Fetal Tracing Comments: deceleration to 90's for about 2 mins with contraction/pushes prior to delivery, deceleration with previous contraction with recovery to 110's between  Shoulder dystocia present?: Neg       Delivery (Maternal) (Provider to Complete) ([id number])    Episiotomy: None  Perineal lacerations: None    Repair suture: None  Number of repair  packets: 0  Genital tract inspection done: Pos       Blood Loss  Mother: Ladonna Casiano #[id number]     Start of Mother's Information      Delivery Blood Loss  01/12/24 1012 - 01/12/24 1134      Delivery QBL (mL) Hospital Encounter 25 mL    Total  25 mL               End of Mother's Information  Mother: Ladonna Casiano #[id number]                Delivery - Provider to Complete ([id number])    Delivering clinician: Felicia Umana MD  Delivery Type (Choose the 1 that will go to the Birth History): Vaginal, Spontaneous                         Other personnel:  Provider Role   Emelina Flores, Irina Elias RN                     Placenta    Date/Time: 1/12/2024 11:07 AM  Removal: Spontaneous  Disposition: Hospital disposal             Presentation and Position    Presentation: Vertex     Occiput Anterior                     Felicia Umana MD

## 2024-01-12 NOTE — H&P
[facility]    History and Physical  Obstetrics and Gynecology     Date of Admission:  2024    Assessment & Plan   Ladonna Casiano is a 28 year old female who presents with active labor.  ASSESSMENT:   IUP @ 39w2d in active labor.  NST reactive.  Category  I    PLAN:   Admit - see IP orders  Pain medication not desired  Anticipate     Felicia Umana MD    History of Present Illness   Ladonna Casiano is a 28 year old female  39w2d  Estimated Date of Delivery: 2024 is calculated from Patient's last menstrual period was 2023 (within weeks). [facility]  in active labor.  Membranes intact.    PRENATAL COURSE  Prenatal course was complicated by history of  section status-post 2 subsequent vaginal births.      Recent Labs   Lab Test 23  1234   AS Negative     Rhogam not indicated   Recent Labs   Lab Test 23  1234   HEPBANG Nonreactive   HIAGAB Nonreactive   RUQIGG Positive       Past Medical History    I have reviewed this patient's medical history and updated it with pertinent information if needed.   Past Medical History:   Diagnosis Date    Oligohydramnios, third trimester, fetus 1 3/28/2016    Previous  delivery, antepartum condition or complication 3/29/2017    Delivered 16    , delivered, current hospitalization        Past Surgical History   I have reviewed this patient's surgical history and updated it with pertinent information if needed.  Past Surgical History:   Procedure Laterality Date    C/SECTION, LOW TRANSVERSE N/A 2016       Prior to Admission Medications   Prior to Admission Medications   Prescriptions Last Dose Informant Patient Reported? Taking?   prenatal vitamin iron-folic acid 27mg-0.8mg (PRENATAL S) 27 mg iron- 800 mcg Tab tablet   Yes No   Sig: [PRENATAL VITAMIN IRON-FOLIC ACID 27MG-0.8MG (PRENATAL S) 27 MG IRON- 800 MCG TAB TABLET] Take 1 tablet by mouth daily.      Facility-Administered  Medications: None     Allergies   No Known Allergies    Social History   I have reviewed this patient's social history and updated it with pertinent information if needed. Ladonna Casiano  reports that she has never smoked. She has never used smokeless tobacco. She reports that she does not currently use alcohol after a past usage of about 3.0 - 4.0 standard drinks of alcohol per week. She reports that she does not use drugs.    Family History   I have reviewed this patient's family history and updated it with pertinent information if needed.   Family History   Problem Relation Age of Onset    No Known Problems Mother     No Known Problems Father     Diabetes Paternal Grandfather     Other Cancer Paternal Grandfather     Hypertension Maternal Grandmother     Depression Maternal Grandmother     Anxiety Disorder Maternal Grandmother     Other Cancer Paternal Grandmother     Breast Cancer No family hx of     Cancer No family hx of     Colon Cancer No family hx of     Heart Disease No family hx of        Immunization History   Immunization History   Administered Date(s) Administered    COVID-19 12+ (2023-24) (Pfizer) 11/09/2023    COVID-19 Bivalent 12+ (Pfizer) 06/19/2023    COVID-19 MONOVALENT 12+ (Pfizer) 09/27/2021, 10/18/2021    DTaP, Unspecified 02/07/1994, 04/14/1994, 06/14/1994, 11/12/1996, 03/29/1999    HPV Quadrivalent 08/18/2009, 11/17/2009    HPV9 10/28/2019    HepB, Unspecified 1993, 02/07/1994, 06/14/1994    Hib, Unspecified 02/07/1994, 04/14/1994, 06/14/1994, 11/12/1996    Influenza Vaccine >6 months,quad, PF 09/24/2015, 11/09/2016, 10/28/2019, 10/23/2020, 09/14/2021, 09/12/2023    MMR 11/12/1996, 03/29/1999    Meningococcal Mcv4 Conjugate,unspecified  08/18/2009    Poliovirus, inactivated (IPV) 02/07/1994, 04/14/1994, 06/14/1994, 03/29/1999    RSV Vaccine (Abrysvo) 11/28/2023    TDAP (Adacel,Boostrix) 08/30/2006, 02/03/2016, 07/25/2017, 10/05/2021, 11/09/2023    Varicella 08/18/2009, 11/17/2009        Physical Exam                      Vital Signs with Ranges  Temp:  [98.3  F (36.8  C)] 98.3  F (36.8  C)  Pulse:  [107] 107  Resp:  [20] 20  BP: (124)/(80) 124/80  SpO2:  [99 %] 99 %    Abdomen: gravid, single vertex fetus, non-tender, EFW <4500 g  Cervical Exam: 9/ 100/ 0     Fetal Heart Tones: cat I with accelerations and moderate variability  TOCO:   strong, regular contractions    Constitutional: healthy, alert, and active   Respiratory: occasional productive cough, breathing comfortably  Cardiovascular: no edema  Skin/Extremites: normal skin color, texture, turgor  Neurologic: alert and oriented x3  Neuropsychiatric: normal mood and affect

## 2024-01-12 NOTE — PROGRESS NOTES
1005 Patient arrived ambulatory from home with contractions with SO.  Brought to room 21, oriented to room, admission began.  1012 SVE per RN 6.5/90%/-1/cephalic.  RN called charge RN to update MD and charge RN to call Dr. Umana to come to hospital for delivery.

## 2024-01-12 NOTE — PROGRESS NOTES
Thankfully patient is feeling much better in terms of her chest wall pain.  She still has a productive cough, denies shortness of breath or fever.  She has been having occasional contractions, denies LOF or bleeding.  She reports good fetal movement, denies RUQ pain, headaches or blurred vision.  Membranes were swept today and she brings in leave paperwork to fill out.  Concerns: Leave paperwork  No HA, RUQ pain, N/V, visual changes.  Cervix is 3/70-80%/-2, soft, posterior.  Cephalic position confirmed by Leopold maneuvers and cervical exam.  Discussed signs of labor and when to call or come in.  Discussed kick counts and fetal movement.  Reportable signs and symptoms discussed.  Labor precautions discussed.  RTC 1 week.    Felicia Umana MD

## 2024-01-12 NOTE — PLAN OF CARE
1515 Report given to NALDO Portillo.  Cares relinquished at that time.  Bedside safety check and mutual fundal check performed.

## 2024-01-12 NOTE — PROGRESS NOTES
1042 Dr. Umana at bedside.  Remained at bedside until and throughout delivery, reviewing FHR tracing continuously.  1044 SVE 9cm at this time per MD  1053 AROM completed for small amount of clear fluid  1103 Delivery of female infant vaginally.  Infant brought to mother's abdomen, dried and stimulated.  Cord clamping delayed until stopped pulsating.  Infant then brought skin to skin with mother.  1107 Delivery of placenta.  QBL 25.

## 2024-01-13 VITALS
TEMPERATURE: 98.1 F | SYSTOLIC BLOOD PRESSURE: 105 MMHG | OXYGEN SATURATION: 97 % | DIASTOLIC BLOOD PRESSURE: 74 MMHG | BODY MASS INDEX: 28.22 KG/M2 | HEIGHT: 59 IN | WEIGHT: 140 LBS | RESPIRATION RATE: 18 BRPM | HEART RATE: 69 BPM

## 2024-01-13 PROCEDURE — 250N000013 HC RX MED GY IP 250 OP 250 PS 637: Performed by: FAMILY MEDICINE

## 2024-01-13 RX ORDER — IBUPROFEN 200 MG
600 TABLET ORAL EVERY 8 HOURS PRN
COMMUNITY
Start: 2024-01-13 | End: 2024-03-04

## 2024-01-13 RX ADMIN — IBUPROFEN 800 MG: 800 TABLET ORAL at 09:28

## 2024-01-13 RX ADMIN — WITCH HAZEL: 500 SOLUTION RECTAL; TOPICAL at 05:27

## 2024-01-13 RX ADMIN — IBUPROFEN 800 MG: 800 TABLET ORAL at 01:06

## 2024-01-13 RX ADMIN — DOCUSATE SODIUM 100 MG: 100 CAPSULE, LIQUID FILLED ORAL at 09:28

## 2024-01-13 ASSESSMENT — ACTIVITIES OF DAILY LIVING (ADL)
ADLS_ACUITY_SCORE: 20

## 2024-01-13 NOTE — PLAN OF CARE
Problem: Postpartum (Vaginal Delivery)  Goal: Hemostasis  Outcome: Progressing     Problem: Postpartum (Vaginal Delivery)  Goal: Optimal Pain Control and Function  Outcome: Progressing     Problem: Postpartum (Vaginal Delivery)  Goal: Effective Urinary Elimination  Outcome: Progressing    Goal Outcome Evaluation:    Patient's vital signs are WNL.   Bleeding is stable.   Denies of any headaches, vision changes, or pain in upper right abdomen.  Ambulating in room independently.   Voiding without any diffiulties.   Pain is tolerable with PRN ibuprofen.   Normal postpartum cares.

## 2024-01-13 NOTE — DISCHARGE INSTRUCTIONS
Warning Signs after Having a Baby    Keep this paper on your fridge or somewhere else where you can see it.    Call your provider if you have any of these symptoms up to 12 weeks after having your baby.    Thoughts of hurting yourself or your baby  Pain in your chest or trouble breathing  Severe headache not helped by pain medicine  Eyesight concerns (blurry vision, seeing spots or flashes of light, other changes to eyesight)  Fainting, shaking or other signs of a seizure    Call 9-1-1 if you feel that it is an emergency.     The symptoms below can happen to anyone after giving birth. They can be very serious. Call your provider if you have any of these warning signs.    My provider s phone number: _______________________    Losing too much blood (hemorrhage)    Call your provider if you soak through a pad in less than an hour or pass blood clots bigger than a golf ball. These may be signs that you are bleeding too much.    Blood clots in the legs or lungs    After you give birth, your body naturally clots its blood to help prevent blood loss. Sometimes this increased clotting can happen in other areas of the body, like the legs or lungs. This can block your blood flow and be very dangerous.     Call your provider if you:  Have a red, swollen spot on the back of your leg that is warm or painful when you touch it.   Are coughing up blood.     Infection    Call your provider if you have any of these symptoms:  Fever of 100.4 F (38 C) or higher.  Pain or redness around your stitches if you had an incision.   Any yellow, white, or green fluid coming from places where you had stitches or surgery.    Mood Problems (postpartum depression)    Many people feel sad or have mood changes after having a baby. But for some people, these mood swings are worse.     Call your provider right away if you feel so anxious or nervous that you can't care for yourself or your baby.    Preeclampsia (high blood pressure)    Even if you  didn't have high blood pressure when you were pregnant, you are at risk for the high blood pressure disease called preeclampsia. This risk can last up to 12 weeks after giving birth.     Call your provider if you have:   Pain on your right side under your rib cage  Sudden swelling in the hands and face    Remember: You know your body. If something doesn't feel right, get medical help.     For informational purposes only. Not to replace the advice of your health care provider. Copyright 2020 [facility]. All rights reserved. Clinically reviewed by Mora Stephens, RNC-OB, MSN. [employer] [id number] - Rev 02/23.

## 2024-01-13 NOTE — DISCHARGE SUMMARY
[facility]    Discharge Summary  Obstetrics    Date of Admission:  2024  Date of Discharge:  2024  Discharging Provider: Felicia Umana MD    Discharge Diagnoses       History of Present Illness   Ladonna Casiano is a 28 year old female who presented with spontaneous labor.  She progressed normally and had a normal spontaneous vaginal delivery.  She sustained no perineal tears.    Hospital Course   The patient's hospital course was unremarkable.  She recovered as anticipated and experienced no post-delivery complications.  On discharge, her pain was well controlled.  Vaginal bleeding is similar to peak menstrual flow.  Voiding without difficulty.  Ambulating well and tolerating a normal diet.  No fevers.  Breastfeeding is going very well.  Infant is stable.  She was discharged on post-partum day #2.    Post-partum hemoglobin:   Hemoglobin   Date Value Ref Range Status   2024 14.7 11.7 - 15.7 g/dL Final       [place] Depression Scale  Not done at time of note      Felicia Umana MD    Discharge Disposition   Discharged to home   Condition at discharge: Good    Discharge Exam:  Gen: no distress  Ext: no edema  Abd: uterus firm below umbilicus  : no hematoma or excess swelling  Psych: normal mood and affect    Primary Care Physician   Felicia Umana    Consultations This Hospital Stay   LACTATION IP CONSULT    Discharge Orders      Activity    Activity as tolerated     Reason for your hospital stay    Maternity care     Follow Up    Follow up with provider in 6 weeks for post-delivery check     Postpartum Exercises for vaginal delivery    These exercises may be started soon after delivery. If you feel tired or uncomfortable, stop and try these exercises after resting. Check for any separation in your stomach wall before doing exercises that involve twisting or stress on your stomach muscles. To check this place your fingers in the center of your upper abdomen and lift your  head and shoulders up in a partial sit-up. If you feel a separation in your muscle wall, it is too soon to do sit ups.   1) Tighten and relax your pelvic floor muscles often.   2) Breathe deeply while laying on your back. As you breathe out, lift just your head up and pull the sides of your stomach toward the middle with your hands. Then breathe in as you put your head down. This will help to close the separation of your stomach.   3) Tilt your pelvis back and forth. Alternate this with full body stretches.   4) Move your feet back and forth, then in circular motions.   5) While lying on your back, slide your heels toward your hips and bend your knees one at a time, then together.   6) While lying flat on the floor, bend your knees and raise your legs one at a time.   7) When the stomach wall separation has closed, you can progress to straight curl-ups, diagonal curl-ups, and side leg lifts.     Diet    Resume previous diet     Discharge Medications   Current Discharge Medication List        START taking these medications    Details   ibuprofen (ADVIL/MOTRIN) 200 MG tablet Take 3 tablets (600 mg) by mouth every 8 hours as needed for other (cramping)    Associated Diagnoses:  (normal spontaneous vaginal delivery)           CONTINUE these medications which have NOT CHANGED    Details   prenatal vitamin iron-folic acid 27mg-0.8mg (PRENATAL S) 27 mg iron- 800 mcg Tab tablet [PRENATAL VITAMIN IRON-FOLIC ACID 27MG-0.8MG (PRENATAL S) 27 MG IRON- 800 MCG TAB TABLET] Take 1 tablet by mouth daily.           Allergies   No Known Allergies

## 2024-01-13 NOTE — PLAN OF CARE
Problem: Postpartum (Vaginal Delivery)  Goal: Hemostasis  1/13/2024 0553 by Anuradha Muñoz, RN  Outcome: Progressing     Problem: Postpartum (Vaginal Delivery)  Goal: Optimal Pain Control and Function  1/13/2024 0553 by Anuradha Muñoz, RN  Outcome: Progressing     Problem: Postpartum (Vaginal Delivery)  Goal: Effective Urinary Elimination  1/13/2024 0553 by Anuradha Muñoz, RN  Outcome: Progressing    Goal Outcome Evaluation:    Patient's vital signs are WNL.   Bleeding is stable.   Denies of any headaches, vision changes, or pain in upper right abdomen.  Ambulating in room independently.   Voiding without any diffiulties.   Pain is tolerable with PRN ibuprofen.   Patient would like a 24 hour discharge.   Normal postpartum cares.

## 2024-01-13 NOTE — PLAN OF CARE
"  Problem: Adult Inpatient Plan of Care  Goal: Plan of Care Review  Description: The Plan of Care Review/Shift note should be completed every shift.  The Outcome Evaluation is a brief statement about your assessment that the patient is improving, declining, or no change.  This information will be displayed automatically on your shift  note.  Outcome: Met  Goal: Patient-Specific Goal (Individualized)  Description: You can add care plan individualizations to a care plan. Examples of Individualization might be:  \"Parent requests to be called daily at 9am for status\", \"I have a hard time hearing out of my right ear\", or \"Do not touch me to wake me up as it startles  me\".  Outcome: Met  Goal: Absence of Hospital-Acquired Illness or Injury  Outcome: Met  Intervention: Prevent Skin Injury  Recent Flowsheet Documentation  Taken 1/13/2024 1312 by Jennyfer Norton RN  Body Position: position changed independently  Taken 1/13/2024 1045 by Jennyfer Norton RN  Body Position: position changed independently  Skin Protection: adhesive use limited  Intervention: Prevent and Manage VTE (Venous Thromboembolism) Risk  Recent Flowsheet Documentation  Taken 1/13/2024 1045 by Jennyfer Norton RN  VTE Prevention/Management: (ambulation in room/bathroom) other (see comments)  Intervention: Prevent Infection  Recent Flowsheet Documentation  Taken 1/13/2024 1045 by Jennyfer Norton RN  Infection Prevention:   single patient room provided   hand hygiene promoted  Goal: Optimal Comfort and Wellbeing  Outcome: Met  Intervention: Provide Person-Centered Care  Recent Flowsheet Documentation  Taken 1/13/2024 1045 by Jennyfer Norton RN  Trust Relationship/Rapport:   care explained   questions encouraged   questions answered   choices provided   emotional support provided   empathic listening provided   reassurance provided   thoughts/feelings acknowledged  Goal: Readiness for Transition of Care  Outcome: Met     Problem: Postpartum (Vaginal Delivery)  Goal: " Successful Parent Role Transition  Outcome: Met  Intervention: Support Parent Role Transition  Recent Flowsheet Documentation  Taken 1/13/2024 1045 by Jennyfer Norton RN  Parent-Child Attachment Promotion:   caring behavior modeled   cue recognition promoted   participation in care promoted   rooming-in promoted   skin-to-skin contact encouraged  Goal: Hemostasis  Outcome: Met  Goal: Absence of Infection Signs and Symptoms  Outcome: Met  Intervention: Prevent or Manage Infection  Recent Flowsheet Documentation  Taken 1/13/2024 1045 by Jennyfer Norton RN  Infection Management: aseptic technique maintained  Goal: Anesthesia/Sedation Recovery  Outcome: Met  Goal: Optimal Pain Control and Function  Outcome: Met  Intervention: Prevent or Manage Pain  Recent Flowsheet Documentation  Taken 1/13/2024 1045 by Jennyfer Norton RN  Perineal Care:   absorbent brief/pad changed   perineum cleansed   perineal spray bottle/warm water use encouraged   perineal hygiene encouraged  Goal: Effective Urinary Elimination  Outcome: Met   Goal Outcome Evaluation:  Met, adequate for care transition to home with baby.  Discharge criteria met.  Breastfeeding baby well.  Independent with self cares and pain well controlled with ibuprofen as ordered.  Voiding well, passing flatus and adequate oral and fluid intake.

## 2024-01-17 ENCOUNTER — MEDICAL CORRESPONDENCE (OUTPATIENT)
Dept: HEALTH INFORMATION MANAGEMENT | Facility: CLINIC | Age: 29
End: 2024-01-17
Payer: COMMERCIAL

## 2024-01-26 NOTE — PROGRESS NOTES
"    Assessment & Plan     Less than 8 weeks gestation of pregnancy  Unsure of LMP (last menstrual period) as reason for ultrasound scan  Early pregnancy recommendations discussed today, informational folder given.  Patient is currently taking a prenatal vitamin.  Discussed early ultrasound to confirm dates as patient never had her menses after having Nexplanon removed.  She is agreeable to this.  Since her symptoms of nausea started last week, she is likely approximately 6 weeks gestation.  We will schedule her dating ultrasound in about 2 weeks.  Follow-up for first OB visit depending on gestation shown by ultrasound.  - US OB < 14 Weeks With Transvaginal     BMI:   Estimated body mass index is 27.17 kg/m  as calculated from the following:    Height as of this encounter: 4' 11.1\" (1.501 m).    Weight as of this encounter: 135 lb (61.2 kg).     Return in about 3 weeks (around 5/18/2021) for First OB Visit.    Felicia Umana MD  [facility]    Trinidad Casiano is 25 y.o. and presents today for the following health issues   HPI     Patient presents today for pregnancy confirmation.  She has had a couple of positive home pregnancy tests.  To review, we removed her Nexplanon on 2/10/2021 as this was due to come out.  Patient wanted to consider whether she desired another pregnancy now or whether she would like to try a different contraceptive method.  In the interim, she never did have her menses.  She took some home pregnancy tests at the end of March she states and these were negative.  She began having symptoms of nausea and fatigue last week and took another home test and this returned positive.  She is happy about being pregnant.  She has 2 other children.  She did suffer from nausea with both of her previous pregnancies, worse with her most recent pregnancy.  Currently she is able to keep down food and fluids, but feels mildly nauseous.  She denies any leakage of fluid " PRE-OP DATA and Check List - GI: Procedure: Colonoscopy (21715) with NuLytely (Split Dose) and EGD (70222) and dilation Prefers Dr. Oconnor Wants early AM.   Diagnosis: Abdominal Pain R10.9, Change in Bowel Habits R19.4, Dysphagia R13.10, and Gastroesophageal Reflux Disease without Esophagitis K21.9   Tentative Date: Non urgent can get done in 6 to 8 weeks- Tentative Time: To be determined   Duration of Procedure: 2 hrs   Anesthesia: MAC   No need to see PMD: RN to call patient to go over medication list.       Hold Multivitamins/Supplements for 7 days prior to procedure.    Hold for 3 days prior to procedure:  Patient should hold NSAID such as ibuprofen, aleve, advil, meloxicam, naproxen, aspirin 81 MG EC tablet , etc for 3 days prior to procedure. It is okay to use tylenol during this time       "or bleeding.    Review of Systems  Negative except as noted above      Objective    /79 (Patient Site: Left Arm, Patient Position: Sitting, Cuff Size: Adult Regular)   Pulse 81   Ht 4' 11.1\" (1.501 m)   Wt 135 lb (61.2 kg)   LMP  (LMP Unknown)   Breastfeeding No   BMI 27.17 kg/m    Body mass index is 27.17 kg/m .  Physical Exam  General: Well-developed well-nourished female in no acute distress  Neurologic: Alert and oriented, good historian  Psychiatric: Normal mood and affect    Results for orders placed or performed in visit on 04/27/21   Pregnancy (Beta-hCG, Qual), Urine   Result Value Ref Range    Pregnancy Test, Urine Positive (!) Negative               "

## 2024-03-04 ENCOUNTER — PRENATAL OFFICE VISIT (OUTPATIENT)
Dept: FAMILY MEDICINE | Facility: CLINIC | Age: 29
End: 2024-03-04
Payer: COMMERCIAL

## 2024-03-04 ENCOUNTER — MEDICAL CORRESPONDENCE (OUTPATIENT)
Dept: FAMILY MEDICINE | Facility: CLINIC | Age: 29
End: 2024-03-04

## 2024-03-04 VITALS
TEMPERATURE: 97.7 F | WEIGHT: 125.8 LBS | DIASTOLIC BLOOD PRESSURE: 70 MMHG | BODY MASS INDEX: 25.36 KG/M2 | SYSTOLIC BLOOD PRESSURE: 104 MMHG | RESPIRATION RATE: 14 BRPM | HEIGHT: 59 IN | HEART RATE: 67 BPM | OXYGEN SATURATION: 97 %

## 2024-03-04 DIAGNOSIS — L98.9 SKIN LESION: ICD-10-CM

## 2024-03-04 PROBLEM — O35.03X0 CHOROID PLEXUS CYST OF FETUS IN SINGLETON PREGNANCY: Status: RESOLVED | Noted: 2023-09-12 | Resolved: 2024-03-04

## 2024-03-04 PROBLEM — Z34.90 PREGNANCY: Status: RESOLVED | Noted: 2024-01-12 | Resolved: 2024-03-04

## 2024-03-04 PROBLEM — O34.219 PREVIOUS CESAREAN DELIVERY, ANTEPARTUM CONDITION OR COMPLICATION: Status: RESOLVED | Noted: 2021-08-21 | Resolved: 2024-03-04

## 2024-03-04 PROBLEM — O09.93 SUPERVISION OF HIGH RISK PREGNANCY IN THIRD TRIMESTER: Status: RESOLVED | Noted: 2023-07-21 | Resolved: 2024-03-04

## 2024-03-04 PROCEDURE — 99207 PR POST PARTUM EXAM: CPT | Performed by: FAMILY MEDICINE

## 2024-03-04 RX ORDER — ACETAMINOPHEN AND CODEINE PHOSPHATE 120; 12 MG/5ML; MG/5ML
0.35 SOLUTION ORAL DAILY
Qty: 30 TABLET | Refills: 2 | Status: SHIPPED | OUTPATIENT
Start: 2024-03-04 | End: 2024-03-28

## 2024-03-04 ASSESSMENT — PAIN SCALES - GENERAL: PAINLEVEL: NO PAIN (0)

## 2024-03-04 NOTE — PROGRESS NOTES
"  Assessment & Plan     Routine postpartum follow-up  Patient is doing very well postpartum.  Breast-feeding is going well, she required no perineal repair.  She has no significant rectus separation.  Her mood has been good.  Breast-feeding is going well.  She would like to try progesterone only contraception until she can have a Nexplanon placed.  Will assist her with scheduling this today.  She is aware to use backup birth control for at least 2 weeks when initiating a new method.  - norethindrone (MICRONOR) 0.35 MG tablet; Take 1 tablet (0.35 mg) by mouth daily    Skin lesion  Likely this is a dermatofibroma.  It is present on her right anterior thigh.  She would like this removed at the same time she has her Nexplanon placed.            BMI  Estimated body mass index is 25.5 kg/m  as calculated from the following:    Height as of this encounter: 1.496 m (4' 10.9\").    Weight as of this encounter: 57.1 kg (125 lb 12.8 oz).             Trinidad Dougherty is a 28 year old, presenting for the following health issues:  Post Partum Exam      3/4/2024     8:49 AM   Additional Questions   Roomed by Sandra WALKER     Patient presents today for routine postpartum visit.  She delivered a healthy infant via spontaneous vaginal delivery on 1/12/2024.  She did not sustain a perineal laceration during delivery.  Breast-feeding has been going well.  She is interested in Nexplanon for contraception.  Ultimately her  plans to have a vasectomy.  She continues to have a raised skin lesion on her right anterior thigh that she would like removed.  She reports her mood has been good.  Breast-feeding is going well.  She has no additional questions or concerns today.      Review of Systems  Constitutional, HEENT, cardiovascular, pulmonary, gi and gu systems are negative, except as otherwise noted.      Objective    /70 (BP Location: Right arm, Patient Position: Sitting, Cuff Size: Adult Regular)   Pulse 67   Temp 97.7  F " "(36.5  C) (Temporal)   Resp 14   Ht 1.496 m (4' 10.9\")   Wt 57.1 kg (125 lb 12.8 oz)   LMP 03/29/2023 (Within Weeks)   SpO2 97%   Breastfeeding Yes   BMI 25.50 kg/m    Body mass index is 25.5 kg/m .  Physical Exam   GENERAL: alert and no distress  RESP: Breathing comfortably, no cough during the visit  ABDOMEN: No significant rectus separation  MS: no gross musculoskeletal defects noted, no edema  NEURO: Normal strength and tone, mentation intact and speech normal  PSYCH: mentation appears normal, affect normal/bright  SKIN: Subcentimeter firm flesh-colored lesion that dimples when pinched, right anterior thigh    Diagnostics: EPDS = 1        Signed Electronically by: Felicia Umana MD     "

## 2024-03-13 ENCOUNTER — MEDICAL CORRESPONDENCE (OUTPATIENT)
Dept: HEALTH INFORMATION MANAGEMENT | Facility: CLINIC | Age: 29
End: 2024-03-13
Payer: COMMERCIAL

## 2024-03-19 ENCOUNTER — MEDICAL CORRESPONDENCE (OUTPATIENT)
Dept: HEALTH INFORMATION MANAGEMENT | Facility: CLINIC | Age: 29
End: 2024-03-19
Payer: COMMERCIAL

## 2024-03-28 ENCOUNTER — OFFICE VISIT (OUTPATIENT)
Dept: FAMILY MEDICINE | Facility: CLINIC | Age: 29
End: 2024-03-28
Payer: COMMERCIAL

## 2024-03-28 VITALS
RESPIRATION RATE: 16 BRPM | HEIGHT: 59 IN | OXYGEN SATURATION: 99 % | SYSTOLIC BLOOD PRESSURE: 110 MMHG | WEIGHT: 126.6 LBS | TEMPERATURE: 98.4 F | DIASTOLIC BLOOD PRESSURE: 74 MMHG | BODY MASS INDEX: 25.52 KG/M2 | HEART RATE: 68 BPM

## 2024-03-28 DIAGNOSIS — Z30.017 INSERTION OF IMPLANTABLE SUBDERMAL CONTRACEPTIVE: ICD-10-CM

## 2024-03-28 DIAGNOSIS — Z30.017 NEXPLANON INSERTION: Primary | ICD-10-CM

## 2024-03-28 DIAGNOSIS — L98.9 SKIN LESION: ICD-10-CM

## 2024-03-28 LAB — HCG UR QL: NEGATIVE

## 2024-03-28 PROCEDURE — 81025 URINE PREGNANCY TEST: CPT | Performed by: FAMILY MEDICINE

## 2024-03-28 PROCEDURE — 11981 INSERTION DRUG DLVR IMPLANT: CPT | Performed by: FAMILY MEDICINE

## 2024-03-28 PROCEDURE — 11104 PUNCH BX SKIN SINGLE LESION: CPT | Mod: RT | Performed by: FAMILY MEDICINE

## 2024-03-28 PROCEDURE — 88305 TISSUE EXAM BY PATHOLOGIST: CPT | Performed by: PATHOLOGY

## 2024-03-28 NOTE — PROGRESS NOTES
"Nexplanon Insertion:    Is a pregnancy test required: Yes.  Was it positive or negative?  Negative  Was a consent obtained?  Yes    Subjective: Ladonna Casiano is a 28 year old  presents for Nexplanon.    Patient has been given the opportunity to ask questions about all forms of birth control, including all options appropriate for Ladonna Casiano. Discussed that no method of birth control, except abstinence is 100% effective against pregnancy or sexually transmitted infection.     Ladonna Casiano understands she may have the Nexplanon removed at any time and it should be removed by a health care provider.    The entire insertion procedure was reviewed with the patient, including care after placement.    No LMP recorded. Has current contraception. No allergy to betadine or shellfish. Patient declines STD screening  hCG Urine Qualitative   Date Value Ref Range Status   2024 Negative Negative Final     Comment:     This test is for screening purposes.  Results should be interpreted along with the clinical picture.  Confirmation testing is available if warranted by ordering NAK235, HCG Quantitative Pregnancy.         /74   Pulse 68   Temp 98.4  F (36.9  C) (Oral)   Resp 16   Ht 1.496 m (4' 10.9\")   Wt 57.4 kg (126 lb 9.6 oz)   SpO2 99%   Breastfeeding Yes   BMI 25.66 kg/m      PROCEDURE NOTE: -- Nexplanon Insertion    Reason for Insertion: contraception    Patient was placed supine with left arm exposed.  Christy was made 8-10 cm above medial epicondyle and a guiding christy 4 cm above the first.  Arm was prepped with Betadine. Insertion point was anesthetized with 2 mL 1% lidocaine. After stretching the skin with thumb and index finger around the insertion site, skin punctured with the tip of the needle inserted at 30 degrees and then lowered to horizontal position. The needle was then advanced to its full length. Applicator was then stabilized and slider was unlocked. Slider was pulled back until it " stopped and then removed.    Correct placement of the implant was confirmed by palpation in the patient's arm and visualizing the purple top of the obturator.   Bandage and pressure dressing applied to insertion site.    Lot # [id number]  Exp: [id number]    EBL: minimal    Complications: none    ASSESSMENT:     ICD-10-CM    1. Nexplanon insertion  Z30.017 HCG qualitative urine     HCG qualitative urine     etonogestrel (NEXPLANON) subdermal implant 68 mg     INSERTION NON-BIODEGRADABLE DRUG DELIVERY IMPLANT      2. Skin lesion  L98.9 Surgical Pathology Exam      3. Insertion of implantable subdermal contraceptive  Z30.017 etonogestrel (NEXPLANON) subdermal implant 68 mg     INSERTION NON-BIODEGRADABLE DRUG DELIVERY IMPLANT             PLAN:    Given 's handouts, including when to have Nexplanon removed, list of danger s/sx, side effects and follow up recommended. Encouraged condom use for prevention of STD. Back up contraception advised for 7 days. Advised to call for any fever, for prolonged or severe pain or bleeding, abnormal vaginal dischage. She was advised to use pain medications (ibuprofen) as needed for mild to moderate pain.     Felicia Umana MD

## 2024-03-28 NOTE — PROGRESS NOTES
"  Assessment & Plan     Nexplanon insertion  See procedure note from same date.  - HCG qualitative urine; Future  - HCG qualitative urine  - etonogestrel (NEXPLANON) subdermal implant 68 mg  - INSERTION NON-BIODEGRADABLE DRUG DELIVERY IMPLANT    Insertion of implantable subdermal contraceptive  See procedure note from same date.  - etonogestrel (NEXPLANON) subdermal implant 68 mg  - INSERTION NON-BIODEGRADABLE DRUG DELIVERY IMPLANT    Skin lesion  See procedure note at end of this note.  - Surgical Pathology Exam            BMI  Estimated body mass index is 25.66 kg/m  as calculated from the following:    Height as of this encounter: 1.496 m (4' 10.9\").    Weight as of this encounter: 57.4 kg (126 lb 9.6 oz).             Trinidad Dougherty is a 28 year old, presenting for the following health issues:  Contraception (Nexplanon insertion) and Lesion Removal        3/28/2024     1:02 PM   Additional Questions   Roomed by Aparna RODAS LPN     Contraception       Patient presents today to have Nexplanon contraceptive implant placed.  She would also like to have a skin lesion removed from her right thigh.  The skin lesion seems to be growing slowly, and she catches this while shaving her legs.  She has no personal history of skin cancer.      Review of Systems  Constitutional, HEENT, cardiovascular, pulmonary, gi and gu systems are negative, except as otherwise noted.      Objective    /74   Pulse 68   Temp 98.4  F (36.9  C) (Oral)   Resp 16   Ht 1.496 m (4' 10.9\")   Wt 57.4 kg (126 lb 9.6 oz)   SpO2 99%   Breastfeeding Yes   BMI 25.66 kg/m    Body mass index is 25.66 kg/m .  Physical Exam   GENERAL: alert and no distress  RESP: Breathing comfortably, no cough during the visit  MS: no gross musculoskeletal defects noted, no edema  SKIN: Approximately 5 cm lightly pigmented nodule right anterior thigh, dimples when pinched; pigmented verrucoid papule posterior neck  PSYCH: mentation appears normal, affect " normal/bright    Diagnostics: Surgical pathology pending        Signed Electronically by: Felicia Umana MD      Procedure Note:  After discussion of risks and benefits, patient wishes to have the nodule on her right anterior thigh removed today.  Area was cleansed with rubbing alcohol and anesthetized using 1.5 mL of 1% lidocaine with epinephrine.  Using a 6 mm punch, lesion was removed whole.  3 interrupted sutures were placed with 4-0 Ethilon.  Patient tolerated the procedure well and bleeding was minimal.  Lesion was sent for pathology, suspect dermatofibroma.  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

## 2024-04-01 LAB
PATH REPORT.COMMENTS IMP SPEC: NORMAL
PATH REPORT.COMMENTS IMP SPEC: NORMAL
PATH REPORT.FINAL DX SPEC: NORMAL
PATH REPORT.GROSS SPEC: NORMAL
PATH REPORT.MICROSCOPIC SPEC OTHER STN: NORMAL
PATH REPORT.RELEVANT HX SPEC: NORMAL
PHOTO IMAGE: NORMAL

## 2024-05-30 ENCOUNTER — MEDICAL CORRESPONDENCE (OUTPATIENT)
Dept: HEALTH INFORMATION MANAGEMENT | Facility: CLINIC | Age: 29
End: 2024-05-30
Payer: COMMERCIAL

## 2024-07-18 ENCOUNTER — MEDICAL CORRESPONDENCE (OUTPATIENT)
Dept: HEALTH INFORMATION MANAGEMENT | Facility: CLINIC | Age: 29
End: 2024-07-18
Payer: COMMERCIAL

## 2024-07-21 ENCOUNTER — HEALTH MAINTENANCE LETTER (OUTPATIENT)
Age: 29
End: 2024-07-21

## 2025-06-10 ENCOUNTER — OFFICE VISIT (OUTPATIENT)
Dept: FAMILY MEDICINE | Facility: CLINIC | Age: 30
End: 2025-06-10
Payer: COMMERCIAL

## 2025-06-10 VITALS
HEART RATE: 87 BPM | OXYGEN SATURATION: 98 % | RESPIRATION RATE: 16 BRPM | DIASTOLIC BLOOD PRESSURE: 84 MMHG | SYSTOLIC BLOOD PRESSURE: 128 MMHG | WEIGHT: 125.6 LBS | BODY MASS INDEX: 25.32 KG/M2 | TEMPERATURE: 97 F | HEIGHT: 59 IN

## 2025-06-10 DIAGNOSIS — H81.10 BENIGN PAROXYSMAL POSITIONAL VERTIGO, UNSPECIFIED LATERALITY: Primary | ICD-10-CM

## 2025-06-10 PROCEDURE — 3079F DIAST BP 80-89 MM HG: CPT | Performed by: NURSE PRACTITIONER

## 2025-06-10 PROCEDURE — 3074F SYST BP LT 130 MM HG: CPT | Performed by: NURSE PRACTITIONER

## 2025-06-10 PROCEDURE — 99213 OFFICE O/P EST LOW 20 MIN: CPT | Performed by: NURSE PRACTITIONER

## 2025-06-10 RX ORDER — MECLIZINE HYDROCHLORIDE 25 MG/1
25 TABLET ORAL 3 TIMES DAILY PRN
Qty: 40 TABLET | Refills: 0 | Status: SHIPPED | OUTPATIENT
Start: 2025-06-10

## 2025-06-10 NOTE — PROGRESS NOTES
"  Assessment & Plan     Benign paroxysmal positional vertigo, unspecified laterality  She reports she woke up Sunday morning with dizziness this is progressed throughout the day.  Seem to be worse with laying down or turning her head.  She has not had this before.  Denies recent cold or ear symptoms.  No recent flying.  No head injury or headaches.  No weakness, fevers.  No recent vision change she does wear contacts and had an eye exam.  Discussed treatment with exercises and medication as needed.  She is breast-feeding, medication may reduce milk supply.  She is okay with this as she has been thinking of weaning her daughter at this time.  Tips given on after visit summary.  Discussed symptoms to watch out for and when to follow-up  - meclizine (ANTIVERT) 25 MG tablet; Take 1 tablet (25 mg) by mouth 3 times daily as needed for dizziness.          BMI  Estimated body mass index is 25.32 kg/m  as calculated from the following:    Height as of this encounter: 1.5 m (4' 11.06\").    Weight as of this encounter: 57 kg (125 lb 9.6 oz).         Follow-up  Return if symptoms worsen or fail to improve.    Trinidad Dougherty is a 29 year old, presenting for the following health issues:  Dizziness        6/10/2025     4:48 PM   Additional Questions   Roomed by Mikaela Contreras CMA         6/10/2025     4:48 PM   Patient Reported Additional Medications   Patient reports taking the following new medications ibuprofen     History of Present Illness       Reason for visit:  New symptoms of vertigo  Symptom onset:  1-3 days ago  Symptoms include:  Dizziness, particularly with head movements  Symptom intensity:  Mild  Symptom progression:  Staying the same  Had these symptoms before:  No  What makes it worse:  Sudden head turns, getting up from laying down   She is taking medications regularly.          Review of Systems  Constitutional, HEENT, cardiovascular, pulmonary, GI, , musculoskeletal, neuro, skin, endocrine and psych " "systems are negative, except as otherwise noted.      Objective    /84 (BP Location: Left arm, Patient Position: Sitting, Cuff Size: Adult Regular)   Pulse 87   Temp 97  F (36.1  C) (Temporal)   Resp 16   Ht 1.5 m (4' 11.06\")   Wt 57 kg (125 lb 9.6 oz)   SpO2 98%   BMI 25.32 kg/m    Body mass index is 25.32 kg/m .  Physical Exam   GENERAL: alert and no distress  EYES: Eyes grossly normal to inspection, PERRL and conjunctivae and sclerae normal POSITIVE nystagmus present bilaterally  HENT: ear canals and TM's normal   NECK: no adenopathy, no asymmetry, masses, or scars  RESP: lungs clear to auscultation - no rales, rhonchi or wheezes  CV: regular rate and rhythm, normal S1 S2, no S3 or S4, no murmur, click or rub, no peripheral edema  SKIN: no suspicious  rashes  NEURO: Normal strength and tone, cranial nerves intact, mentation intact and speech normal  PSYCH: mentation appears normal, affect normal/bright    See orders        Signed Electronically by: BISMARK PENALOZA    "

## 2025-08-19 ENCOUNTER — OFFICE VISIT (OUTPATIENT)
Dept: FAMILY MEDICINE | Facility: CLINIC | Age: 30
End: 2025-08-19
Payer: COMMERCIAL

## 2025-08-19 VITALS
DIASTOLIC BLOOD PRESSURE: 87 MMHG | BODY MASS INDEX: 25.12 KG/M2 | SYSTOLIC BLOOD PRESSURE: 112 MMHG | HEIGHT: 59 IN | RESPIRATION RATE: 16 BRPM | TEMPERATURE: 98.8 F | HEART RATE: 69 BPM | OXYGEN SATURATION: 100 % | WEIGHT: 124.6 LBS

## 2025-08-19 DIAGNOSIS — Z13.220 LIPID SCREENING: ICD-10-CM

## 2025-08-19 DIAGNOSIS — Z00.00 ROUTINE GENERAL MEDICAL EXAMINATION AT A HEALTH CARE FACILITY: Primary | ICD-10-CM

## 2025-08-19 DIAGNOSIS — Z13.1 SCREENING FOR DIABETES MELLITUS: ICD-10-CM

## 2025-08-19 PROBLEM — L98.9 SKIN LESION: Status: RESOLVED | Noted: 2023-05-24 | Resolved: 2025-08-19

## 2025-08-19 LAB
EST. AVERAGE GLUCOSE BLD GHB EST-MCNC: 111 MG/DL
HBA1C MFR BLD: 5.5 % (ref 0–5.6)

## 2025-08-19 PROCEDURE — 83036 HEMOGLOBIN GLYCOSYLATED A1C: CPT | Performed by: FAMILY MEDICINE

## 2025-08-19 PROCEDURE — 36415 COLL VENOUS BLD VENIPUNCTURE: CPT | Performed by: FAMILY MEDICINE

## 2025-08-19 PROCEDURE — 3044F HG A1C LEVEL LT 7.0%: CPT | Performed by: FAMILY MEDICINE

## 2025-08-19 PROCEDURE — 3049F LDL-C 100-129 MG/DL: CPT | Performed by: FAMILY MEDICINE

## 2025-08-19 PROCEDURE — 3079F DIAST BP 80-89 MM HG: CPT | Performed by: FAMILY MEDICINE

## 2025-08-19 PROCEDURE — 80061 LIPID PANEL: CPT | Performed by: FAMILY MEDICINE

## 2025-08-19 PROCEDURE — 3074F SYST BP LT 130 MM HG: CPT | Performed by: FAMILY MEDICINE

## 2025-08-19 PROCEDURE — 99395 PREV VISIT EST AGE 18-39: CPT | Performed by: FAMILY MEDICINE

## 2025-08-19 SDOH — HEALTH STABILITY: PHYSICAL HEALTH: ON AVERAGE, HOW MANY MINUTES DO YOU ENGAGE IN EXERCISE AT THIS LEVEL?: 30 MIN

## 2025-08-19 SDOH — HEALTH STABILITY: PHYSICAL HEALTH: ON AVERAGE, HOW MANY DAYS PER WEEK DO YOU ENGAGE IN MODERATE TO STRENUOUS EXERCISE (LIKE A BRISK WALK)?: 3 DAYS

## 2025-08-19 ASSESSMENT — SOCIAL DETERMINANTS OF HEALTH (SDOH): HOW OFTEN DO YOU GET TOGETHER WITH FRIENDS OR RELATIVES?: ONCE A WEEK

## 2025-08-20 LAB
CHOLEST SERPL-MCNC: 190 MG/DL
FASTING STATUS PATIENT QL REPORTED: NO
HDLC SERPL-MCNC: 61 MG/DL
LDLC SERPL CALC-MCNC: 117 MG/DL
NONHDLC SERPL-MCNC: 129 MG/DL
TRIGL SERPL-MCNC: 61 MG/DL